# Patient Record
Sex: FEMALE | Race: WHITE | NOT HISPANIC OR LATINO | Employment: OTHER | URBAN - METROPOLITAN AREA
[De-identification: names, ages, dates, MRNs, and addresses within clinical notes are randomized per-mention and may not be internally consistent; named-entity substitution may affect disease eponyms.]

---

## 2020-07-09 DIAGNOSIS — M54.50 LOW BACK PAIN WITHOUT SCIATICA, UNSPECIFIED BACK PAIN LATERALITY, UNSPECIFIED CHRONICITY: Primary | ICD-10-CM

## 2020-07-10 RX ORDER — TRAMADOL HYDROCHLORIDE 50 MG/1
TABLET ORAL
Qty: 120 TABLET | Refills: 1 | Status: SHIPPED | OUTPATIENT
Start: 2020-07-10 | End: 2020-10-23

## 2020-07-18 DIAGNOSIS — I10 ESSENTIAL HYPERTENSION: Primary | ICD-10-CM

## 2020-07-18 RX ORDER — QUINAPRIL 40 MG/1
TABLET ORAL
Qty: 90 TABLET | Refills: 1 | Status: SHIPPED | OUTPATIENT
Start: 2020-07-18 | End: 2021-04-05 | Stop reason: SDUPTHER

## 2020-09-13 DIAGNOSIS — E78.2 MIXED HYPERLIPIDEMIA: Primary | ICD-10-CM

## 2020-09-15 RX ORDER — ATORVASTATIN CALCIUM 20 MG/1
TABLET, FILM COATED ORAL
Qty: 90 TABLET | Refills: 0 | Status: SHIPPED | OUTPATIENT
Start: 2020-09-15 | End: 2020-12-14 | Stop reason: SDUPTHER

## 2020-10-21 DIAGNOSIS — M54.50 LOW BACK PAIN WITHOUT SCIATICA, UNSPECIFIED BACK PAIN LATERALITY, UNSPECIFIED CHRONICITY: ICD-10-CM

## 2020-10-23 RX ORDER — TRAMADOL HYDROCHLORIDE 50 MG/1
TABLET ORAL
Qty: 120 TABLET | Refills: 0 | Status: SHIPPED | OUTPATIENT
Start: 2020-10-23 | End: 2021-01-27

## 2020-11-21 DIAGNOSIS — G25.81 RLS (RESTLESS LEGS SYNDROME): Primary | ICD-10-CM

## 2020-11-21 RX ORDER — PRAMIPEXOLE DIHYDROCHLORIDE 1 MG/1
TABLET ORAL
Qty: 270 TABLET | Refills: 1 | Status: SHIPPED | OUTPATIENT
Start: 2020-11-21 | End: 2021-04-16 | Stop reason: SDUPTHER

## 2020-12-14 DIAGNOSIS — E78.2 MIXED HYPERLIPIDEMIA: ICD-10-CM

## 2020-12-14 RX ORDER — ATORVASTATIN CALCIUM 20 MG/1
20 TABLET, FILM COATED ORAL DAILY
Qty: 90 TABLET | Refills: 0 | Status: SHIPPED | OUTPATIENT
Start: 2020-12-14 | End: 2021-04-05 | Stop reason: SDUPTHER

## 2021-01-26 DIAGNOSIS — M54.50 LOW BACK PAIN WITHOUT SCIATICA, UNSPECIFIED BACK PAIN LATERALITY, UNSPECIFIED CHRONICITY: ICD-10-CM

## 2021-01-27 RX ORDER — TRAMADOL HYDROCHLORIDE 50 MG/1
TABLET ORAL
Qty: 120 TABLET | Refills: 0 | Status: SHIPPED | OUTPATIENT
Start: 2021-01-27 | End: 2021-04-05 | Stop reason: SDUPTHER

## 2021-02-11 DIAGNOSIS — Z23 ENCOUNTER FOR IMMUNIZATION: ICD-10-CM

## 2021-02-12 ENCOUNTER — IMMUNIZATIONS (OUTPATIENT)
Dept: FAMILY MEDICINE CLINIC | Facility: HOSPITAL | Age: 76
End: 2021-02-12

## 2021-02-12 DIAGNOSIS — Z23 ENCOUNTER FOR IMMUNIZATION: Primary | ICD-10-CM

## 2021-02-25 DIAGNOSIS — E78.2 MIXED HYPERLIPIDEMIA: ICD-10-CM

## 2021-02-25 RX ORDER — ATORVASTATIN CALCIUM 20 MG/1
TABLET, FILM COATED ORAL
Qty: 90 TABLET | Refills: 0 | OUTPATIENT
Start: 2021-02-25

## 2021-03-11 ENCOUNTER — IMMUNIZATIONS (OUTPATIENT)
Dept: FAMILY MEDICINE CLINIC | Facility: HOSPITAL | Age: 76
End: 2021-03-11

## 2021-03-11 DIAGNOSIS — Z23 ENCOUNTER FOR IMMUNIZATION: Primary | ICD-10-CM

## 2021-03-11 PROCEDURE — 91301 SARS-COV-2 / COVID-19 MRNA VACCINE (MODERNA) 100 MCG: CPT

## 2021-03-11 PROCEDURE — 0012A SARS-COV-2 / COVID-19 MRNA VACCINE (MODERNA) 100 MCG: CPT

## 2021-03-17 ENCOUNTER — TELEPHONE (OUTPATIENT)
Dept: FAMILY MEDICINE CLINIC | Facility: CLINIC | Age: 76
End: 2021-03-17

## 2021-03-17 DIAGNOSIS — R53.83 OTHER FATIGUE: ICD-10-CM

## 2021-03-17 DIAGNOSIS — Z13.220 SCREENING CHOLESTEROL LEVEL: ICD-10-CM

## 2021-03-17 DIAGNOSIS — Z11.59 ENCOUNTER FOR HEPATITIS C SCREENING TEST FOR LOW RISK PATIENT: Primary | ICD-10-CM

## 2021-03-17 NOTE — TELEPHONE ENCOUNTER
Pt  called and would like to know if you can order lab work for her before her next visit?    I will mail to her per her request

## 2021-04-05 DIAGNOSIS — I10 ESSENTIAL HYPERTENSION: ICD-10-CM

## 2021-04-05 DIAGNOSIS — E78.2 MIXED HYPERLIPIDEMIA: ICD-10-CM

## 2021-04-05 DIAGNOSIS — M54.50 LOW BACK PAIN WITHOUT SCIATICA, UNSPECIFIED BACK PAIN LATERALITY, UNSPECIFIED CHRONICITY: ICD-10-CM

## 2021-04-06 DIAGNOSIS — I10 ESSENTIAL HYPERTENSION: ICD-10-CM

## 2021-04-06 DIAGNOSIS — E78.2 MIXED HYPERLIPIDEMIA: ICD-10-CM

## 2021-04-06 RX ORDER — ATORVASTATIN CALCIUM 20 MG/1
20 TABLET, FILM COATED ORAL DAILY
Qty: 30 TABLET | Refills: 0 | Status: SHIPPED | OUTPATIENT
Start: 2021-04-06 | End: 2021-04-06

## 2021-04-06 RX ORDER — QUINAPRIL 40 MG/1
40 TABLET ORAL DAILY
Qty: 30 TABLET | Refills: 0 | Status: SHIPPED | OUTPATIENT
Start: 2021-04-06 | End: 2021-04-06

## 2021-04-06 RX ORDER — TRAMADOL HYDROCHLORIDE 50 MG/1
50 TABLET ORAL EVERY 6 HOURS
Qty: 120 TABLET | Refills: 0 | Status: SHIPPED | OUTPATIENT
Start: 2021-04-06 | End: 2021-04-16 | Stop reason: SDUPTHER

## 2021-04-06 RX ORDER — ATORVASTATIN CALCIUM 20 MG/1
TABLET, FILM COATED ORAL
Qty: 90 TABLET | Refills: 0 | Status: SHIPPED | OUTPATIENT
Start: 2021-04-06 | End: 2021-04-16 | Stop reason: SDUPTHER

## 2021-04-06 RX ORDER — QUINAPRIL 40 MG/1
TABLET ORAL
Qty: 90 TABLET | Refills: 0 | Status: SHIPPED | OUTPATIENT
Start: 2021-04-06 | End: 2021-04-16 | Stop reason: SDUPTHER

## 2021-04-15 PROBLEM — G25.81 RESTLESS LEGS SYNDROME (RLS): Status: ACTIVE | Noted: 2021-04-15

## 2021-04-16 ENCOUNTER — OFFICE VISIT (OUTPATIENT)
Dept: FAMILY MEDICINE CLINIC | Facility: CLINIC | Age: 76
End: 2021-04-16
Payer: MEDICARE

## 2021-04-16 VITALS
BODY MASS INDEX: 26.66 KG/M2 | HEART RATE: 85 BPM | RESPIRATION RATE: 22 BRPM | TEMPERATURE: 98.4 F | SYSTOLIC BLOOD PRESSURE: 122 MMHG | DIASTOLIC BLOOD PRESSURE: 62 MMHG | HEIGHT: 65 IN | OXYGEN SATURATION: 95 % | WEIGHT: 160 LBS

## 2021-04-16 DIAGNOSIS — F32.1 CURRENT MODERATE EPISODE OF MAJOR DEPRESSIVE DISORDER WITHOUT PRIOR EPISODE (HCC): ICD-10-CM

## 2021-04-16 DIAGNOSIS — Z23 ENCOUNTER FOR IMMUNIZATION: ICD-10-CM

## 2021-04-16 DIAGNOSIS — I10 ESSENTIAL HYPERTENSION: ICD-10-CM

## 2021-04-16 DIAGNOSIS — R93.89 ABNORMAL CXR: ICD-10-CM

## 2021-04-16 DIAGNOSIS — E78.2 MIXED HYPERLIPIDEMIA: ICD-10-CM

## 2021-04-16 DIAGNOSIS — G25.81 RESTLESS LEGS SYNDROME (RLS): Primary | ICD-10-CM

## 2021-04-16 DIAGNOSIS — M54.50 LOW BACK PAIN WITHOUT SCIATICA, UNSPECIFIED BACK PAIN LATERALITY, UNSPECIFIED CHRONICITY: ICD-10-CM

## 2021-04-16 DIAGNOSIS — G25.81 RLS (RESTLESS LEGS SYNDROME): ICD-10-CM

## 2021-04-16 DIAGNOSIS — Z11.59 NEED FOR HEPATITIS C SCREENING TEST: ICD-10-CM

## 2021-04-16 DIAGNOSIS — Z00.00 WELL ADULT EXAM: ICD-10-CM

## 2021-04-16 DIAGNOSIS — Z12.31 SCREENING MAMMOGRAM FOR HIGH-RISK PATIENT: ICD-10-CM

## 2021-04-16 DIAGNOSIS — Z12.12 SCREENING FOR COLORECTAL CANCER: ICD-10-CM

## 2021-04-16 DIAGNOSIS — M81.0 AGE-RELATED OSTEOPOROSIS WITHOUT CURRENT PATHOLOGICAL FRACTURE: ICD-10-CM

## 2021-04-16 DIAGNOSIS — Z12.11 SCREENING FOR COLORECTAL CANCER: ICD-10-CM

## 2021-04-16 PROCEDURE — 1123F ACP DISCUSS/DSCN MKR DOCD: CPT | Performed by: FAMILY MEDICINE

## 2021-04-16 PROCEDURE — 99214 OFFICE O/P EST MOD 30 MIN: CPT | Performed by: FAMILY MEDICINE

## 2021-04-16 PROCEDURE — 96372 THER/PROPH/DIAG INJ SC/IM: CPT | Performed by: FAMILY MEDICINE

## 2021-04-16 PROCEDURE — G0439 PPPS, SUBSEQ VISIT: HCPCS | Performed by: FAMILY MEDICINE

## 2021-04-16 RX ORDER — ALBUTEROL SULFATE 90 UG/1
AEROSOL, METERED RESPIRATORY (INHALATION)
COMMUNITY
Start: 2021-04-07 | End: 2022-03-31

## 2021-04-16 RX ORDER — DOXYCYCLINE HYCLATE 100 MG/1
100 CAPSULE ORAL EVERY 12 HOURS
COMMUNITY
Start: 2021-04-07 | End: 2022-03-15 | Stop reason: ALTCHOICE

## 2021-04-16 RX ORDER — ESCITALOPRAM OXALATE 10 MG/1
10 TABLET ORAL DAILY
Qty: 30 TABLET | Refills: 2 | Status: SHIPPED | OUTPATIENT
Start: 2021-04-16 | End: 2022-03-15 | Stop reason: ALTCHOICE

## 2021-04-16 RX ORDER — QUINAPRIL 40 MG/1
40 TABLET ORAL DAILY
Qty: 90 TABLET | Refills: 1 | Status: SHIPPED | OUTPATIENT
Start: 2021-04-16 | End: 2022-02-07

## 2021-04-16 RX ORDER — TRAMADOL HYDROCHLORIDE 50 MG/1
50 TABLET ORAL EVERY 6 HOURS
Qty: 120 TABLET | Refills: 0 | Status: SHIPPED | OUTPATIENT
Start: 2021-04-16 | End: 2021-09-13

## 2021-04-16 RX ORDER — PRAMIPEXOLE DIHYDROCHLORIDE 1 MG/1
1 TABLET ORAL 2 TIMES DAILY
Qty: 180 TABLET | Refills: 1 | Status: SHIPPED | OUTPATIENT
Start: 2021-04-16 | End: 2022-03-15 | Stop reason: ALTCHOICE

## 2021-04-16 RX ORDER — METHYLPREDNISOLONE 4 MG/1
TABLET ORAL
COMMUNITY
Start: 2021-04-07 | End: 2022-03-15 | Stop reason: ALTCHOICE

## 2021-04-16 RX ORDER — ATORVASTATIN CALCIUM 20 MG/1
20 TABLET, FILM COATED ORAL DAILY
Qty: 90 TABLET | Refills: 1 | Status: SHIPPED | OUTPATIENT
Start: 2021-04-16 | End: 2021-06-05

## 2021-04-16 NOTE — PROGRESS NOTES
Assessment and Plan:     Problem List Items Addressed This Visit        Cardiovascular and Mediastinum    Hypertension     Patient is stable with current anti-hypertensive medicine and continue to follow a low sodium diet and take current medication            Other    Restless legs syndrome (RLS) - Primary     Patient is stable  and will continue present plan of care and reassess at next routine visit         Hyperlipidemia     Patient  is stable with current medication and we discussed a low fat low cholesterol diet  Weight loss also discussed for this will help lower cholesterol also  Recheck lipids in 6 months  Other Visit Diagnoses     Need for hepatitis C screening test        Encounter for immunization        Screening for colorectal cancer        Well adult exam               Preventive health issues were discussed with patient, and age appropriate screening tests were ordered as noted in patient's After Visit Summary  Personalized health advice and appropriate referrals for health education or preventive services given if needed, as noted in patient's After Visit Summary       History of Present Illness:     Patient presents for Medicare Annual Wellness visit    Patient Care Team:  Sarita Moser MD as PCP - General (Family Medicine)     Problem List:     Patient Active Problem List   Diagnosis    Hypertension    Restless legs syndrome (RLS)    Hyperlipidemia      Past Medical and Surgical History:     Past Medical History:   Diagnosis Date    Hyperlipidemia     Hypertension      Past Surgical History:   Procedure Laterality Date    AUGMENTATION BREAST  1976    CATARACT EXTRACTION, BILATERAL      COLONOSCOPY  09/2015    DILATION AND CURETTAGE OF UTERUS      HERNIA REPAIR  2013    NEVUS EXCISION      SALPINGECTOMY      for endometrosis      Family History:     Family History   Problem Relation Age of Onset    Heart disease Family     Hypertension Family     Colon cancer Family Social History:        Social History     Socioeconomic History    Marital status: Single     Spouse name: Not on file    Number of children: Not on file    Years of education: Not on file    Highest education level: Not on file   Occupational History    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   Social Needs    Financial resource strain: Not on file    Food insecurity     Worry: Not on file     Inability: Not on file    Transportation needs     Medical: Not on file     Non-medical: Not on file   Tobacco Use    Smoking status: Never Smoker    Smokeless tobacco: Never Used   Substance and Sexual Activity    Alcohol use: Not Currently    Drug use: Not on file     Comment: No use    Sexual activity: Not on file   Lifestyle    Physical activity     Days per week: Not on file     Minutes per session: Not on file    Stress: Not on file   Relationships    Social connections     Talks on phone: Not on file     Gets together: Not on file     Attends [employer] service: Not on file     Active member of club or organization: Not on file     Attends meetings of clubs or organizations: Not on file     Relationship status: Not on file    Intimate partner violence     Fear of current or ex partner: Not on file     Emotionally abused: Not on file     Physically abused: Not on file     Forced sexual activity: Not on file   Other Topics Concern    Not on file   Social History Narrative    · Most recent tobacco use screenin2019      · Do you currently or have you served in the [email or web address]:   No      · Were you activated, into active duty, as a member of the "[employer] or as a Reservist:   No      · 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      · Exercise level: Moderate      · Diet:   Regular      · General stress level:   High      · Alcohol intake:   None      · Caffeine intake: Moderate      · Seat belts used routinely:   Yes      · Sunscreen used routinely:   Yes      · Smoke alarm in home:    Yes · Advance directive:   No      · Has the Patient had a mammogram to screen for breast cancer within 24 months:   Yes      · Live alone or with others:   alone      · Are you currently employed:   Yes       Medications and Allergies:     Current Outpatient Medications   Medication Sig Dispense Refill    atorvastatin (LIPITOR) 20 mg tablet TAKE 1 TABLET(20 MG) BY MOUTH DAILY 90 tablet 0    pramipexole (MIRAPEX) 1 mg tablet TAKE 1 TABLET BY MOUTH THREE TIMES DAILY 270 tablet 1    quinapril (ACCUPRIL) 40 MG tablet TAKE 1 TABLET(40 MG) BY MOUTH DAILY 90 tablet 0    traMADol (ULTRAM) 50 mg tablet Take 1 tablet (50 mg total) by mouth every 6 (six) hours 120 tablet 0     No current facility-administered medications for this visit  Not on File   Immunizations:     Immunization History   Administered Date(s) Administered    H1N1, All Formulations 12/04/2009    INFLUENZA 09/16/2015, 11/04/2016, 10/21/2017, 11/05/2020    Influenza, seasonal, injectable 10/11/2007, 11/04/2008, 09/01/2009    Pneumococcal Conjugate 13-Valent 10/05/2016    SARS-CoV-2 / COVID-19 mRNA IM (Alex Coil) 02/12/2021, 03/11/2021    Zoster 05/01/2017      Health Maintenance:         Topic Date Due    Hepatitis C Screening  Never done    MAMMOGRAM  Never done    Colorectal Cancer Screening  Never done         Topic Date Due    DTaP,Tdap,and Td Vaccines (1 - Tdap) Never done    Pneumococcal Vaccine: 65+ Years (2 of 2 - PPSV23) 10/05/2017      Medicare Health Risk Assessment:     Ht 5' 5" (1 651 m)   Wt 72 6 kg (160 lb)   BMI 26 63 kg/m²      Sandhya Ireland is here for her Subsequent Wellness visit  Health Risk Assessment:   Patient rates overall health as good  Patient feels that their physical health rating is slightly worse  Patient is satisfied with their life  Eyesight was rated as same  Hearing was rated as same  Patient feels that their emotional and mental health rating is slightly worse  Patients states they are never, rarely angry  Patient states they are often unusually tired/fatigued  Pain experienced in the last 7 days has been some  Patient's pain rating has been 4/10  Patient states that she has experienced weight loss or gain in last 6 months  Depression Screening:   PHQ-2 Score: 1      Fall Risk Screening: In the past year, patient has experienced: history of falling in past year    Number of falls: 1  Injured during fall?: No    Feels unsteady when standing or walking?: No    Worried about falling?: No      Urinary Incontinence Screening:   Patient has leaked urine accidently in the last six months  Home Safety:  Patient does not have trouble with stairs inside or outside of their home  Patient has working smoke alarms and has working carbon monoxide detector  Home safety hazards include: none  Nutrition:   Current diet is Regular  Medications:   Patient is currently taking over-the-counter supplements  OTC medications include: see medication list  Patient is able to manage medications  Activities of Daily Living (ADLs)/Instrumental Activities of Daily Living (IADLs):   Walk and transfer into and out of bed and chair?: Yes  Dress and groom yourself?: Yes    Bathe or shower yourself?: Yes    Feed yourself? Yes  Do your laundry/housekeeping?: Yes  Manage your money, pay your bills and track your expenses?: Yes  Make your own meals?: Yes    Do your own shopping?: Yes    Previous Hospitalizations:   Any hospitalizations or ED visits within the last 12 months?: No      Advance Care Planning:   Living will: Yes    Durable POA for healthcare:  Yes    Advanced directive: Yes      Cognitive Screening:   Provider or family/friend/caregiver concerned regarding cognition?: No    PREVENTIVE SCREENINGS      Cardiovascular Screening:    General: Screening Not Indicated and History Lipid Disorder      Cervical Cancer Screening:    General: Screening Not Indicated      Lung Cancer Screening:     General: Screening Not Indicated    Screening, Brief Intervention, and Referral to Treatment (SBIRT)    Screening  Typical number of drinks in a day: 0  Typical number of drinks in a week: 0  Interpretation: Low risk drinking behavior      Single Item Drug Screening:  How often have you used an illegal drug (including marijuana) or a prescription medication for non-medical reasons in the past year? never    Single Item Drug Screen Score: 0  Interpretation: Negative screen for possible drug use disorder      Uma Leslie MD

## 2021-04-16 NOTE — PATIENT INSTRUCTIONS
Medicare Preventive Visit Patient Instructions  Thank you for completing your Welcome to Medicare Visit or Medicare Annual Wellness Visit today  Your next wellness visit will be due in one year (4/17/2022)  The screening/preventive services that you may require over the next 5-10 years are detailed below  Some tests may not apply to you based off risk factors and/or age  Screening tests ordered at today's visit but not completed yet may show as past due  Also, please note that scanned in results may not display below  Preventive Screenings:  Service Recommendations Previous Testing/Comments   Colorectal Cancer Screening  * Colonoscopy    * Fecal Occult Blood Test (FOBT)/Fecal Immunochemical Test (FIT)  * Fecal DNA/Cologuard Test  * Flexible Sigmoidoscopy Age: 54-65 years old   Colonoscopy: every 10 years (may be performed more frequently if at higher risk)  OR  FOBT/FIT: every 1 year  OR  Cologuard: every 3 years  OR  Sigmoidoscopy: every 5 years  Screening may be recommended earlier than age 48 if at higher risk for colorectal cancer  Also, an individualized decision between you and your healthcare provider will decide whether screening between the ages of 74-80 would be appropriate  Colonoscopy: Not on file  FOBT/FIT: Not on file  Cologuard: Not on file  Sigmoidoscopy: Not on file          Breast Cancer Screening Age: 36 years old  Frequency: every 1-2 years  Not required if history of left and right mastectomy Mammogram: Not on file        Cervical Cancer Screening Between the ages of 21-29, pap smear recommended once every 3 years  Between the ages of 33-67, can perform pap smear with HPV co-testing every 5 years     Recommendations may differ for women with a history of total hysterectomy, cervical cancer, or abnormal pap smears in past  Pap Smear: Not on file    Screening Not Indicated   Hepatitis C Screening Once for adults born between [facility]  More frequently in patients at high risk for Hepatitis C Hep C Antibody: Not on file        Diabetes Screening 1-2 times per year if you're at risk for diabetes or have pre-diabetes Fasting glucose: No results in last 5 years   A1C: No results in last 5 years        Cholesterol Screening Once every 5 years if you don't have a lipid disorder  May order more often based on risk factors  Lipid panel: Not on file    Screening Not Indicated  History Lipid Disorder     Other Preventive Screenings Covered by Medicare:  1  Abdominal Aortic Aneurysm (AAA) Screening: covered once if your at risk  You're considered to be at risk if you have a family history of AAA  2  Lung Cancer Screening: covers low dose CT scan once per year if you meet all of the following conditions: (1) Age 50-69; (2) No signs or symptoms of lung cancer; (3) Current smoker or have quit smoking within the last 15 years; (4) You have a tobacco smoking history of at least 30 pack years (packs per day multiplied by number of years you smoked); (5) You get a written order from a healthcare provider  3  Glaucoma Screening: covered annually if you're considered high risk: (1) You have diabetes OR (2) Family history of glaucoma OR (3)  aged 48 and older OR (3)  American aged 72 and older  3  Osteoporosis Screening: covered every 2 years if you meet one of the following conditions: (1) You're estrogen deficient and at risk for osteoporosis based off medical history and other findings; (2) Have a vertebral abnormality; (3) On glucocorticoid therapy for more than 3 months; (4) Have primary hyperparathyroidism; (5) On osteoporosis medications and need to assess response to drug therapy  · Last bone density test (DXA Scan): Not on file  5  HIV Screening: covered annually if you're between the age of 12-76  Also covered annually if you are younger than 13 and older than 72 with risk factors for HIV infection   For pregnant patients, it is covered up to 3 times per pregnancy  Immunizations:  Immunization Recommendations   Influenza Vaccine Annual influenza vaccination during flu season is recommended for all persons aged >= 6 months who do not have contraindications   Pneumococcal Vaccine (Prevnar and Pneumovax)  * Prevnar = PCV13  * Pneumovax = PPSV23   Adults 25-60 years old: 1-3 doses may be recommended based on certain risk factors  Adults 72 years old: Prevnar (PCV13) vaccine recommended followed by Pneumovax (PPSV23) vaccine  If already received PPSV23 since turning 65, then PCV13 recommended at least one year after PPSV23 dose  Hepatitis B Vaccine 3 dose series if at intermediate or high risk (ex: diabetes, end stage renal disease, liver disease)   Tetanus (Td) Vaccine - COST NOT COVERED BY MEDICARE PART B Following completion of primary series, a booster dose should be given every 10 years to maintain immunity against tetanus  Td may also be given as tetanus wound prophylaxis  Tdap Vaccine - COST NOT COVERED BY MEDICARE PART B Recommended at least once for all adults  For pregnant patients, recommended with each pregnancy  Shingles Vaccine (Shingrix) - COST NOT COVERED BY MEDICARE PART B  2 shot series recommended in those aged 48 and above     Health Maintenance Due:      Topic Date Due    Hepatitis C Screening  Never done    MAMMOGRAM  Never done    Colorectal Cancer Screening  Never done     Immunizations Due:      Topic Date Due    DTaP,Tdap,and Td Vaccines (1 - Tdap) Never done    Pneumococcal Vaccine: 65+ Years (2 of 2 - PPSV23) 10/05/2017     Advance Directives   What are advance directives? Advance directives are legal documents that state your wishes and plans for medical care  These plans are made ahead of time in case you lose your ability to make decisions for yourself  Advance directives can apply to any medical decision, such as the treatments you want, and if you want to donate organs  What are the types of advance directives?   There are many types of advance directives, and each state has rules about how to use them  You may choose a combination of any of the following:  · Living will: This is a written record of the treatment you want  You can also choose which treatments you do not want, which to limit, and which to stop at a certain time  This includes surgery, medicine, IV fluid, and tube feedings  · Durable power of  for healthcare [facility]): This is a written record that states who you want to make healthcare choices for you when you are unable to make them for yourself  This person, called a proxy, is usually a family member or a friend  You may choose more than 1 proxy  · Do not resuscitate (DNR) order:  A DNR order is used in case your heart stops beating or you stop breathing  It is a request not to have certain forms of treatment, such as CPR  A DNR order may be included in other types of advance directives  · Medical directive: This covers the care that you want if you are in a coma, near death, or unable to make decisions for yourself  You can list the treatments you want for each condition  Treatment may include pain medicine, surgery, blood transfusions, dialysis, IV or tube feedings, and a ventilator (breathing machine)  · Values history: This document has questions about your views, beliefs, and how you feel and think about life  This information can help others choose the care that you would choose  Why are advance directives important? An advance directive helps you control your care  Although spoken wishes may be used, it is better to have your wishes written down  Spoken wishes can be misunderstood, or not followed  Treatments may be given even if you do not want them  An advance directive may make it easier for your family to make difficult choices about your care  Fall Prevention    Fall prevention  includes ways to make your home and other areas safer   It also includes ways you can move more carefully to prevent a fall  Health conditions that cause changes in your blood pressure, vision, or muscle strength and coordination may increase your risk for falls  Medicines may also increase your risk for falls if they make you dizzy, weak, or sleepy  Fall prevention tips:   · Stand or sit up slowly  · Use assistive devices as directed  · Wear shoes that fit well and have soles that   · Wear a personal alarm  · Stay active  · Manage your medical conditions  Home Safety Tips:  · Add items to prevent falls in the bathroom  · Keep paths clear  · Install bright lights in your home  · Keep items you use often on shelves within reach  · Paint or place reflective tape on the edges of your stairs  Urinary Incontinence   Urinary incontinence (UI)  is when you lose control of your bladder  UI develops because your bladder cannot store or empty urine properly  The 3 most common types of UI are stress incontinence, urge incontinence, or both  Medicines:   · May be given to help strengthen your bladder control  Report any side effects of medication to your healthcare provider  Do pelvic muscle exercises often:  Your pelvic muscles help you stop urinating  Squeeze these muscles tight for 5 seconds, then relax for 5 seconds  Gradually work up to squeezing for 10 seconds  Do 3 sets of 15 repetitions a day, or as directed  This will help strengthen your pelvic muscles and improve bladder control  Train your bladder:  Go to the bathroom at set times, such as every 2 hours, even if you do not feel the urge to go  You can also try to hold your urine when you feel the urge to go  For example, hold your urine for 5 minutes when you feel the urge to go  As that becomes easier, hold your urine for 10 minutes  Self-care:   · Keep a UI record  Write down how often you leak urine and how much you leak  Make a note of what you were doing when you leaked urine  · Drink liquids as directed   You may need to limit the amount of liquid you drink to help control your urine leakage  Do not drink any liquid right before you go to bed  Limit or do not have drinks that contain caffeine or alcohol  · Prevent constipation  Eat a variety of high-fiber foods  Good examples are high-fiber cereals, beans, vegetables, and whole-grain breads  Walking is the best way to trigger your intestines to have a bowel movement  · Exercise regularly and maintain a healthy weight  Weight loss and exercise will decrease pressure on your bladder and help you control your leakage  · Use a catheter as directed  to help empty your bladder  A catheter is a tiny, plastic tube that is put into your bladder to drain your urine  · Go to behavior therapy as directed  Behavior therapy may be used to help you learn to control your urge to urinate  Weight Management   Why it is important to manage your weight:  Being overweight increases your risk of health conditions such as heart disease, high blood pressure, type 2 diabetes, and certain types of cancer  It can also increase your risk for osteoarthritis, sleep apnea, and other respiratory problems  Aim for a slow, steady weight loss  Even a small amount of weight loss can lower your risk of health problems  How to lose weight safely:  A safe and healthy way to lose weight is to eat fewer calories and get regular exercise  You can lose up about 1 pound a week by decreasing the number of calories you eat by 500 calories each day  Healthy meal plan for weight management:  A healthy meal plan includes a variety of foods, contains fewer calories, and helps you stay healthy  A healthy meal plan includes the following:  · Eat whole-grain foods more often  A healthy meal plan should contain fiber  Fiber is the part of grains, fruits, and vegetables that is not broken down by your body  Whole-grain foods are healthy and provide extra fiber in your diet   Some examples of whole-grain foods are whole-wheat breads and pastas, oatmeal, brown rice, and bulgur  · Eat a variety of vegetables every day  Include dark, leafy greens such as spinach, kale, michael greens, and mustard greens  Eat yellow and orange vegetables such as carrots, sweet potatoes, and winter squash  · Eat a variety of fruits every day  Choose fresh or canned fruit (canned in its own juice or light syrup) instead of juice  Fruit juice has very little or no fiber  · Eat low-fat dairy foods  Drink fat-free (skim) milk or 1% milk  Eat fat-free yogurt and low-fat cottage cheese  Try low-fat cheeses such as mozzarella and other reduced-fat cheeses  · Choose meat and other protein foods that are low in fat  Choose beans or other legumes such as split peas or lentils  Choose fish, skinless poultry (chicken or turkey), or lean cuts of red meat (beef or pork)  Before you cook meat or poultry, cut off any visible fat  · Use less fat and oil  Try baking foods instead of frying them  Add less fat, such as margarine, sour cream, regular salad dressing and mayonnaise to foods  Eat fewer high-fat foods  Some examples of high-fat foods include french fries, doughnuts, ice cream, and cakes  · Eat fewer sweets  Limit foods and drinks that are high in sugar  This includes candy, cookies, regular soda, and sweetened drinks  Exercise:  Exercise at least 30 minutes per day on most days of the week  Some examples of exercise include walking, biking, dancing, and swimming  You can also fit in more physical activity by taking the stairs instead of the elevator or parking farther away from stores  Ask your healthcare provider about the best exercise plan for you  © Copyright [employer] 2018 Information is for End User's use only and may not be sold, redistributed or otherwise used for commercial purposes   All illustrations and images included in CareNotes® are the copyrighted property of [employer]  or [address] [place] [address] [facility]

## 2021-04-16 NOTE — PROGRESS NOTES
BMI Counseling: Body mass index is 26 63 kg/m²  The BMI is above normal  Nutrition recommendations include decreasing portion sizes, encouraging healthy choices of fruits and vegetables, decreasing fast food intake, consuming healthier snacks, limiting drinks that contain sugar, moderation in carbohydrate intake, increasing intake of lean protein, reducing intake of saturated and trans fat and reducing intake of cholesterol  Exercise recommendations include exercising 3-5 times per week  No pharmacotherapy was ordered  Patient referred to PCP due to patient being overweight  Assessment/Plan:         Problem List Items Addressed This Visit        Cardiovascular and Mediastinum    Hypertension     Patient is stable with current anti-hypertensive medicine and continue to follow a low sodium diet and take current medication         Relevant Medications    quinapril (ACCUPRIL) 40 MG tablet       Other    Restless legs syndrome (RLS) - Primary     Patient is stable  and will continue present plan of care and reassess at next routine visit         Hyperlipidemia     Patient  is stable with current medication and we discussed a low fat low cholesterol diet  Weight loss also discussed for this will help lower cholesterol also  Recheck lipids in 6 months  Relevant Medications    atorvastatin (LIPITOR) 20 mg tablet    Current moderate episode of major depressive disorder without prior episode (HCC)     Pt is going to testify at a murder trial and has  Stress and will start Lexapro           Relevant Medications    escitalopram (LEXAPRO) 10 mg tablet      Other Visit Diagnoses     Need for hepatitis C screening test        Relevant Orders    Hepatitis C Antibody ([facility])    Encounter for immunization        Screening for colorectal cancer        Well adult exam        Screening mammogram for high-risk patient        Relevant Orders    Mammo screening bilateral w 3d & cad    Abnormal CXR        Relevant Orders    CT chest wo contrast    RLS (restless legs syndrome)        Relevant Medications    pramipexole (MIRAPEX) 1 mg tablet    Low back pain without sciatica, unspecified back pain laterality, unspecified chronicity        Relevant Medications    traMADol (ULTRAM) 50 mg tablet    Age-related osteoporosis without current pathological fracture        Relevant Medications    denosumab (PROLIA) subcutaneous injection 60 mg (Start on 4/16/2021 10:45 AM)            Subjective:      Patient ID: Kyle Luong is a 70 y o  female  This is a 79-year-old white female her today for her Medicare wellness as well as a checkup on her blood pressure hyperlipidemia and restless leg syndrome  Patient recently was a seen at the ambulatory care center and was found to have a pneumonia  Patient had a chest x-ray which showed  pneumonia and she was treated  She also found on chest x-ray a lesion in the right lower lobe not related to pneumonia which we will see about doing a CT scan to further evaluate that  Given I given patient will need refills on her medications today      The following portions of the patient's history were reviewed and updated as appropriate:   Past Medical History:  She has a past medical history of Current moderate episode of major depressive disorder without prior episode ([facility] [address] ) (4/16/2021), Hyperlipidemia, and Hypertension  ,  _______________________________________________________________________  Medical Problems:  does not have any pertinent problems on file ,  _______________________________________________________________________  Past Surgical History:   has a past surgical history that includes Dilation and curettage of uterus; Nevus excision; Cataract extraction, bilateral; Colonoscopy (09/2015); Hernia repair (2013); Salpingectomy; and AUGMENTATION BREAST (1976)  ,  _______________________________________________________________________  Family History:  family history includes Colon cancer in her family; Heart disease in her family; Hypertension in her family  ,  _______________________________________________________________________  Social History:   reports that she has never smoked  She has never used smokeless tobacco  She reports previous alcohol use  No history on file for drug ,  _______________________________________________________________________  Allergies:  is allergic to sertraline     _______________________________________________________________________  Current Outpatient Medications   Medication Sig Dispense Refill    albuterol (PROVENTIL HFA,VENTOLIN HFA) 90 mcg/act inhaler INHALE 2 INHALATIONS BY MOUTH EVERY 4 TO 6 HOURS AS NEEDED FOR BREATHING      atorvastatin (LIPITOR) 20 mg tablet Take 1 tablet (20 mg total) by mouth daily 90 tablet 1    doxycycline hyclate (VIBRAMYCIN) 100 mg capsule Take 100 mg by mouth every 12 (twelve) hours      pramipexole (MIRAPEX) 1 mg tablet Take 1 tablet (1 mg total) by mouth 2 (two) times a day 180 tablet 1    quinapril (ACCUPRIL) 40 MG tablet Take 1 tablet (40 mg total) by mouth daily 90 tablet 1    traMADol (ULTRAM) 50 mg tablet Take 1 tablet (50 mg total) by mouth every 6 (six) hours 120 tablet 0    escitalopram (LEXAPRO) 10 mg tablet Take 1 tablet (10 mg total) by mouth daily 30 tablet 2    methylPREDNISolone 4 MG tablet therapy pack Follow package directions       Current Facility-Administered Medications   Medication Dose Route Frequency Provider Last Rate Last Admin    denosumab (PROLIA) subcutaneous injection 60 mg  60 mg Subcutaneous Once Sreekanth Haney MD         _______________________________________________________________________  Review of Systems   Constitutional: Negative for activity change, appetite change, fatigue and fever  HENT: Negative for congestion, ear pain, postnasal drip, rhinorrhea, sinus pressure, sinus pain, sneezing and sore throat  Eyes: Negative for pain and redness     Respiratory: Negative for apnea, cough, chest tightness, shortness of breath and wheezing  Cardiovascular: Negative for chest pain, palpitations and leg swelling  Gastrointestinal: Negative for abdominal pain, constipation, diarrhea, nausea and vomiting  Endocrine: Negative for cold intolerance and heat intolerance  Genitourinary: Negative for difficulty urinating, dysuria, frequency, hematuria and urgency  Musculoskeletal: Negative for arthralgias, back pain, gait problem and myalgias  Skin: Negative for rash  Neurological: Negative for dizziness, speech difficulty, weakness, numbness and headaches  Hematological: Does not bruise/bleed easily  Psychiatric/Behavioral: Negative for agitation, confusion and hallucinations  Objective:  Vitals:    04/16/21 0957   BP: 122/62   BP Location: Left arm   Patient Position: Sitting   Cuff Size: Standard   Pulse: 85   Resp: 22   Temp: 98 4 °F (36 9 °C)   TempSrc: Temporal   SpO2: 95%   Weight: 72 6 kg (160 lb)   Height: 5' 5" (1 651 m)     Body mass index is 26 63 kg/m²  Physical Exam  Vitals signs and nursing note reviewed  Constitutional:       Appearance: She is well-developed  HENT:      Head: Normocephalic and atraumatic  Nose: Nose normal       Mouth/Throat:      Mouth: Mucous membranes are moist    Eyes:      General: No scleral icterus  Conjunctiva/sclera: Conjunctivae normal       Pupils: Pupils are equal, round, and reactive to light  Neck:      Musculoskeletal: Normal range of motion and neck supple  Thyroid: No thyromegaly  Cardiovascular:      Rate and Rhythm: Normal rate and regular rhythm  Pulmonary:      Effort: Pulmonary effort is normal       Breath sounds: Normal breath sounds  No wheezing  Abdominal:      General: Bowel sounds are normal  There is no distension  Palpations: Abdomen is soft  Tenderness: There is no abdominal tenderness  There is no guarding or rebound  Musculoskeletal: Normal range of motion  General: No tenderness or deformity  Skin:     General: Skin is warm and dry  Findings: No erythema or rash  Neurological:      Mental Status: She is alert and oriented to person, place, and time  Sensory: No sensory deficit  Psychiatric:         Behavior: Behavior normal          Thought Content:  Thought content normal          Judgment: Judgment normal

## 2021-04-16 NOTE — ASSESSMENT & PLAN NOTE
Patient  is stable with current medication and we discussed a low fat low cholesterol diet  Weight loss also discussed for this will help lower cholesterol also  Recheck lipids in 6 months

## 2021-04-16 NOTE — ASSESSMENT & PLAN NOTE
Patient is stable with current anti-hypertensive medicine and continue to follow a low sodium diet and take current medication

## 2021-04-23 ENCOUNTER — TELEPHONE (OUTPATIENT)
Dept: FAMILY MEDICINE CLINIC | Facility: CLINIC | Age: 76
End: 2021-04-23

## 2021-04-23 NOTE — TELEPHONE ENCOUNTER
Pharmacy called with a Drug interaction between lexapro and tramodol  Also she has an allergy to zoloft  I told them Im sure you are aware but I was leaving you a note

## 2021-05-17 ENCOUNTER — HOSPITAL ENCOUNTER (OUTPATIENT)
Dept: CT IMAGING | Facility: HOSPITAL | Age: 76
Discharge: HOME | End: 2021-05-17
Attending: FAMILY MEDICINE
Payer: MEDICARE

## 2021-05-17 DIAGNOSIS — R93.89 ABNORMAL CXR: ICD-10-CM

## 2021-05-17 PROCEDURE — G1004 CDSM NDSC: HCPCS

## 2021-05-17 PROCEDURE — 71250 CT THORAX DX C-: CPT

## 2021-05-26 PROBLEM — R93.89 ABNORMAL CXR: Status: ACTIVE | Noted: 2021-05-26

## 2021-06-05 DIAGNOSIS — E78.2 MIXED HYPERLIPIDEMIA: ICD-10-CM

## 2021-06-05 RX ORDER — ATORVASTATIN CALCIUM 20 MG/1
TABLET, FILM COATED ORAL
Qty: 90 TABLET | Refills: 1 | Status: SHIPPED | OUTPATIENT
Start: 2021-06-05 | End: 2021-09-13

## 2021-08-23 ENCOUNTER — HOSPITAL ENCOUNTER (OUTPATIENT)
Dept: NON INVASIVE DIAGNOSTICS | Facility: CLINIC | Age: 76
Discharge: HOME | End: 2021-08-23
Payer: MEDICARE

## 2021-08-23 ENCOUNTER — HOSPITAL ENCOUNTER (OUTPATIENT)
Dept: RADIOLOGY | Facility: HOSPITAL | Age: 76
Discharge: HOME | End: 2021-08-23
Payer: MEDICARE

## 2021-08-23 ENCOUNTER — OFFICE VISIT (OUTPATIENT)
Dept: FAMILY MEDICINE CLINIC | Facility: CLINIC | Age: 76
End: 2021-08-23
Payer: MEDICARE

## 2021-08-23 VITALS
HEIGHT: 65 IN | DIASTOLIC BLOOD PRESSURE: 80 MMHG | RESPIRATION RATE: 16 BRPM | TEMPERATURE: 98.6 F | WEIGHT: 165 LBS | BODY MASS INDEX: 27.49 KG/M2 | HEART RATE: 85 BPM | SYSTOLIC BLOOD PRESSURE: 136 MMHG | OXYGEN SATURATION: 96 %

## 2021-08-23 DIAGNOSIS — R06.00 DYSPNEA ON EXERTION: ICD-10-CM

## 2021-08-23 DIAGNOSIS — M79.604 ACUTE LEG PAIN, RIGHT: ICD-10-CM

## 2021-08-23 DIAGNOSIS — M79.605 ACUTE LEG PAIN, LEFT: Primary | ICD-10-CM

## 2021-08-23 DIAGNOSIS — M79.605 ACUTE LEG PAIN, LEFT: ICD-10-CM

## 2021-08-23 PROCEDURE — 93970 EXTREMITY STUDY: CPT

## 2021-08-23 PROCEDURE — 71046 X-RAY EXAM CHEST 2 VIEWS: CPT

## 2021-08-23 PROCEDURE — 93970 EXTREMITY STUDY: CPT | Performed by: SURGERY

## 2021-08-23 PROCEDURE — 99214 OFFICE O/P EST MOD 30 MIN: CPT | Performed by: NURSE PRACTITIONER

## 2021-08-23 NOTE — PROGRESS NOTES
Assessment/Plan:    Physical assessment on is some inconclusive as concerning for possible venous insufficiency  Patient was informed of this  Patient also informed that routine labs have not been completed in over a year this is imperative to know how her electrolyte status is  Patient also has a complaint of a slight shortness of breath will order a quick chest x-ray to rule out any acute processes  Patient states she will get her labs done in a 1-2 days she does have a follow-up appointment with her cardiologist   Information will be given concerning results of the bilateral Doppler on her completion of the study  Advised patient will contact her results of studies are available  Until such time if symptoms significantly worsen she is advised to seek emergency care  Patient verbalized understanding she states she is very happy with the outcome of today's visit she will follow-up with cardiology  Patient verbalized agreement and understanding of the plan of care as outlined during the office visit today return to office as indicated or sooner if a problem arises  Problem List Items Addressed This Visit     None      Visit Diagnoses     Acute leg pain, left    -  Primary    Relevant Orders    VAS lower limb venous duplex study, complete bilateral    Acute leg pain, right        Relevant Orders    VAS lower limb venous duplex study, complete bilateral    Dyspnea on exertion        Relevant Orders    XR chest pa & lateral            Subjective:      Patient ID: Jared Tyler is a 70 y o  female  Patient presents today with bilateral lower extremity acute pains swelling tenderness and warmth  The skin is very red and she is concerned possibly for blood clot  Patient stated that the edema has been present for approximately 2 weeks and she is on her feet 50 60 hours a day  Patient does have a cardiologist but has not seen them in several months  Patient also has an outstanding order for labs  Could of been up to a year since patient had routine labs completed  Patient also states she is having slight amount of shortness of breath however is nothing acute she has had this off and on for several years once to 2 times a year  The following portions of the patient's history were reviewed and updated as appropriate: allergies, current medications, past family history, past medical history, past social history, past surgical history and problem list     Review of Systems   Constitutional: Negative for appetite change and fever  HENT: Negative for sinus pressure and sore throat  Eyes: Negative for pain  Respiratory: Positive for shortness of breath  Cardiovascular: Negative for chest pain  Gastrointestinal: Negative for abdominal pain  Genitourinary: Negative for dysuria  Musculoskeletal: Negative for arthralgias and myalgias  Bilateral lower extremity pain  Skin: Negative for color change  Neurological: Negative for light-headedness  Psychiatric/Behavioral: Negative for behavioral problems  Objective:      /80 (BP Location: Left arm, Patient Position: Sitting, Cuff Size: Standard)   Pulse 85   Temp 98 6 °F (37 °C) (Temporal)   Resp 16   Ht 5' 5" (1 651 m)   Wt 74 8 kg (165 lb)   SpO2 96%   BMI 27 46 kg/m²          Physical Exam  Vitals and nursing note reviewed  Constitutional:       General: She is not in acute distress  Appearance: She is well-developed  She is not diaphoretic  HENT:      Head: Normocephalic and atraumatic  Right Ear: External ear normal       Left Ear: External ear normal    Eyes:      Pupils: Pupils are equal, round, and reactive to light  Cardiovascular:      Rate and Rhythm: Normal rate and regular rhythm  Heart sounds: Normal heart sounds  Pulmonary:      Effort: Pulmonary effort is normal       Breath sounds: Normal breath sounds  Abdominal:      Palpations: Abdomen is soft     Musculoskeletal: General: Swelling and tenderness present  Normal range of motion  Cervical back: Normal range of motion and neck supple  Right lower leg: Edema present  Left lower leg: Edema present  Comments: Bilateral lower extremity swelling tenderness and 2+ pitting edema  Skin:     General: Skin is dry  Neurological:      Mental Status: She is alert and oriented to person, place, and time  Psychiatric:         Behavior: Behavior normal          Thought Content:  Thought content normal

## 2021-08-24 ENCOUNTER — APPOINTMENT (OUTPATIENT)
Dept: LAB | Facility: CLINIC | Age: 76
End: 2021-08-24
Payer: MEDICARE

## 2021-08-24 DIAGNOSIS — Z13.220 SCREENING CHOLESTEROL LEVEL: ICD-10-CM

## 2021-08-24 DIAGNOSIS — Z11.59 NEED FOR HEPATITIS C SCREENING TEST: ICD-10-CM

## 2021-08-24 DIAGNOSIS — R53.83 OTHER FATIGUE: ICD-10-CM

## 2021-08-24 DIAGNOSIS — Z11.59 ENCOUNTER FOR HEPATITIS C SCREENING TEST FOR LOW RISK PATIENT: ICD-10-CM

## 2021-08-24 LAB
ALBUMIN SERPL BCP-MCNC: 4.1 G/DL (ref 3.5–5)
ALP SERPL-CCNC: 28 U/L (ref 46–116)
ALT SERPL W P-5'-P-CCNC: 27 U/L (ref 12–78)
ANION GAP SERPL CALCULATED.3IONS-SCNC: 6 MMOL/L (ref 4–13)
AST SERPL W P-5'-P-CCNC: 15 U/L (ref 5–45)
BACTERIA UR QL AUTO: ABNORMAL /HPF
BASOPHILS # BLD AUTO: 0.03 THOUSANDS/ΜL (ref 0–0.1)
BASOPHILS NFR BLD AUTO: 1 % (ref 0–1)
BILIRUB SERPL-MCNC: 1.25 MG/DL (ref 0.2–1)
BILIRUB UR QL STRIP: NEGATIVE
BUN SERPL-MCNC: 13 MG/DL (ref 5–25)
CALCIUM SERPL-MCNC: 8.9 MG/DL (ref 8.3–10.1)
CHLORIDE SERPL-SCNC: 104 MMOL/L (ref 100–108)
CHOLEST SERPL-MCNC: 134 MG/DL (ref 50–200)
CLARITY UR: CLEAR
CO2 SERPL-SCNC: 27 MMOL/L (ref 21–32)
COLOR UR: YELLOW
CREAT SERPL-MCNC: 0.61 MG/DL (ref 0.6–1.3)
EOSINOPHIL # BLD AUTO: 0.22 THOUSAND/ΜL (ref 0–0.61)
EOSINOPHIL NFR BLD AUTO: 5 % (ref 0–6)
ERYTHROCYTE [DISTWIDTH] IN BLOOD BY AUTOMATED COUNT: 12 % (ref 11.6–15.1)
GFR SERPL CREATININE-BSD FRML MDRD: 90 ML/MIN/1.73SQ M
GLUCOSE P FAST SERPL-MCNC: 88 MG/DL (ref 65–99)
GLUCOSE UR STRIP-MCNC: NEGATIVE MG/DL
HCT VFR BLD AUTO: 39.6 % (ref 34.8–46.1)
HCV AB SER QL: NORMAL
HDLC SERPL-MCNC: 54 MG/DL
HGB BLD-MCNC: 13.6 G/DL (ref 11.5–15.4)
HGB UR QL STRIP.AUTO: NEGATIVE
HYALINE CASTS #/AREA URNS LPF: ABNORMAL /LPF
IMM GRANULOCYTES # BLD AUTO: 0.01 THOUSAND/UL (ref 0–0.2)
IMM GRANULOCYTES NFR BLD AUTO: 0 % (ref 0–2)
KETONES UR STRIP-MCNC: NEGATIVE MG/DL
LDLC SERPL CALC-MCNC: 61 MG/DL (ref 0–100)
LEUKOCYTE ESTERASE UR QL STRIP: ABNORMAL
LYMPHOCYTES # BLD AUTO: 1.17 THOUSANDS/ΜL (ref 0.6–4.47)
LYMPHOCYTES NFR BLD AUTO: 26 % (ref 14–44)
MAGNESIUM SERPL-MCNC: 2.3 MG/DL (ref 1.6–2.6)
MCH RBC QN AUTO: 31.6 PG (ref 26.8–34.3)
MCHC RBC AUTO-ENTMCNC: 34.3 G/DL (ref 31.4–37.4)
MCV RBC AUTO: 92 FL (ref 82–98)
MONOCYTES # BLD AUTO: 0.36 THOUSAND/ΜL (ref 0.17–1.22)
MONOCYTES NFR BLD AUTO: 8 % (ref 4–12)
NEUTROPHILS # BLD AUTO: 2.67 THOUSANDS/ΜL (ref 1.85–7.62)
NEUTS SEG NFR BLD AUTO: 60 % (ref 43–75)
NITRITE UR QL STRIP: NEGATIVE
NON-SQ EPI CELLS URNS QL MICRO: ABNORMAL /HPF
NRBC BLD AUTO-RTO: 0 /100 WBCS
PH UR STRIP.AUTO: 7 [PH]
PLATELET # BLD AUTO: 246 THOUSANDS/UL (ref 149–390)
PMV BLD AUTO: 9.5 FL (ref 8.9–12.7)
POTASSIUM SERPL-SCNC: 4 MMOL/L (ref 3.5–5.3)
PROT SERPL-MCNC: 7.2 G/DL (ref 6.4–8.2)
PROT UR STRIP-MCNC: NEGATIVE MG/DL
RBC # BLD AUTO: 4.31 MILLION/UL (ref 3.81–5.12)
RBC #/AREA URNS AUTO: ABNORMAL /HPF
SODIUM SERPL-SCNC: 137 MMOL/L (ref 136–145)
SP GR UR STRIP.AUTO: 1.01 (ref 1–1.03)
TRIGL SERPL-MCNC: 95 MG/DL
URATE SERPL-MCNC: 3.6 MG/DL (ref 2–6.8)
UROBILINOGEN UR QL STRIP.AUTO: 0.2 E.U./DL
WBC # BLD AUTO: 4.46 THOUSAND/UL (ref 4.31–10.16)
WBC #/AREA URNS AUTO: ABNORMAL /HPF

## 2021-08-24 PROCEDURE — 83735 ASSAY OF MAGNESIUM: CPT

## 2021-08-24 PROCEDURE — 80061 LIPID PANEL: CPT

## 2021-08-24 PROCEDURE — 80053 COMPREHEN METABOLIC PANEL: CPT

## 2021-08-24 PROCEDURE — 81001 URINALYSIS AUTO W/SCOPE: CPT | Performed by: FAMILY MEDICINE

## 2021-08-24 PROCEDURE — 84550 ASSAY OF BLOOD/URIC ACID: CPT

## 2021-08-24 PROCEDURE — 86803 HEPATITIS C AB TEST: CPT

## 2021-08-24 PROCEDURE — 85025 COMPLETE CBC W/AUTO DIFF WBC: CPT

## 2021-08-24 PROCEDURE — 36415 COLL VENOUS BLD VENIPUNCTURE: CPT

## 2021-08-25 DIAGNOSIS — K44.9 LARGE HIATAL HERNIA: Primary | ICD-10-CM

## 2021-08-25 RX ORDER — OMEPRAZOLE 20 MG/1
20 CAPSULE, DELAYED RELEASE ORAL
Qty: 90 CAPSULE | Refills: 3 | Status: SHIPPED | OUTPATIENT
Start: 2021-08-25

## 2021-09-11 DIAGNOSIS — E78.2 MIXED HYPERLIPIDEMIA: ICD-10-CM

## 2021-09-13 DIAGNOSIS — M54.50 LOW BACK PAIN WITHOUT SCIATICA, UNSPECIFIED BACK PAIN LATERALITY, UNSPECIFIED CHRONICITY: ICD-10-CM

## 2021-09-13 RX ORDER — ATORVASTATIN CALCIUM 20 MG/1
TABLET, FILM COATED ORAL
Qty: 90 TABLET | Refills: 1 | Status: SHIPPED | OUTPATIENT
Start: 2021-09-13

## 2021-09-13 RX ORDER — TRAMADOL HYDROCHLORIDE 50 MG/1
TABLET ORAL
Qty: 120 TABLET | Refills: 0 | Status: SHIPPED | OUTPATIENT
Start: 2021-09-13 | End: 2021-11-02

## 2021-10-19 ENCOUNTER — TELEPHONE (OUTPATIENT)
Dept: FAMILY MEDICINE CLINIC | Facility: CLINIC | Age: 76
End: 2021-10-19

## 2021-10-19 DIAGNOSIS — F41.9 ANXIETY: Primary | ICD-10-CM

## 2021-10-19 RX ORDER — ALPRAZOLAM 0.5 MG/1
TABLET ORAL
Qty: 2 TABLET | Refills: 0 | Status: SHIPPED | OUTPATIENT
Start: 2021-10-19 | End: 2022-03-15 | Stop reason: ALTCHOICE

## 2021-10-28 ENCOUNTER — CONSULT (OUTPATIENT)
Dept: GASTROENTEROLOGY | Facility: AMBULARY SURGERY CENTER | Age: 76
End: 2021-10-28
Payer: MEDICARE

## 2021-10-28 VITALS
WEIGHT: 162.8 LBS | SYSTOLIC BLOOD PRESSURE: 118 MMHG | BODY MASS INDEX: 27.12 KG/M2 | DIASTOLIC BLOOD PRESSURE: 78 MMHG | HEIGHT: 65 IN

## 2021-10-28 DIAGNOSIS — Z86.010 HISTORY OF COLON POLYPS: Primary | ICD-10-CM

## 2021-10-28 DIAGNOSIS — K44.9 LARGE HIATAL HERNIA: ICD-10-CM

## 2021-10-28 PROCEDURE — 99204 OFFICE O/P NEW MOD 45 MIN: CPT | Performed by: PHYSICIAN ASSISTANT

## 2021-10-28 RX ORDER — DIPHENOXYLATE HYDROCHLORIDE AND ATROPINE SULFATE 2.5; .025 MG/1; MG/1
1 TABLET ORAL DAILY
COMMUNITY
End: 2022-03-31

## 2021-10-28 RX ORDER — ASPIRIN 81 MG/1
81 TABLET ORAL DAILY
COMMUNITY
End: 2022-03-31

## 2021-10-28 RX ORDER — LANOLIN ALCOHOL/MO/W.PET/CERES
1 CREAM (GRAM) TOPICAL 3 TIMES DAILY
COMMUNITY
End: 2022-03-31

## 2021-10-28 RX ORDER — ROPINIROLE 2 MG/1
2 TABLET, FILM COATED ORAL 3 TIMES DAILY
COMMUNITY
End: 2022-03-31

## 2021-11-02 DIAGNOSIS — M54.50 LOW BACK PAIN WITHOUT SCIATICA, UNSPECIFIED BACK PAIN LATERALITY, UNSPECIFIED CHRONICITY: ICD-10-CM

## 2021-11-02 RX ORDER — TRAMADOL HYDROCHLORIDE 50 MG/1
TABLET ORAL
Qty: 120 TABLET | Refills: 0 | Status: SHIPPED | OUTPATIENT
Start: 2021-11-02 | End: 2022-03-31 | Stop reason: SDUPTHER

## 2021-11-05 ENCOUNTER — TRANSCRIBE ORDERS (OUTPATIENT)
Dept: LAB | Facility: CLINIC | Age: 76
End: 2021-11-05

## 2021-11-05 ENCOUNTER — APPOINTMENT (OUTPATIENT)
Dept: LAB | Facility: CLINIC | Age: 76
End: 2021-11-05
Payer: MEDICARE

## 2021-11-05 DIAGNOSIS — E78.00 HYPERCHOLESTEROLEMIA: ICD-10-CM

## 2021-11-05 DIAGNOSIS — I25.10 CORONARY ARTERY DISEASE, UNSPECIFIED VESSEL OR LESION TYPE, UNSPECIFIED WHETHER ANGINA PRESENT, UNSPECIFIED WHETHER NATIVE OR TRANSPLANTED HEART: ICD-10-CM

## 2021-11-05 DIAGNOSIS — I10 HYPERTENSION, UNSPECIFIED TYPE: ICD-10-CM

## 2021-11-05 DIAGNOSIS — Z79.899 ENCOUNTER FOR LONG-TERM (CURRENT) USE OF MEDICATIONS: ICD-10-CM

## 2021-11-05 DIAGNOSIS — E78.00 HYPERCHOLESTEROLEMIA: Primary | ICD-10-CM

## 2021-11-05 LAB
ANION GAP SERPL CALCULATED.3IONS-SCNC: 3 MMOL/L (ref 4–13)
BUN SERPL-MCNC: 14 MG/DL (ref 5–25)
CALCIUM SERPL-MCNC: 9.7 MG/DL (ref 8.3–10.1)
CHLORIDE SERPL-SCNC: 101 MMOL/L (ref 100–108)
CO2 SERPL-SCNC: 32 MMOL/L (ref 21–32)
CREAT SERPL-MCNC: 0.7 MG/DL (ref 0.6–1.3)
GFR SERPL CREATININE-BSD FRML MDRD: 86 ML/MIN/1.73SQ M
GLUCOSE SERPL-MCNC: 91 MG/DL (ref 65–140)
POTASSIUM SERPL-SCNC: 4.1 MMOL/L (ref 3.5–5.3)
SODIUM SERPL-SCNC: 136 MMOL/L (ref 136–145)

## 2021-11-05 PROCEDURE — 80048 BASIC METABOLIC PNL TOTAL CA: CPT

## 2021-11-05 PROCEDURE — 36415 COLL VENOUS BLD VENIPUNCTURE: CPT

## 2021-12-30 ENCOUNTER — TELEPHONE (OUTPATIENT)
Dept: GASTROENTEROLOGY | Facility: HOSPITAL | Age: 76
End: 2021-12-30

## 2022-02-07 DIAGNOSIS — I10 ESSENTIAL HYPERTENSION: ICD-10-CM

## 2022-02-07 RX ORDER — QUINAPRIL 40 MG/1
TABLET ORAL
Qty: 90 TABLET | Refills: 1 | Status: SHIPPED | OUTPATIENT
Start: 2022-02-07 | End: 2022-08-08

## 2022-03-15 ENCOUNTER — OFFICE VISIT (OUTPATIENT)
Dept: FAMILY MEDICINE CLINIC | Facility: CLINIC | Age: 77
End: 2022-03-15
Payer: MEDICARE

## 2022-03-15 ENCOUNTER — APPOINTMENT (OUTPATIENT)
Dept: RADIOLOGY | Facility: CLINIC | Age: 77
End: 2022-03-15
Payer: MEDICARE

## 2022-03-15 VITALS
TEMPERATURE: 98.2 F | OXYGEN SATURATION: 96 % | RESPIRATION RATE: 18 BRPM | SYSTOLIC BLOOD PRESSURE: 124 MMHG | WEIGHT: 167.8 LBS | BODY MASS INDEX: 27.96 KG/M2 | DIASTOLIC BLOOD PRESSURE: 74 MMHG | HEART RATE: 82 BPM | HEIGHT: 65 IN

## 2022-03-15 DIAGNOSIS — R06.02 SOB (SHORTNESS OF BREATH): ICD-10-CM

## 2022-03-15 DIAGNOSIS — J06.9 URI WITH COUGH AND CONGESTION: ICD-10-CM

## 2022-03-15 DIAGNOSIS — J06.9 URI WITH COUGH AND CONGESTION: Primary | ICD-10-CM

## 2022-03-15 PROCEDURE — 71046 X-RAY EXAM CHEST 2 VIEWS: CPT

## 2022-03-15 PROCEDURE — 99213 OFFICE O/P EST LOW 20 MIN: CPT | Performed by: NURSE PRACTITIONER

## 2022-03-15 RX ORDER — PREDNISONE 10 MG/1
10 TABLET ORAL 2 TIMES DAILY WITH MEALS
Qty: 10 TABLET | Refills: 0 | Status: SHIPPED | OUTPATIENT
Start: 2022-03-15 | End: 2022-03-20

## 2022-03-15 RX ORDER — BENZONATATE 100 MG/1
100 CAPSULE ORAL 3 TIMES DAILY PRN
Qty: 21 CAPSULE | Refills: 0 | Status: SHIPPED | OUTPATIENT
Start: 2022-03-15 | End: 2022-03-31

## 2022-03-15 RX ORDER — AZITHROMYCIN 250 MG/1
TABLET, FILM COATED ORAL
Qty: 6 TABLET | Refills: 0 | Status: SHIPPED | OUTPATIENT
Start: 2022-03-15 | End: 2022-03-20

## 2022-03-15 NOTE — PROGRESS NOTES
Assessment/Plan:  URI  Patient's symptoms were reviewed on vital signs are within normal limits patient does have some chest congestion advised that due to her subjective symptoms that a chest x-ray is warranted did order that stat advised patient to have that completed soon as possible will contact her with results  In the meantime will treat for possible viral or bacterial bronchitis since steroids and an antibiotic azithromycin to the pharmacy also Chanel Garcia for the cough  Will contact patient with results otherwise activity as tolerated if significantly worsens and or extreme SOB she is advised to seek emergency care  Dosing all possible side effects of the prescribed medications or medications that had been prescribed in the past were reviewed and all questions were answered  Patient verbalized agreement and understanding of the plan of care as outlined during the office visit today return to office as indicated or sooner if a problem arises  Problem List Items Addressed This Visit     None      Visit Diagnoses     URI with cough and congestion    -  Primary            Subjective:      Patient ID: Savage Avina is a 78 y o  female  HPI    The following portions of the patient's history were reviewed and updated as appropriate: allergies, current medications, past family history, past medical history, past social history, past surgical history and problem list     Review of Systems   Constitutional: Negative for chills  HENT: Positive for rhinorrhea and sore throat  Negative for congestion  Respiratory: Positive for cough  Objective:      /74 (BP Location: Left arm, Patient Position: Sitting, Cuff Size: Standard)   Pulse 82   Temp 98 2 °F (36 8 °C)   Resp 18   Ht 5' 5" (1 651 m)   Wt 76 1 kg (167 lb 12 8 oz)   SpO2 96%   BMI 27 92 kg/m²          Physical Exam  Vitals and nursing note reviewed  Constitutional:       General: She is not in acute distress  Appearance: She is well-developed  She is not diaphoretic  HENT:      Head: Normocephalic and atraumatic  Right Ear: External ear normal       Left Ear: External ear normal    Eyes:      Pupils: Pupils are equal, round, and reactive to light  Cardiovascular:      Rate and Rhythm: Normal rate and regular rhythm  Pulses: Normal pulses  Heart sounds: Normal heart sounds  Pulmonary:      Effort: Pulmonary effort is normal       Breath sounds: Normal breath sounds  Abdominal:      Palpations: Abdomen is soft  Skin:     General: Skin is dry  Neurological:      General: No focal deficit present  Mental Status: She is alert and oriented to person, place, and time  Psychiatric:         Behavior: Behavior normal          Thought Content:  Thought content normal

## 2022-03-25 ENCOUNTER — TELEPHONE (OUTPATIENT)
Dept: GASTROENTEROLOGY | Facility: AMBULARY SURGERY CENTER | Age: 77
End: 2022-03-25

## 2022-03-25 NOTE — TELEPHONE ENCOUNTER
Patient is scheduled for colonoscopy and EGD on May 26, 2022 at [facility] with Samy Rothman MD  Patient is aware of pre-procedure prep of Pt has bowel prep and they will be called the day prior between 2 and 6 pm for time to report for procedure  Pre-procedure prep has been given to the patient  pt has bowel prep and instructions on March 25 , 2022

## 2022-03-31 ENCOUNTER — OFFICE VISIT (OUTPATIENT)
Dept: FAMILY MEDICINE CLINIC | Facility: CLINIC | Age: 77
End: 2022-03-31
Payer: MEDICARE

## 2022-03-31 VITALS
HEIGHT: 65 IN | TEMPERATURE: 97.5 F | WEIGHT: 170.2 LBS | SYSTOLIC BLOOD PRESSURE: 126 MMHG | HEART RATE: 68 BPM | OXYGEN SATURATION: 98 % | DIASTOLIC BLOOD PRESSURE: 74 MMHG | RESPIRATION RATE: 18 BRPM | BODY MASS INDEX: 28.36 KG/M2

## 2022-03-31 DIAGNOSIS — M54.50 LOW BACK PAIN WITHOUT SCIATICA, UNSPECIFIED BACK PAIN LATERALITY, UNSPECIFIED CHRONICITY: ICD-10-CM

## 2022-03-31 DIAGNOSIS — I10 PRIMARY HYPERTENSION: ICD-10-CM

## 2022-03-31 DIAGNOSIS — Z12.31 ENCOUNTER FOR SCREENING MAMMOGRAM FOR BREAST CANCER: Primary | ICD-10-CM

## 2022-03-31 DIAGNOSIS — G25.81 RESTLESS LEGS SYNDROME (RLS): ICD-10-CM

## 2022-03-31 DIAGNOSIS — Z78.0 MENOPAUSE: ICD-10-CM

## 2022-03-31 DIAGNOSIS — E78.2 MIXED HYPERLIPIDEMIA: ICD-10-CM

## 2022-03-31 DIAGNOSIS — F11.20 CONTINUOUS OPIOID DEPENDENCE (HCC): ICD-10-CM

## 2022-03-31 DIAGNOSIS — J45.40 MODERATE PERSISTENT ASTHMA WITHOUT COMPLICATION: ICD-10-CM

## 2022-03-31 DIAGNOSIS — I38 VALVULAR HEART DISEASE: ICD-10-CM

## 2022-03-31 DIAGNOSIS — E85.9 AMYLOIDOSIS, UNSPECIFIED TYPE (HCC): ICD-10-CM

## 2022-03-31 DIAGNOSIS — K21.9 GASTROESOPHAGEAL REFLUX DISEASE WITHOUT ESOPHAGITIS: ICD-10-CM

## 2022-03-31 DIAGNOSIS — I89.0 LYMPHEDEMA: ICD-10-CM

## 2022-03-31 DIAGNOSIS — F32.1 CURRENT MODERATE EPISODE OF MAJOR DEPRESSIVE DISORDER WITHOUT PRIOR EPISODE (HCC): ICD-10-CM

## 2022-03-31 DIAGNOSIS — Z12.31 SCREENING MAMMOGRAM FOR HIGH-RISK PATIENT: ICD-10-CM

## 2022-03-31 PROCEDURE — 99214 OFFICE O/P EST MOD 30 MIN: CPT | Performed by: FAMILY MEDICINE

## 2022-03-31 RX ORDER — FLUTICASONE PROPIONATE AND SALMETEROL XINAFOATE 115; 21 UG/1; UG/1
2 AEROSOL, METERED RESPIRATORY (INHALATION) 2 TIMES DAILY
Qty: 12 G | Refills: 2 | Status: SHIPPED | OUTPATIENT
Start: 2022-03-31

## 2022-03-31 RX ORDER — TRAMADOL HYDROCHLORIDE 50 MG/1
50 TABLET ORAL EVERY 6 HOURS PRN
Qty: 120 TABLET | Refills: 0 | Status: SHIPPED | OUTPATIENT
Start: 2022-03-31 | End: 2022-05-09 | Stop reason: SDUPTHER

## 2022-03-31 RX ORDER — ALBUTEROL SULFATE 90 UG/1
2 AEROSOL, METERED RESPIRATORY (INHALATION) EVERY 6 HOURS PRN
Qty: 8.5 G | Refills: 1 | Status: SHIPPED | OUTPATIENT
Start: 2022-03-31

## 2022-03-31 RX ORDER — CHLORTHALIDONE 50 MG/1
50 TABLET ORAL DAILY
Qty: 90 TABLET | Refills: 1 | Status: SHIPPED | OUTPATIENT
Start: 2022-03-31 | End: 2022-08-10 | Stop reason: SDUPTHER

## 2022-03-31 NOTE — PROGRESS NOTES
BMI Counseling: There is no height or weight on file to calculate BMI  The BMI is above normal  Nutrition recommendations include decreasing portion sizes, encouraging healthy choices of fruits and vegetables, decreasing fast food intake, consuming healthier snacks, limiting drinks that contain sugar, moderation in carbohydrate intake, increasing intake of lean protein, reducing intake of saturated and trans fat and reducing intake of cholesterol  Exercise recommendations include exercising 3-5 times per week  No pharmacotherapy was ordered  Patient referred to PCP  Rationale for BMI follow-up plan is due to patient being overweight or obese  Depression Screening and Follow-up Plan: Patient assessed for underlying major depression  Brief counseling provided and recommend additional follow-up/re-evaluation next office visit  Patient advised to follow-up with PCP for further management  Falls Plan of Care: balance, strength, and gait training instructions were provided  Home safety education provided  Assessment/Plan:         Problem List Items Addressed This Visit        Cardiovascular and Mediastinum    Hypertension     Patient is stable with current anti-hypertensive medicine and continue to follow a low sodium diet and take current medication  All questions about this condition were answered today  Relevant Medications    chlorthalidone (HYGROTEN) 50 MG tablet    Other Relevant Orders    Basic metabolic panel       Other    Restless legs syndrome (RLS)     Patient is stable  and will continue present plan of care and reassess at next routine visit  All questions about this problem from patient were answered today  Hyperlipidemia     Patient  is stable with current medication and we discussed a low fat low cholesterol diet  Weight loss also discussed for this will help lower cholesterol also  Recheck lipids in 6 months           Current moderate episode of major depressive disorder without prior episode [facility])     Patient to continue utilizing medical therapy as well and counseling sources as applicable for condition  If  suicidal thought or fear of suicide to contact 911 and seek help immediately  Meds reviewed and patient questions answered today         Continuous opioid dependence ([facility] )    Amyloidosis, unspecified type ([facility] )      Other Visit Diagnoses     Encounter for screening mammogram for breast cancer    -  Primary    Relevant Orders    Mammo screening bilateral w 3d & cad    Menopause        Relevant Orders    DXA bone density spine hip and pelvis    Low back pain without sciatica, unspecified back pain laterality, unspecified chronicity        Relevant Medications    traMADol (ULTRAM) 50 mg tablet    Lymphedema        Gastroesophageal reflux disease without esophagitis        Valvular heart disease        Moderate persistent asthma without complication        Relevant Medications    fluticasone-salmeterol (Advair HFA) 115-21 MCG/ACT inhaler    albuterol (ProAir HFA) 90 mcg/act inhaler    Screening mammogram for high-risk patient        Relevant Orders    US breast left complete (abus)    US breast right complete (abus)            Subjective:      Patient ID: Jean Marie Germain is a 78 y o  female  This is a 76YOWF seen fo rher exam fo r multiple medical problems  Pt has HTN, lipids, osteoporosis, RLS and low back pain  Pt is  Doing wel with her meds and needs refills  Pt  Will need a dexa and mammogram  Pt has breast implants and will need U/S of breasts        The following portions of the patient's history were reviewed and updated as appropriate:   Past Medical History:  She has a past medical history of Arthritis, BBB (bundle branch block), Colon polyp, Current moderate episode of major depressive disorder without prior episode ([facility] ) (4/16/2021), Hyperlipidemia, Hypertension, Osteoporosis, and RLS (restless legs syndrome)  ,  _______________________________________________________________________  Medical Problems:  does not have any pertinent problems on file ,  _______________________________________________________________________  Past Surgical History:   has a past surgical history that includes Dilation and curettage of uterus; Nevus excision; Cataract extraction, bilateral; Colonoscopy (09/2015); Hernia repair (2013); Salpingectomy; AUGMENTATION BREAST (1976); and Colonoscopy  ,  _______________________________________________________________________  Family History:  family history includes Colon cancer in her maternal grandmother; Heart disease in her family; Hypertension in her family  ,  _______________________________________________________________________  Social History:   reports that she has never smoked  She has never used smokeless tobacco  She reports current alcohol use  She reports that she does not use drugs  ,  _______________________________________________________________________  Allergies:  is allergic to sertraline and ambien [zolpidem]     _______________________________________________________________________  Current Outpatient Medications   Medication Sig Dispense Refill    Cholecalciferol (VITAMIN D3 PO) Take by mouth      Cyanocobalamin (VITAMIN B12 PO) Take by mouth      GARLIC OIL PO Take by mouth      Omega-3 Fatty Acids (FISH OIL PO) Take by mouth      omeprazole (PriLOSEC) 20 mg delayed release capsule Take 1 capsule (20 mg total) by mouth daily before breakfast 90 capsule 3    traMADol (ULTRAM) 50 mg tablet Take 1 tablet (50 mg total) by mouth every 6 (six) hours as needed for moderate pain 120 tablet 0    albuterol (ProAir HFA) 90 mcg/act inhaler Inhale 2 puffs every 6 (six) hours as needed for wheezing 8 5 g 1    atorvastatin (LIPITOR) 20 mg tablet TAKE 1 TABLET(20 MG) BY MOUTH DAILY 90 tablet 1    CALCIUM-MAGNESIUM-ZINC PO Take by mouth      chlorthalidone (HYGROTEN) 50 MG tablet Take 1 tablet (50 mg total) by mouth daily 90 tablet 1    fluticasone-salmeterol (Advair HFA) 115-21 MCG/ACT inhaler Inhale 2 puffs 2 (two) times a day Rinse mouth after use  12 g 2    quinapril (ACCUPRIL) 40 MG tablet TAKE 1 TABLET(40 MG) BY MOUTH DAILY 90 tablet 1     No current facility-administered medications for this visit      _______________________________________________________________________  Review of Systems      Objective:  Vitals:    03/31/22 1602   BP: 126/74   BP Location: Left arm   Patient Position: Sitting   Cuff Size: Standard   Pulse: 68   Resp: 18   Temp: 97 5 °F (36 4 °C)   SpO2: 98%   Weight: 77 2 kg (170 lb 3 2 oz)   Height: 5' 5" (1 651 m)     Body mass index is 28 32 kg/m²       Physical Exam

## 2022-04-25 ENCOUNTER — APPOINTMENT (OUTPATIENT)
Dept: LAB | Facility: CLINIC | Age: 77
End: 2022-04-25
Payer: MEDICARE

## 2022-04-25 DIAGNOSIS — I10 PRIMARY HYPERTENSION: ICD-10-CM

## 2022-04-25 LAB
ANION GAP SERPL CALCULATED.3IONS-SCNC: 6 MMOL/L (ref 4–13)
BUN SERPL-MCNC: 20 MG/DL (ref 5–25)
CALCIUM SERPL-MCNC: 10.4 MG/DL (ref 8.3–10.1)
CHLORIDE SERPL-SCNC: 103 MMOL/L (ref 100–108)
CO2 SERPL-SCNC: 29 MMOL/L (ref 21–32)
CREAT SERPL-MCNC: 0.82 MG/DL (ref 0.6–1.3)
GFR SERPL CREATININE-BSD FRML MDRD: 70 ML/MIN/1.73SQ M
GLUCOSE P FAST SERPL-MCNC: 109 MG/DL (ref 65–99)
POTASSIUM SERPL-SCNC: 3.9 MMOL/L (ref 3.5–5.3)
SODIUM SERPL-SCNC: 138 MMOL/L (ref 136–145)

## 2022-04-25 PROCEDURE — 80048 BASIC METABOLIC PNL TOTAL CA: CPT

## 2022-04-25 PROCEDURE — 36415 COLL VENOUS BLD VENIPUNCTURE: CPT

## 2022-05-03 ENCOUNTER — HOSPITAL ENCOUNTER (OUTPATIENT)
Dept: RADIOLOGY | Facility: HOSPITAL | Age: 77
Discharge: HOME | End: 2022-05-03
Payer: MEDICARE

## 2022-05-03 VITALS — WEIGHT: 168 LBS | BODY MASS INDEX: 27.99 KG/M2 | HEIGHT: 65 IN

## 2022-05-03 DIAGNOSIS — Z12.31 ENCOUNTER FOR SCREENING MAMMOGRAM FOR BREAST CANCER: ICD-10-CM

## 2022-05-03 DIAGNOSIS — Z12.31 SCREENING MAMMOGRAM FOR HIGH-RISK PATIENT: ICD-10-CM

## 2022-05-03 PROCEDURE — 77067 SCR MAMMO BI INCL CAD: CPT

## 2022-05-03 PROCEDURE — 76641 ULTRASOUND BREAST COMPLETE: CPT

## 2022-05-03 PROCEDURE — 77063 BREAST TOMOSYNTHESIS BI: CPT

## 2022-05-06 RX ORDER — SODIUM CHLORIDE, SODIUM LACTATE, POTASSIUM CHLORIDE, CALCIUM CHLORIDE 600; 310; 30; 20 MG/100ML; MG/100ML; MG/100ML; MG/100ML
125 INJECTION, SOLUTION INTRAVENOUS CONTINUOUS
Status: CANCELLED | OUTPATIENT
Start: 2022-05-06

## 2022-05-07 ENCOUNTER — ANESTHESIA EVENT (OUTPATIENT)
Dept: GASTROENTEROLOGY | Facility: HOSPITAL | Age: 77
End: 2022-05-07

## 2022-05-07 ENCOUNTER — HOSPITAL ENCOUNTER (OUTPATIENT)
Dept: GASTROENTEROLOGY | Facility: HOSPITAL | Age: 77
Setting detail: OUTPATIENT SURGERY
Discharge: HOME | End: 2022-05-07
Attending: INTERNAL MEDICINE
Payer: MEDICARE

## 2022-05-07 ENCOUNTER — ANESTHESIA (OUTPATIENT)
Dept: GASTROENTEROLOGY | Facility: HOSPITAL | Age: 77
End: 2022-05-07

## 2022-05-07 VITALS
OXYGEN SATURATION: 98 % | DIASTOLIC BLOOD PRESSURE: 73 MMHG | RESPIRATION RATE: 18 BRPM | HEART RATE: 82 BPM | TEMPERATURE: 97 F | SYSTOLIC BLOOD PRESSURE: 155 MMHG

## 2022-05-07 DIAGNOSIS — Z86.010 HISTORY OF COLON POLYPS: ICD-10-CM

## 2022-05-07 DIAGNOSIS — K44.9 LARGE HIATAL HERNIA: ICD-10-CM

## 2022-05-07 PROCEDURE — 43235 EGD DIAGNOSTIC BRUSH WASH: CPT | Performed by: INTERNAL MEDICINE

## 2022-05-07 PROCEDURE — 88305 TISSUE EXAM BY PATHOLOGIST: CPT | Performed by: PATHOLOGY

## 2022-05-07 PROCEDURE — 45380 COLONOSCOPY AND BIOPSY: CPT | Performed by: INTERNAL MEDICINE

## 2022-05-07 RX ORDER — LIDOCAINE HYDROCHLORIDE 10 MG/ML
INJECTION, SOLUTION EPIDURAL; INFILTRATION; INTRACAUDAL; PERINEURAL AS NEEDED
Status: DISCONTINUED | OUTPATIENT
Start: 2022-05-07 | End: 2022-05-07

## 2022-05-07 RX ORDER — PROPOFOL 10 MG/ML
INJECTION, EMULSION INTRAVENOUS CONTINUOUS PRN
Status: DISCONTINUED | OUTPATIENT
Start: 2022-05-07 | End: 2022-05-07

## 2022-05-07 RX ORDER — SIMETHICONE 20 MG/.3ML
EMULSION ORAL CODE/TRAUMA/SEDATION MEDICATION
Status: COMPLETED | OUTPATIENT
Start: 2022-05-07 | End: 2022-05-07

## 2022-05-07 RX ORDER — SODIUM CHLORIDE, SODIUM LACTATE, POTASSIUM CHLORIDE, CALCIUM CHLORIDE 600; 310; 30; 20 MG/100ML; MG/100ML; MG/100ML; MG/100ML
125 INJECTION, SOLUTION INTRAVENOUS CONTINUOUS
Status: DISCONTINUED | OUTPATIENT
Start: 2022-05-07 | End: 2022-05-11 | Stop reason: HOSPADM

## 2022-05-07 RX ORDER — PROPOFOL 10 MG/ML
INJECTION, EMULSION INTRAVENOUS AS NEEDED
Status: DISCONTINUED | OUTPATIENT
Start: 2022-05-07 | End: 2022-05-07

## 2022-05-07 RX ADMIN — PROPOFOL 80 MCG/KG/MIN: 10 INJECTION, EMULSION INTRAVENOUS at 11:40

## 2022-05-07 RX ADMIN — Medication 40 MG: at 11:54

## 2022-05-07 RX ADMIN — SODIUM CHLORIDE, SODIUM LACTATE, POTASSIUM CHLORIDE, AND CALCIUM CHLORIDE 125 ML/HR: .6; .31; .03; .02 INJECTION, SOLUTION INTRAVENOUS at 11:28

## 2022-05-07 RX ADMIN — LIDOCAINE HYDROCHLORIDE 50 MG: 10 INJECTION, SOLUTION EPIDURAL; INFILTRATION; INTRACAUDAL at 11:38

## 2022-05-07 RX ADMIN — PROPOFOL 25 MG: 10 INJECTION, EMULSION INTRAVENOUS at 11:40

## 2022-05-07 RX ADMIN — PROPOFOL 50 MG: 10 INJECTION, EMULSION INTRAVENOUS at 11:38

## 2022-05-07 NOTE — ANESTHESIA POSTPROCEDURE EVALUATION
"Routing refill request to provider for review/approval because:  Needs sig; pt only uses PRN to left lower leg  This was originally ordered by ALANNA Garber, APRN 1/9/2019    Called pt to see what/if pt needs this. \"I just got low on it.\"   \"Every once in awhile I get a rash on my leg.\" He treats it with the cream and it goes away in a \"couple days.\"  The rash is back now.   Pt was also transferred to scheduling to make an appt with Dr. Ma, as he is overdue to be seen. Last OV 7/8/19: Return in about 3 months (around 10/8/2019) for diabetes recheck.     Enid SMITH RN        " Post-Op Assessment Note    CV Status:  Stable  Pain Score: 1    Pain management: adequate     Mental Status:  Awake   Hydration Status:  Stable   PONV Controlled:  None   Airway Patency:  Patent      Post Op Vitals Reviewed: Yes      Staff: Anesthesiologist         No complications documented      /53 (05/07/22 1205)    Temp (!) 97 °F (36 1 °C) (05/07/22 1205)    Pulse 76 (05/07/22 1205)   Resp 18 (05/07/22 1205)    SpO2 96 % (05/07/22 1205)

## 2022-05-07 NOTE — ANESTHESIA PREPROCEDURE EVALUATION
Procedure:  COLONOSCOPY  EGD    Relevant Problems   CARDIO   (+) Hyperlipidemia   (+) Hypertension      NEURO/PSYCH   (+) Continuous opioid dependence (HCC)   (+) Current moderate episode of major depressive disorder without prior episode [facility])        Physical Exam    Airway    Mallampati score: II  TM Distance: >3 FB  Neck ROM: full     Dental   No notable dental hx     Cardiovascular      Pulmonary      Other Findings        Anesthesia Plan  ASA Score- 2     Anesthesia Type- IV sedation with anesthesia with ASA Monitors  Additional Monitors:   Airway Plan:           Plan Factors-Exercise tolerance (METS): >4 METS  Chart reviewed  Existing labs reviewed  Patient summary reviewed  Patient is not a current smoker  Induction- intravenous  Postoperative Plan- Plan for postoperative opioid use  Informed Consent- Anesthetic plan and risks discussed with patient  I personally reviewed this patient with the CRNA  Discussed and agreed on the Anesthesia Plan with the CRNA  Delmar Benitez

## 2022-05-07 NOTE — H&P
History and Physical -  [facility]  Madison Bloch 78 y o  female MRN: [id number]        HPI: 79 y/o female with history of large hiatal hernia and hx colon polyps    Historical Information   Past Medical History:   Diagnosis Date    Arthritis     BBB (bundle branch block)     Colon polyp     Current moderate episode of major depressive disorder without prior episode ([facility] [address] ) 4/16/2021    Hyperlipidemia     Hypertension     Osteoporosis     RLS (restless legs syndrome)      Past Surgical History:   Procedure Laterality Date    AUGMENTATION BREAST  1976    AUGMENTATION MAMMAPLASTY Bilateral 1972    CATARACT EXTRACTION, BILATERAL      COLONOSCOPY  09/2015    COLONOSCOPY      DILATION AND CURETTAGE OF UTERUS      HERNIA REPAIR  2013    NEVUS EXCISION      SALPINGECTOMY      for endometrosis     Social History   Social History     Substance and Sexual Activity   Alcohol Use Yes    Comment: Rare     Social History     Substance and Sexual Activity   Drug Use Never    Comment: No use     Social History     Tobacco Use   Smoking Status Never Smoker   Smokeless Tobacco Never Used     Family History   Problem Relation Age of Onset    Heart disease Family     Hypertension Family     Colon cancer Maternal Grandmother [de-identified]       Meds/Allergies     (Not in a hospital admission)      Allergies   Allergen Reactions    Sertraline Visual Disturbance     Reaction Date: [id number];     Ambien [Zolpidem] Other (See Comments)     Was doing things she did not know she was doing       Objective     There were no vitals taken for this visit      PHYSICAL EXAM:    Gen: NAD  CV: S1 & S2 normal, RRR  CHEST: Clear to auscultate  ABD: soft, NT/ND, good bowel sounds  EXT: no edema    ASSESSMENT:     Hiatal hernia, Hx olyps    PLAN:    EGD and colonoscopy

## 2022-05-09 ENCOUNTER — OFFICE VISIT (OUTPATIENT)
Dept: FAMILY MEDICINE CLINIC | Facility: CLINIC | Age: 77
End: 2022-05-09
Payer: MEDICARE

## 2022-05-09 VITALS
WEIGHT: 164 LBS | RESPIRATION RATE: 18 BRPM | HEIGHT: 65 IN | OXYGEN SATURATION: 96 % | SYSTOLIC BLOOD PRESSURE: 122 MMHG | HEART RATE: 80 BPM | TEMPERATURE: 98.1 F | BODY MASS INDEX: 27.32 KG/M2 | DIASTOLIC BLOOD PRESSURE: 68 MMHG

## 2022-05-09 DIAGNOSIS — E78.2 MIXED HYPERLIPIDEMIA: ICD-10-CM

## 2022-05-09 DIAGNOSIS — G25.81 RESTLESS LEGS SYNDROME (RLS): ICD-10-CM

## 2022-05-09 DIAGNOSIS — Z78.0 MENOPAUSE: ICD-10-CM

## 2022-05-09 DIAGNOSIS — I10 PRIMARY HYPERTENSION: Primary | ICD-10-CM

## 2022-05-09 DIAGNOSIS — Z00.00 WELL ADULT EXAM: ICD-10-CM

## 2022-05-09 DIAGNOSIS — M54.50 LOW BACK PAIN WITHOUT SCIATICA, UNSPECIFIED BACK PAIN LATERALITY, UNSPECIFIED CHRONICITY: ICD-10-CM

## 2022-05-09 PROCEDURE — G0402 INITIAL PREVENTIVE EXAM: HCPCS | Performed by: FAMILY MEDICINE

## 2022-05-09 PROCEDURE — 99214 OFFICE O/P EST MOD 30 MIN: CPT | Performed by: FAMILY MEDICINE

## 2022-05-09 PROCEDURE — 1123F ACP DISCUSS/DSCN MKR DOCD: CPT | Performed by: FAMILY MEDICINE

## 2022-05-09 RX ORDER — PRAMIPEXOLE DIHYDROCHLORIDE 1 MG/1
2 TABLET ORAL DAILY
COMMUNITY
End: 2022-08-10 | Stop reason: SDUPTHER

## 2022-05-09 RX ORDER — TRAMADOL HYDROCHLORIDE 50 MG/1
50 TABLET ORAL EVERY 6 HOURS PRN
Qty: 120 TABLET | Refills: 0 | Status: SHIPPED | OUTPATIENT
Start: 2022-05-09 | End: 2022-08-10 | Stop reason: SDUPTHER

## 2022-05-09 NOTE — PATIENT INSTRUCTIONS
Medicare Preventive Visit Patient Instructions  Thank you for completing your Welcome to Medicare Visit or Medicare Annual Wellness Visit today  Your next wellness visit will be due in one year (5/10/2023)  The screening/preventive services that you may require over the next 5-10 years are detailed below  Some tests may not apply to you based off risk factors and/or age  Screening tests ordered at today's visit but not completed yet may show as past due  Also, please note that scanned in results may not display below  Preventive Screenings:  Service Recommendations Previous Testing/Comments   Colorectal Cancer Screening  * Colonoscopy    * Fecal Occult Blood Test (FOBT)/Fecal Immunochemical Test (FIT)  * Fecal DNA/Cologuard Test  * Flexible Sigmoidoscopy Age: 54-65 years old   Colonoscopy: every 10 years (may be performed more frequently if at higher risk)  OR  FOBT/FIT: every 1 year  OR  Cologuard: every 3 years  OR  Sigmoidoscopy: every 5 years  Screening may be recommended earlier than age 48 if at higher risk for colorectal cancer  Also, an individualized decision between you and your healthcare provider will decide whether screening between the ages of 74-80 would be appropriate  Colonoscopy: 05/07/2022  FOBT/FIT: Not on file  Cologuard: Not on file  Sigmoidoscopy: Not on file    Screening Current     Breast Cancer Screening Age: 36 years old  Frequency: every 1-2 years  Not required if history of left and right mastectomy Mammogram: 05/03/2022    Screening Current   Cervical Cancer Screening Between the ages of 21-29, pap smear recommended once every 3 years  Between the ages of 33-67, can perform pap smear with HPV co-testing every 5 years     Recommendations may differ for women with a history of total hysterectomy, cervical cancer, or abnormal pap smears in past  Pap Smear: Not on file    Screening Not Indicated   Hepatitis C Screening Once for adults born between 1945 and 1965  More frequently in patients at high risk for Hepatitis C Hep C Antibody: 08/24/2021    Screening Current   Diabetes Screening 1-2 times per year if you're at risk for diabetes or have pre-diabetes Fasting glucose: 109 mg/dL   A1C: No results in last 5 years    Screening Current   Cholesterol Screening Once every 5 years if you don't have a lipid disorder  May order more often based on risk factors  Lipid panel: 08/24/2021    Screening Not Indicated  History Lipid Disorder     Other Preventive Screenings Covered by Medicare:  1  Abdominal Aortic Aneurysm (AAA) Screening: covered once if your at risk  You're considered to be at risk if you have a family history of AAA  2  Lung Cancer Screening: covers low dose CT scan once per year if you meet all of the following conditions: (1) Age 50-69; (2) No signs or symptoms of lung cancer; (3) Current smoker or have quit smoking within the last 15 years; (4) You have a tobacco smoking history of at least 30 pack years (packs per day multiplied by number of years you smoked); (5) You get a written order from a healthcare provider  3  Glaucoma Screening: covered annually if you're considered high risk: (1) You have diabetes OR (2) Family history of glaucoma OR (3)  aged 48 and older OR (3)  American aged 72 and older  3  Osteoporosis Screening: covered every 2 years if you meet one of the following conditions: (1) You're estrogen deficient and at risk for osteoporosis based off medical history and other findings; (2) Have a vertebral abnormality; (3) On glucocorticoid therapy for more than 3 months; (4) Have primary hyperparathyroidism; (5) On osteoporosis medications and need to assess response to drug therapy  · Last bone density test (DXA Scan): Not on file  5  HIV Screening: covered annually if you're between the age of 12-76  Also covered annually if you are younger than 13 and older than 72 with risk factors for HIV infection   For pregnant patients, it is covered up to 3 times per pregnancy  Immunizations:  Immunization Recommendations   Influenza Vaccine Annual influenza vaccination during flu season is recommended for all persons aged >= 6 months who do not have contraindications   Pneumococcal Vaccine (Prevnar and Pneumovax)  * Prevnar = PCV13  * Pneumovax = PPSV23   Adults 25-60 years old: 1-3 doses may be recommended based on certain risk factors  Adults 72 years old: Prevnar (PCV13) vaccine recommended followed by Pneumovax (PPSV23) vaccine  If already received PPSV23 since turning 65, then PCV13 recommended at least one year after PPSV23 dose  Hepatitis B Vaccine 3 dose series if at intermediate or high risk (ex: diabetes, end stage renal disease, liver disease)   Tetanus (Td) Vaccine - COST NOT COVERED BY MEDICARE PART B Following completion of primary series, a booster dose should be given every 10 years to maintain immunity against tetanus  Td may also be given as tetanus wound prophylaxis  Tdap Vaccine - COST NOT COVERED BY MEDICARE PART B Recommended at least once for all adults  For pregnant patients, recommended with each pregnancy  Shingles Vaccine (Shingrix) - COST NOT COVERED BY MEDICARE PART B  2 shot series recommended in those aged 48 and above     Health Maintenance Due:      Topic Date Due    Breast Cancer Screening: Mammogram  05/03/2023    Colorectal Cancer Screening  05/06/2027    Hepatitis C Screening  Completed     Immunizations Due:      Topic Date Due    DTaP,Tdap,and Td Vaccines (1 - Tdap) Never done    Pneumococcal Vaccine: 65+ Years (1 of 2 - PPSV23) 11/30/2016    COVID-19 Vaccine (3 - Booster for Funmilayo Callas series) 08/11/2021     Advance Directives   What are advance directives? Advance directives are legal documents that state your wishes and plans for medical care  These plans are made ahead of time in case you lose your ability to make decisions for yourself   Advance directives can apply to any medical decision, such as the treatments you want, and if you want to donate organs  What are the types of advance directives? There are many types of advance directives, and each state has rules about how to use them  You may choose a combination of any of the following:  · Living will: This is a written record of the treatment you want  You can also choose which treatments you do not want, which to limit, and which to stop at a certain time  This includes surgery, medicine, IV fluid, and tube feedings  · Durable power of  for healthcare [facility]): This is a written record that states who you want to make healthcare choices for you when you are unable to make them for yourself  This person, called a proxy, is usually a family member or a friend  You may choose more than 1 proxy  · Do not resuscitate (DNR) order:  A DNR order is used in case your heart stops beating or you stop breathing  It is a request not to have certain forms of treatment, such as CPR  A DNR order may be included in other types of advance directives  · Medical directive: This covers the care that you want if you are in a coma, near death, or unable to make decisions for yourself  You can list the treatments you want for each condition  Treatment may include pain medicine, surgery, blood transfusions, dialysis, IV or tube feedings, and a ventilator (breathing machine)  · Values history: This document has questions about your views, beliefs, and how you feel and think about life  This information can help others choose the care that you would choose  Why are advance directives important? An advance directive helps you control your care  Although spoken wishes may be used, it is better to have your wishes written down  Spoken wishes can be misunderstood, or not followed  Treatments may be given even if you do not want them  An advance directive may make it easier for your family to make difficult choices about your care     Fall Prevention    Fall prevention includes ways to make your home and other areas safer  It also includes ways you can move more carefully to prevent a fall  Health conditions that cause changes in your blood pressure, vision, or muscle strength and coordination may increase your risk for falls  Medicines may also increase your risk for falls if they make you dizzy, weak, or sleepy  Fall prevention tips:   · Stand or sit up slowly  · Use assistive devices as directed  · Wear shoes that fit well and have soles that   · Wear a personal alarm  · Stay active  · Manage your medical conditions  Home Safety Tips:  · Add items to prevent falls in the bathroom  · Keep paths clear  · Install bright lights in your home  · Keep items you use often on shelves within reach  · Paint or place reflective tape on the edges of your stairs  Weight Management   Why it is important to manage your weight:  Being overweight increases your risk of health conditions such as heart disease, high blood pressure, type 2 diabetes, and certain types of cancer  It can also increase your risk for osteoarthritis, sleep apnea, and other respiratory problems  Aim for a slow, steady weight loss  Even a small amount of weight loss can lower your risk of health problems  How to lose weight safely:  A safe and healthy way to lose weight is to eat fewer calories and get regular exercise  You can lose up about 1 pound a week by decreasing the number of calories you eat by 500 calories each day  Healthy meal plan for weight management:  A healthy meal plan includes a variety of foods, contains fewer calories, and helps you stay healthy  A healthy meal plan includes the following:  · Eat whole-grain foods more often  A healthy meal plan should contain fiber  Fiber is the part of grains, fruits, and vegetables that is not broken down by your body  Whole-grain foods are healthy and provide extra fiber in your diet   Some examples of whole-grain foods are whole-wheat breads and pastas, oatmeal, brown rice, and bulgur  · Eat a variety of vegetables every day  Include dark, leafy greens such as spinach, kale, michael greens, and mustard greens  Eat yellow and orange vegetables such as carrots, sweet potatoes, and winter squash  · Eat a variety of fruits every day  Choose fresh or canned fruit (canned in its own juice or light syrup) instead of juice  Fruit juice has very little or no fiber  · Eat low-fat dairy foods  Drink fat-free (skim) milk or 1% milk  Eat fat-free yogurt and low-fat cottage cheese  Try low-fat cheeses such as mozzarella and other reduced-fat cheeses  · Choose meat and other protein foods that are low in fat  Choose beans or other legumes such as split peas or lentils  Choose fish, skinless poultry (chicken or turkey), or lean cuts of red meat (beef or pork)  Before you cook meat or poultry, cut off any visible fat  · Use less fat and oil  Try baking foods instead of frying them  Add less fat, such as margarine, sour cream, regular salad dressing and mayonnaise to foods  Eat fewer high-fat foods  Some examples of high-fat foods include french fries, doughnuts, ice cream, and cakes  · Eat fewer sweets  Limit foods and drinks that are high in sugar  This includes candy, cookies, regular soda, and sweetened drinks  Exercise:  Exercise at least 30 minutes per day on most days of the week  Some examples of exercise include walking, biking, dancing, and swimming  You can also fit in more physical activity by taking the stairs instead of the elevator or parking farther away from stores  Ask your healthcare provider about the best exercise plan for you  © Copyright [employer] 2018 Information is for End User's use only and may not be sold, redistributed or otherwise used for commercial purposes   All illustrations and images included in CareNotes® are the copyrighted property of [employer]  or [address] [place] [address] [employer]

## 2022-05-09 NOTE — PROGRESS NOTES
Assessment and Plan:     Problem List Items Addressed This Visit        Cardiovascular and Mediastinum    Hypertension - Primary     Patient is stable with current anti-hypertensive medicine and continue to follow a low sodium diet and take current medication  All questions about this condition were answered today  Other    Restless legs syndrome (RLS)     Patient is stable  and will continue present plan of care and reassess at next routine visit  All questions about this problem from patient were answered today  Hyperlipidemia     Patient  is stable with current medication and we discussed a low fat low cholesterol diet  Weight loss also discussed for this will help lower cholesterol also  Recheck lipids in 6 months  Other Visit Diagnoses     Well adult exam        Menopause               Preventive health issues were discussed with patient, and age appropriate screening tests were ordered as noted in patient's After Visit Summary  Personalized health advice and appropriate referrals for health education or preventive services given if needed, as noted in patient's After Visit Summary  History of Present Illness:     Patient presents for Welcome to Medicare visit       Patient Care Team:  Brooke Alvarado MD as PCP - General (Family Medicine)     Review of Systems:     Review of Systems   Problem List:     Patient Active Problem List   Diagnosis    Hypertension    Restless legs syndrome (RLS)    Hyperlipidemia    Current moderate episode of major depressive disorder without prior episode ([facility] )    Abnormal CXR    Continuous opioid dependence ([facility] )    Amyloidosis, unspecified type [facility])      Past Medical and Surgical History:     Past Medical History:   Diagnosis Date    Arthritis     BBB (bundle branch block)     Colon polyp     Current moderate episode of major depressive disorder without prior episode ([facility] ) 4/16/2021    Hyperlipidemia     Hypertension     Osteoporosis     RLS (restless legs syndrome)      Past Surgical History:   Procedure Laterality Date    AUGMENTATION BREAST  1976    AUGMENTATION MAMMAPLASTY Bilateral 1972    CATARACT EXTRACTION, BILATERAL      COLONOSCOPY  2015    COLONOSCOPY      DILATION AND CURETTAGE OF UTERUS      HERNIA REPAIR      NEVUS EXCISION      SALPINGECTOMY      for endometrosis      Family History:     Family History   Problem Relation Age of Onset    Heart disease Family     Hypertension Family     Colon cancer Maternal Grandmother [de-identified]      Social History:     Social History     Socioeconomic History    Marital status:      Spouse name: None    Number of children: None    Years of education: None    Highest education level: None   Occupational History    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   Tobacco Use    Smoking status: Never Smoker    Smokeless tobacco: Never Used   Vaping Use    Vaping Use: Never used   Substance and Sexual Activity    Alcohol use: Yes     Comment: Rare    Drug use: Never     Comment: No use    Sexual activity: None   Other Topics Concern    None   Social History Narrative    · Most recent tobacco use screenin2019      · Do you currently or have you served in the [employer]:   No      · Were you activated, into active duty, as a member of the [employer] or as a Reservist:   No      · 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      · Exercise level: Moderate      · Diet:   Regular      · General stress level:   High      · Alcohol intake:   None      · Caffeine intake: Moderate      · Seat belts used routinely:   Yes      · Sunscreen used routinely:   Yes      · Smoke alarm in home:    Yes      · Advance directive:   No      · Has the Patient had a mammogram to screen for breast cancer within 24 months:   Yes      · Live alone or with others:   alone      · Are you currently employed:   Yes      Social Determinants of Health     Financial Resource Strain: Not on file   Food Insecurity: Not on file   Transportation Needs: Not on file   Physical Activity: Not on file   Stress: Not on file   Social Connections: Not on file   Intimate Partner Violence: Not on file   Housing Stability: Not on file      Medications and Allergies:     Current Outpatient Medications   Medication Sig Dispense Refill    albuterol (ProAir HFA) 90 mcg/act inhaler Inhale 2 puffs every 6 (six) hours as needed for wheezing 8 5 g 1    atorvastatin (LIPITOR) 20 mg tablet TAKE 1 TABLET(20 MG) BY MOUTH DAILY 90 tablet 1    chlorthalidone (HYGROTEN) 50 MG tablet Take 1 tablet (50 mg total) by mouth daily 90 tablet 1    Cholecalciferol (VITAMIN D3 PO) Take by mouth      Cyanocobalamin (VITAMIN B12 PO) Take by mouth      fluticasone-salmeterol (Advair HFA) 115-21 MCG/ACT inhaler Inhale 2 puffs 2 (two) times a day Rinse mouth after use  12 g 2    GARLIC OIL PO Take by mouth      Omega-3 Fatty Acids (FISH OIL PO) Take by mouth      omeprazole (PriLOSEC) 20 mg delayed release capsule Take 1 capsule (20 mg total) by mouth daily before breakfast 90 capsule 3    quinapril (ACCUPRIL) 40 MG tablet TAKE 1 TABLET(40 MG) BY MOUTH DAILY 90 tablet 1    traMADol (ULTRAM) 50 mg tablet Take 1 tablet (50 mg total) by mouth every 6 (six) hours as needed for moderate pain 120 tablet 0     No current facility-administered medications for this visit       Facility-Administered Medications Ordered in Other Visits   Medication Dose Route Frequency Provider Last Rate Last Admin    lactated ringers infusion  125 mL/hr Intravenous Continuous Jesus Boyce  mL/hr at 05/07/22 1138 Continue from Pre-op at 05/07/22 1138     Allergies   Allergen Reactions    Sertraline Visual Disturbance     Reaction Date: [id number];     Ambien [Zolpidem] Other (See Comments)     Was doing things she did not know she was doing      Immunizations:     Immunization History   Administered Date(s) Administered    COVID-19 Catrachito Saloni VACC 0 5 ML IM 02/12/2021, 03/11/2021    H1N1, All Formulations 12/04/2009    INFLUENZA 09/16/2015, 11/04/2016, 10/21/2017, 11/05/2020, 10/20/2021    Influenza, high dose seasonal 0 7 mL 11/05/2020    Influenza, seasonal, injectable 10/11/2007, 11/04/2008, 09/01/2009    Pneumococcal Conjugate 13-Valent 10/05/2016    Zoster 05/01/2017      Health Maintenance:         Topic Date Due    Breast Cancer Screening: Mammogram  05/03/2023    Colorectal Cancer Screening  05/06/2027    Hepatitis C Screening  Completed         Topic Date Due    DTaP,Tdap,and Td Vaccines (1 - Tdap) Never done    Pneumococcal Vaccine: 65+ Years (1 of 2 - PPSV23) 11/30/2016    COVID-19 Vaccine (3 - Booster for [place] series) 08/11/2021      Medicare Screening Tests and Risk Assessments:     Beatrice Rinaldi is here for her Subsequent Wellness visit  Health Risk Assessment:   Patient rates overall health as fair  Patient feels that their physical health rating is slightly worse  Patient is satisfied with their life  Eyesight was rated as same  Hearing was rated as same  Patient feels that their emotional and mental health rating is slightly better  Patients states they are never, rarely angry  Patient states they are sometimes unusually tired/fatigued  Pain experienced in the last 7 days has been some  Patient's pain rating has been 7/10  Patient states that she has experienced no weight loss or gain in last 6 months  Depression Screening:   PHQ-9 Score: 0      Fall Risk Screening: In the past year, patient has experienced: history of falling in past year    Number of falls: 2 or more  Injured during fall?: No    Feels unsteady when standing or walking?: No    Worried about falling?: No      Urinary Incontinence Screening:   Patient has not leaked urine accidently in the last six months  Home Safety:  Patient has trouble with stairs inside or outside of their home   Patient has working smoke alarms and has working carbon monoxide detector  Home safety hazards include: none  Nutrition:   Current diet is Regular  Medications:   Patient is not currently taking any over-the-counter supplements  Patient is able to manage medications  Activities of Daily Living (ADLs)/Instrumental Activities of Daily Living (IADLs):   Walk and transfer into and out of bed and chair?: Yes  Dress and groom yourself?: Yes    Bathe or shower yourself?: Yes    Feed yourself? Yes  Do your laundry/housekeeping?: Yes  Manage your money, pay your bills and track your expenses?: Yes  Make your own meals?: Yes    Do your own shopping?: Yes    Previous Hospitalizations:   Any hospitalizations or ED visits within the last 12 months?: No      Advance Care Planning:   Living will: Yes    Advanced directive: Yes      Cognitive Screening:   Provider or family/friend/caregiver concerned regarding cognition?: No    PREVENTIVE SCREENINGS      Cardiovascular Screening:    General: Screening Not Indicated and History Lipid Disorder      Diabetes Screening:     General: Screening Current      Colorectal Cancer Screening:     General: Screening Current      Breast Cancer Screening:     General: Screening Current      Cervical Cancer Screening:    General: Screening Not Indicated      Lung Cancer Screening:     General: Screening Not Indicated      Hepatitis C Screening:    General: Screening Current    Screening, Brief Intervention, and Referral to Treatment (SBIRT)    Screening  Typical number of drinks in a day: 0  Typical number of drinks in a week: 0  Interpretation: Low risk drinking behavior      Single Item Drug Screening:  How often have you used an illegal drug (including marijuana) or a prescription medication for non-medical reasons in the past year? never    Single Item Drug Screen Score: 0  Interpretation: Negative screen for possible drug use disorder    No exam data present     Physical Exam:     /68 (BP Location: Left arm, Patient Position: Sitting, Cuff Size: Standard)   Pulse 80   Temp 98 1 °F (36 7 °C)   Resp 18   Ht 5' 5" (1 651 m)   Wt 74 4 kg (164 lb)   SpO2 96%   BMI 27 29 kg/m²     Physical Exam     Trinity Kaufman MD

## 2022-05-09 NOTE — PROGRESS NOTES
Depression Screening and Follow-up Plan: Patient assessed for underlying major depression  Brief counseling provided and recommend additional follow-up/re-evaluation next office visit  Patient advised to follow-up with PCP for further management  Falls Plan of Care: balance, strength, and gait training instructions were provided  Home safety education provided  Assessment/Plan:         Problem List Items Addressed This Visit        Cardiovascular and Mediastinum    Hypertension - Primary     Patient is stable with current anti-hypertensive medicine and continue to follow a low sodium diet and take current medication  All questions about this condition were answered today  Other    Restless legs syndrome (RLS)     Patient is stable  and will continue present plan of care and reassess at next routine visit  All questions about this problem from patient were answered today  Hyperlipidemia     Patient  is stable with current medication and we discussed a low fat low cholesterol diet  Weight loss also discussed for this will help lower cholesterol also  Recheck lipids in 6 months  Other Visit Diagnoses     Well adult exam        Menopause                Subjective:      Patient ID: Ragena Current is a 78 y o  female  This 68-year-old female here today for checkup on multiple medical problems  Patient with restless leg syndrome history of arthritis history of depression hyperlipidemia hypertension and is doing well  Patient is having chronic back pain as well as a chronic knee pain as looking for refill her tramadol which she has not had for for months  Will refill that for her  Also confirm with her that she is getting medicine for restless leg and she is doing well with that  Patient we do for her yearly evaluation after the middle of August and she schedule her DEXA scan in next few days        The following portions of the patient's history were reviewed and updated as appropriate:   Past Medical History:  She has a past medical history of Arthritis, BBB (bundle branch block), Colon polyp, Current moderate episode of major depressive disorder without prior episode ([address] ) (4/16/2021), Hyperlipidemia, Hypertension, Osteoporosis, and RLS (restless legs syndrome)  ,  _______________________________________________________________________  Medical Problems:  does not have any pertinent problems on file ,  _______________________________________________________________________  Past Surgical History:   has a past surgical history that includes Dilation and curettage of uterus; Nevus excision; Cataract extraction, bilateral; Colonoscopy (09/2015); Hernia repair (2013); Salpingectomy; AUGMENTATION BREAST (1976); Colonoscopy; and Augmentation mammaplasty (Bilateral, 1972)  ,  _______________________________________________________________________  Family History:  family history includes Colon cancer (age of onset: [de-identified]) in her maternal grandmother; Heart disease in her family; Hypertension in her family  ,  _______________________________________________________________________  Social History:   reports that she has never smoked  She has never used smokeless tobacco  She reports current alcohol use  She reports that she does not use drugs  ,  _______________________________________________________________________  Allergies:  is allergic to sertraline and ambien [zolpidem]     _______________________________________________________________________  Current Outpatient Medications   Medication Sig Dispense Refill    albuterol (ProAir HFA) 90 mcg/act inhaler Inhale 2 puffs every 6 (six) hours as needed for wheezing 8 5 g 1    atorvastatin (LIPITOR) 20 mg tablet TAKE 1 TABLET(20 MG) BY MOUTH DAILY 90 tablet 1    chlorthalidone (HYGROTEN) 50 MG tablet Take 1 tablet (50 mg total) by mouth daily 90 tablet 1    Cholecalciferol (VITAMIN D3 PO) Take by mouth      Cyanocobalamin (VITAMIN B12 PO) Take by mouth      fluticasone-salmeterol (Advair HFA) 115-21 MCG/ACT inhaler Inhale 2 puffs 2 (two) times a day Rinse mouth after use  12 g 2    GARLIC OIL PO Take by mouth      Omega-3 Fatty Acids (FISH OIL PO) Take by mouth      omeprazole (PriLOSEC) 20 mg delayed release capsule Take 1 capsule (20 mg total) by mouth daily before breakfast 90 capsule 3    quinapril (ACCUPRIL) 40 MG tablet TAKE 1 TABLET(40 MG) BY MOUTH DAILY 90 tablet 1    traMADol (ULTRAM) 50 mg tablet Take 1 tablet (50 mg total) by mouth every 6 (six) hours as needed for moderate pain 120 tablet 0     No current facility-administered medications for this visit  Facility-Administered Medications Ordered in Other Visits   Medication Dose Route Frequency Provider Last Rate Last Admin    lactated ringers infusion  125 mL/hr Intravenous Continuous Makeda Aj  mL/hr at 05/07/22 1138 Continue from Pre-op at 05/07/22 1138     _______________________________________________________________________  Review of Systems   Constitutional: Negative for activity change, appetite change, fatigue and fever  HENT: Negative for congestion, ear pain, postnasal drip, rhinorrhea, sinus pressure, sinus pain, sneezing and sore throat  Eyes: Negative for pain and redness  Respiratory: Negative for apnea, cough, chest tightness, shortness of breath and wheezing  Cardiovascular: Negative for chest pain, palpitations and leg swelling  Gastrointestinal: Negative for abdominal pain, constipation, diarrhea, nausea and vomiting  Endocrine: Negative for cold intolerance and heat intolerance  Genitourinary: Negative for difficulty urinating, dysuria, frequency, hematuria and urgency  Musculoskeletal: Negative for arthralgias, back pain, gait problem and myalgias  Skin: Negative for rash  Neurological: Negative for dizziness, speech difficulty, weakness, numbness and headaches  Hematological: Does not bruise/bleed easily  Psychiatric/Behavioral: Negative for agitation, confusion and hallucinations  Objective: There were no vitals filed for this visit  There is no height or weight on file to calculate BMI  Physical Exam  Vitals and nursing note reviewed  Constitutional:       Appearance: She is well-developed  HENT:      Head: Normocephalic and atraumatic  Nose: Nose normal    Eyes:      General: No scleral icterus  Conjunctiva/sclera: Conjunctivae normal       Pupils: Pupils are equal, round, and reactive to light  Neck:      Thyroid: No thyromegaly  Cardiovascular:      Rate and Rhythm: Normal rate and regular rhythm  Pulmonary:      Effort: Pulmonary effort is normal       Breath sounds: Normal breath sounds  No wheezing  Abdominal:      General: Bowel sounds are normal  There is no distension  Palpations: Abdomen is soft  Tenderness: There is no abdominal tenderness  There is no guarding or rebound  Musculoskeletal:         General: No tenderness or deformity  Normal range of motion  Cervical back: Normal range of motion and neck supple  Skin:     General: Skin is warm and dry  Findings: No erythema or rash  Neurological:      Mental Status: She is alert and oriented to person, place, and time  Sensory: No sensory deficit  Gait: Gait abnormal    Psychiatric:         Behavior: Behavior normal          Thought Content:  Thought content normal          Judgment: Judgment normal

## 2022-05-10 ENCOUNTER — TELEPHONE (OUTPATIENT)
Dept: FAMILY MEDICINE CLINIC | Facility: CLINIC | Age: 77
End: 2022-05-10

## 2022-05-10 DIAGNOSIS — R26.9 GAIT ABNORMALITY: Primary | ICD-10-CM

## 2022-05-10 NOTE — TELEPHONE ENCOUNTER
Pt called and stated that she needs the order for the Balance Evaluation per Gaby Rodriguez at [facility]       Fax [phone]  Pt has appt on 05/16/2022  Thanks

## 2022-05-16 ENCOUNTER — EVALUATION (OUTPATIENT)
Dept: PHYSICAL THERAPY | Facility: REHABILITATION | Age: 77
End: 2022-05-16
Payer: MEDICARE

## 2022-05-16 DIAGNOSIS — R26.9 GAIT ABNORMALITY: Primary | ICD-10-CM

## 2022-05-16 PROCEDURE — 97161 PT EVAL LOW COMPLEX 20 MIN: CPT | Performed by: PHYSICAL THERAPIST

## 2022-05-16 PROCEDURE — 97112 NEUROMUSCULAR REEDUCATION: CPT | Performed by: PHYSICAL THERAPIST

## 2022-05-16 NOTE — PROGRESS NOTES
PT Evaluation     Today's date: 2022  Patient name: Fred Mueller  : 1945  MRN: [id number]  Referring provider: Sonja Boateng MD  Dx:   Encounter Diagnosis     ICD-10-CM    1  Gait abnormality  R26 9        Start Time: 6080  Stop Time: 9641  Total time in clinic (min): 58 minutes    Assessment  Assessment details: Fred Mueller is a 78y o  year old female who presents with impaired balance  Current deficits include decreased dynamic balance, increased risk for falls, and decreased LE strength  These deficits impair her ability to perform all typical I/ADLs, household tasks, and social/recreational tasks  Recommend skilled PT services to address previously stated deficits to promote progress towards PLOF and decrease risk for falls and future injury  Impairments: abnormal gait, activity intolerance, impaired balance, impaired physical strength and lacks appropriate home exercise program  Understanding of Dx/Px/POC: good   Prognosis: good    Goals  STG (4 weeks):  1  Increase FGA score to at least 18 to indicate improving dynamic balance  2  Increase 30" sit to stand to at least 7 reps to progress towards age normative values  LTG (8 weeks):  1  Able to ambulate on uneven surfaces confidently without fear of losing balance  2  Increase FGA score to at least 22 to indicate decreased risk for falls  3  Able to ascend/descend 1 flight of stairs without compensation to promote improved household and community ambulation  4  Independent with HEP  Plan  Plan details: Thank you for referring Fred Mueller to Physical Therapy at [place] [address] and for the opportunity to coordinate care      Patient would benefit from: skilled physical therapy  Planned therapy interventions: abdominal trunk stabilization, activity modification, ADL retraining, balance, balance/weight bearing training, behavior modification, body mechanics training, breathing training, fine motor coordination training, flexibility, functional ROM exercises, gait training, graded activity, graded exercise, graded motor, home exercise program, work reintegration, transfer training, therapeutic training, therapeutic exercise, therapeutic activities, stretching, strengthening, self care, postural training, patient education, neuromuscular re-education, muscle pump exercises, motor coordination training, Rizzo taping, manual therapy, joint mobilization and IADL retraining  Frequency: 2x week  Duration in visits: Marianela Jordan beginning date: 5/16/2022  Treatment plan discussed with: patient        Subjective Evaluation    History of Present Illness  Mechanism of injury:   05/16/22:  Wynell Kawasaki presents to skilled physical therapy with complaints of difficulty walking when walking outdoors and feels off balance  Reports she has fallen twice, most recent being 4 weeks ago; states she caught herself on her elbows  Patient reports she started to notice decreased balance over the past two months  Since onset, symptoms have stayed the same  Currently, Arpita Khan is having difficulty with walking on uneven surfaces and standing  Patient denies numbness, tingling in right leg, left leg, right foot and left foot  Next follow up appointment with physician is schedule in August 2022      Pain  No pain reported    Social Support  Steps to enter house: yes  1  Stairs in house: yes   Lives in: multiple-level home  Lives with: alone    Employment status: working  Treatments  Current treatment: physical therapy  Patient Goals  Patient goals for therapy: improved balance          Objective     Strength/Myotome Testing     Left Hip   Planes of Motion   Flexion: 4  Abduction: 4+  Adduction: 4+  External rotation: 4 (L knee pain)  Internal rotation: 4+    Right Hip   Planes of Motion   Flexion: 4+  Abduction: 4+  Adduction: 4+  External rotation: 4+  Internal rotation: 4+    Left Knee   Flexion: 4+  Extension: 4+    Right Knee   Flexion: 4+  Extension: 4+    Left Ankle/Foot   Dorsiflexion: 4+  Plantar flexion: 4+    Right Ankle/Foot   Dorsiflexion: 4+  Plantar flexion: 4+  Neuro Exam:     Functional outcomes   Functional outcome comment: 30" sit to stand: 5 (reported bilateral knee discomfort)  TU seconds, no AD  mCTSIB:     Phase 1: 60"     Phase 2: 60"     Phase 3: 60"     Phase 4: 60"  Gait: no reciprocal arm swing, minimal trunk rotation  FGA: 15/30 with score of 22/30 or less indicative of increased fall risk                 Precautions: Fall Risk, Arthritis, Depression, HLD, HTN, Osteoporosis, bilateral knee pain, history of LBP     *indicates included in HEP                 Neuro Re-Ed             NBOS balance             Standing with head turns  nv           Tandem balance  nv           SL balance             Tandem walking nv            Walking with head turns nv                         Patient education done                         Ther Exercise             Bike  nv            Bridges  nv            Clamshells  nv            Heel raises nv            Toe raises nv                         Ther Activity             Forward step ups             Lateral step ups

## 2022-05-19 ENCOUNTER — OFFICE VISIT (OUTPATIENT)
Dept: PHYSICAL THERAPY | Facility: REHABILITATION | Age: 77
End: 2022-05-19
Payer: MEDICARE

## 2022-05-19 DIAGNOSIS — R26.9 GAIT ABNORMALITY: Primary | ICD-10-CM

## 2022-05-19 PROCEDURE — 97110 THERAPEUTIC EXERCISES: CPT | Performed by: PHYSICAL THERAPIST

## 2022-05-19 PROCEDURE — 97112 NEUROMUSCULAR REEDUCATION: CPT | Performed by: PHYSICAL THERAPIST

## 2022-05-19 NOTE — PROGRESS NOTES
Daily Note     Today's date: 2022  Patient name: Wynell Kawasaki  : 1945  MRN: [id number]  Referring provider: Ashwin Storm MD  Dx:   Encounter Diagnosis     ICD-10-CM    1  Gait abnormality  R26 9        Start Time:   Stop Time:   Total time in clinic (min): 48 minutes    Subjective: Audrene Stamp reports falling and sliding down a grassy hill about 1 hour prior to today's session  Denies injuries from the fall but notes some R shoulder soreness  Reports she was unable to get up after falling and had to crawl to a tree so she could use the tree to help her stand up  Objective: See treatment diary below      Assessment: Tolerated treatment well  Session focused on strengthening based exercises and provided handouts for HEP with patient verbalizing and demonstrating understanding  Challenged with tandem walking and walking with head turns requiring gait belt and CGA for safety  No significant LOB during session  Patient demonstrated appropriate level of challenge and muscular fatigue throughout session and noted good status at end of visit  Patient demonstrated fatigue post treatment, exhibited good technique with therapeutic exercises and would benefit from continued PT  Plan: Continue per plan of care  Progress treatment as tolerated         Precautions: Fall Risk, Arthritis, Depression, HLD, HTN, Osteoporosis, bilateral knee pain, history of LBP     *indicates included in HEP  HEP code: [facility]                 Neuro Re-Ed             NBOS balance             Standing with head turns  nv           Tandem balance  nv           SL balance             Tandem walking nv 2 laps w/ HHAx1           Walking with horizontal  head turns nv 1 lap           Walking with vertical  head turns  1 lap                        Patient education done                         Ther Exercise             Bike  nv 5' lvl 1>0           Bridges*  nv 3x10           Clamshells* nv 3x10 ea           Heel raises* nv 3x10           Toe raises* nv 3x10                        Ther Activity             Sit to stand*  3x10 w/ foam boost & OTB around knees           Forward step ups             Lateral step ups

## 2022-05-23 ENCOUNTER — OFFICE VISIT (OUTPATIENT)
Dept: PHYSICAL THERAPY | Facility: REHABILITATION | Age: 77
End: 2022-05-23
Payer: MEDICARE

## 2022-05-23 DIAGNOSIS — R26.9 GAIT ABNORMALITY: Primary | ICD-10-CM

## 2022-05-23 PROCEDURE — 97112 NEUROMUSCULAR REEDUCATION: CPT | Performed by: PHYSICAL THERAPIST

## 2022-05-23 NOTE — PROGRESS NOTES
Daily Note     Today's date: 2022  Patient name: Yosef Samson  : 1945  MRN: [id number]  Referring provider: Óscar Bonner MD  Dx:   Encounter Diagnosis     ICD-10-CM    1  Gait abnormality  R26 9        Start Time: 65  Stop Time: 328  Total time in clinic (min): 46 minutes    Subjective: Shannan Rouse denies any falls since previous visit  States her L knee is bothersome today  Reports good compliance with HEP  Objective: See treatment diary below      Assessment: Tolerated treatment well  Session focused static/dynamic balance this session with good tolerance  No significant LOB demonstrated throughout session but CGA required for all balance activities for safety  Noted increased ankle strategy for NBOS balance on foam with EC  Patient demonstrated appropriate level of challenge and muscular fatigue throughout session and noted good status at end of visit  Patient demonstrated fatigue post treatment, exhibited good technique with therapeutic exercises and would benefit from continued PT  Plan: Continue per plan of care  Progress treatment as tolerated         Precautions: Fall Risk, Arthritis, Depression, HLD, HTN, Osteoporosis, bilateral knee pain, history of LBP     *indicates included in HEP  HEP code: [facility]                Neuro Re-Ed             NBOS balance   Foam EO :30x1, foam EC :30x3          Semi-tandem balance   Foam EO :30x2 ea          Standing with horizontal  head turns  nv :30x3          Standing with vertical head turns   :30x3          Tandem balance  nv :30x3 ea          SL balance             Tandem walking nv 2 laps w/ HHAx1 3 laps w/ HHA          Walking with horizontal  head turns nv 1 lap 2 laps          Walking with vertical  head turns  1 lap 2 laps          Walking with marches             Backwards walking             Walking on uneven surface             Obstacle course             [employer] Patient education done                         Ther Exercise             Bike  nv 5' lvl 1>0 5' lvl 0          Bridges*  nv 3x10           Clamshells* nv 3x10 ea           Heel raises* nv 3x10           Toe raises* nv 3x10                        Ther Activity             Sit to stand*  3x10 w/ foam boost & OTB around knees nv          Forward step ups             Lateral step ups

## 2022-05-24 ENCOUNTER — TELEPHONE (OUTPATIENT)
Dept: GASTROENTEROLOGY | Facility: CLINIC | Age: 77
End: 2022-05-24

## 2022-05-24 NOTE — TELEPHONE ENCOUNTER
Patients GI provider:  Dr Luis Weir    Number to return call: [phone]    Reason for call: Pt calling returning results call      Scheduled procedure/appointment date if applicable: N/A

## 2022-05-26 ENCOUNTER — OFFICE VISIT (OUTPATIENT)
Dept: PHYSICAL THERAPY | Facility: REHABILITATION | Age: 77
End: 2022-05-26
Payer: MEDICARE

## 2022-05-26 DIAGNOSIS — R26.9 GAIT ABNORMALITY: Primary | ICD-10-CM

## 2022-05-26 PROCEDURE — 97112 NEUROMUSCULAR REEDUCATION: CPT | Performed by: PHYSICAL THERAPIST

## 2022-05-26 NOTE — PROGRESS NOTES
Daily Note     Today's date: 2022  Patient name: Fred Mueller  : 1945  MRN: [id number]  Referring provider: Sonja Boateng MD  Dx:   Encounter Diagnosis     ICD-10-CM    1  Gait abnormality  R26 9        Start Time:   Stop Time:   Total time in clinic (min): 45 minutes    Subjective: Nelda Mesa reports her back is feeling better since the procedure she had done on Tuesday  Denies any recent falls  Objective: See treatment diary below      Assessment: Tolerated treatment well  Challenged with balance activities outdoor due to addition of uneven surface but no significant LOB demonstrated  Gait belt utilized for safety  Patient demonstrated appropriate level of challenge and muscular fatigue throughout session and noted good status at end of visit  Patient demonstrated fatigue post treatment, exhibited good technique with therapeutic exercises and would benefit from continued PT  Plan: Continue per plan of care        Precautions: Fall Risk, Arthritis, Depression, HLD, HTN, Osteoporosis, bilateral knee pain, history of LBP     *indicates included in HEP  HEP code: [facility]             Neuro Re-Ed           NBOS balance   Foam EO :30x1, foam EC :30x3        Semi-tandem balance   Foam EO :30x2 ea        Standing with horizontal  head turns  nv :30x3        Standing with vertical head turns   :30x3        Tandem balance  nv :30x3 ea        SL balance           Tandem walking nv 2 laps w/ HHAx1 3 laps w/ HHA 2 laps w/ HHA       Walking with horizontal  head turns nv 1 lap 2 laps 2 laps outdoors       Walking with vertical  head turns  1 lap 2 laps 2 laps outdoors       Walking with marches    2 laps indoors       Backwards walking    2 laps outdoors       Walking on uneven surface    1 lap in grass       Obstacle course           Rocker board                                            Patient education done                     Ther Exercise           Bike  nv 5' lvl 1>0 5' lvl 0 5' lvl 1       Bridges*  nv 3x10         Clamshells* nv 3x10 ea         Heel raises* nv 3x10         Toe raises* nv 3x10                    Ther Activity           Sit to stand*  3x10 w/ foam boost & OTB around knees nv        Forward step ups           Lateral step ups

## 2022-05-31 ENCOUNTER — OFFICE VISIT (OUTPATIENT)
Dept: PHYSICAL THERAPY | Facility: REHABILITATION | Age: 77
End: 2022-05-31
Payer: MEDICARE

## 2022-05-31 DIAGNOSIS — R26.9 GAIT ABNORMALITY: Primary | ICD-10-CM

## 2022-05-31 PROCEDURE — 97112 NEUROMUSCULAR REEDUCATION: CPT | Performed by: PHYSICAL THERAPIST

## 2022-05-31 NOTE — PROGRESS NOTES
Daily Note     Today's date: 2022  Patient name: Bard Urban  : 1945  MRN: [id number]  Referring provider: Leonela Cook MD  Dx:   Encounter Diagnosis     ICD-10-CM    1  Gait abnormality  R26 9        Start Time: 691  Stop Time:   Total time in clinic (min): 45 minutes    Subjective: Magdalena Rodriges reports she's doing well since previous visit; no recent falls  Objective: See treatment diary below      Assessment: Tolerated treatment well  Challenged with lateral>forward hurdles this session, especially with higher hurdles  No significant LOB but gait belt and CGA at minimum utilized for safety  Patient demonstrated appropriate level of challenge and muscular fatigue throughout session and noted good status at end of visit  Patient demonstrated fatigue post treatment, exhibited good technique with therapeutic exercises and would benefit from continued PT  Plan: Continue per plan of care  Progress treatment as tolerated         Precautions: Fall Risk, Arthritis, Depression, HLD, HTN, Osteoporosis, bilateral knee pain, history of LBP     *indicates included in HEP  HEP code: [facility]            Neuro Re-Ed           NBOS balance   Foam EO :30x1, foam EC :30x3        Semi-tandem balance   Foam EO :30x2 ea        Standing with horizontal  head turns  nv :30x3  :30x3      Standing with vertical head turns   :30x3  :30x3      Tandem balance  nv :30x3 ea  :30x2 ea      SL balance           Tandem walking nv 2 laps w/ HHAx1 3 laps w/ HHA 2 laps w/ HHA 2 laps w/ HHA      Walking with horizontal  head turns nv 1 lap 2 laps 2 laps outdoors 2 laps       Walking with vertical  head turns  1 lap 2 laps 2 laps outdoors 2 laps      Walking with marches    2 laps indoors       Backwards walking    2 laps outdoors 2 laps      Walking on uneven surface    1 lap in grass       Hurdles - fwd     3 laps, 5 hurdles varying heights      Hurdles - lateral     3 laps, 5 hurdles varying heights      Obstacle course           Rocker board                                            Patient education done                     Ther Exercise           Bike  nv 5' lvl 1>0 5' lvl 0 5' lvl 1 5' lvl 1      Bridges*  nv 3x10         Clamshells* nv 3x10 ea         Heel raises* nv 3x10         Toe raises* nv 3x10                    Ther Activity           Sit to stand*  3x10 w/ foam boost & OTB around knees nv        Forward step ups           Lateral step ups

## 2022-06-02 ENCOUNTER — APPOINTMENT (OUTPATIENT)
Dept: PHYSICAL THERAPY | Facility: REHABILITATION | Age: 77
End: 2022-06-02
Payer: MEDICARE

## 2022-06-06 ENCOUNTER — APPOINTMENT (OUTPATIENT)
Dept: PHYSICAL THERAPY | Facility: REHABILITATION | Age: 77
End: 2022-06-06
Payer: MEDICARE

## 2022-06-09 ENCOUNTER — OFFICE VISIT (OUTPATIENT)
Dept: PHYSICAL THERAPY | Facility: REHABILITATION | Age: 77
End: 2022-06-09
Payer: MEDICARE

## 2022-06-09 DIAGNOSIS — R26.9 GAIT ABNORMALITY: Primary | ICD-10-CM

## 2022-06-09 PROCEDURE — 97112 NEUROMUSCULAR REEDUCATION: CPT | Performed by: PHYSICAL THERAPIST

## 2022-06-09 PROCEDURE — 97110 THERAPEUTIC EXERCISES: CPT | Performed by: PHYSICAL THERAPIST

## 2022-06-09 NOTE — PROGRESS NOTES
Daily Note     Today's date: 2022  Patient name: Bard Urban  : 1945  MRN: [id number]  Referring provider: Leonela Cook MD  Dx:   Encounter Diagnosis     ICD-10-CM    1  Gait abnormality  R26 9        Start Time: 1304  Stop Time: 3330  Total time in clinic (min): 50 minutes    Subjective: Magdalena Rodriges states that she felt a little unsteady this weekend but denies any falls or near falls  Objective: See treatment diary below      Assessment: Tolerated treatment well  Updated and reviewed HEP with patient; patient verbalized and demonstrated understanding of all exercises  Patient demonstrated fatigue post treatment, exhibited good technique with therapeutic exercises and would benefit from continued PT  Magdalena Rodriges had occasional incidences of minor loss of balance; patient was able to self correct without therapist assist  Patient compensated by swinging her right leg around the amarilys during stepovers but once cued to step over she corrected it and maintained it throughout the rest of the exercise  Noted decreased R foot clearance during swing phase of gait  Patient demonstrated appropriate level of challenge and muscular fatigue throughout session and noted good status at end of visit  Plan: Continue per plan of care  Progress treatment as tolerated         Precautions: Fall Risk, Arthritis, Depression, HLD, HTN, Osteoporosis, bilateral knee pain, history of LBP     *indicates included in HEP  HEP code: [facility]           Neuro Re-Ed           NBOS balance   Foam EO :30x1, foam EC :30x3        Semi-tandem balance   Foam EO :30x2 ea        Standing with horizontal  head turns  nv :30x3  :30x3 :30x3     Standing with vertical head turns   :30x3  :30x3 :30x3     Tandem balance  nv :30x3 ea  :30x2 ea      SL balance           Tandem walking nv 2 laps w/ HHAx1 3 laps w/ HHA 2 laps w/ HHA 2 laps w/ HHA nv     Walking with horizontal  head turns nv 1 lap 2 laps 2 laps outdoors 2 laps  2 laps      Walking with vertical  head turns  1 lap 2 laps 2 laps outdoors 2 laps 2 laps      Walking with marches    2 laps indoors       Backwards walking    2 laps outdoors 2 laps 2 laps outdoors     Walking on uneven surface    1 lap in grass       Hurdles - fwd     3 laps, 5 hurdles varying heights 6 hurdles 5 laps outdoors     Hurdles - lateral     3 laps, 5 hurdles varying heights 6 hurdles 4 laps outdoors     Obstacle course           Rocker board                                            Patient education done                     Ther Exercise           Bike  nv 5' lvl 1>0 5' lvl 0 5' lvl 1 5' lvl 1 5' lvl 1     Bridges*  nv 3x10         Clamshells* nv 3x10 ea         Heel raises* nv 3x10         Toe raises* nv 3x10         Standing hip extension*      2x10 leaning on table     Standing hip abduction*      2x10 leaning on table                Ther Activity           Sit to stand*  3x10 w/ foam boost & OTB around knees nv        Forward step ups           Lateral step ups

## 2022-06-13 ENCOUNTER — OFFICE VISIT (OUTPATIENT)
Dept: PHYSICAL THERAPY | Facility: REHABILITATION | Age: 77
End: 2022-06-13
Payer: MEDICARE

## 2022-06-13 DIAGNOSIS — R26.9 GAIT ABNORMALITY: Primary | ICD-10-CM

## 2022-06-13 PROCEDURE — 97112 NEUROMUSCULAR REEDUCATION: CPT | Performed by: PHYSICAL THERAPIST

## 2022-06-13 NOTE — PROGRESS NOTES
Daily Note     Today's date: 2022  Patient name: Wolfgang Hernandez  : 1945  MRN: [id number]  Referring provider: Claudio Pryor MD  Dx:   Encounter Diagnosis     ICD-10-CM    1  Gait abnormality  R26 9        Start Time:   Stop Time:   Total time in clinic (min): 45 minutes    Subjective: [facility] reported feeling unsteady this weekend while working outside; specifically when stepping over objects  Objective: See treatment diary below      Assessment: Tolerated treatment well  Patient demonstrated fatigue post treatment, exhibited good technique with therapeutic exercises and would benefit from continued PT   [facility] continues to have difficulty with tandem walking and requires minimal assist from therapist  Introduced SL balance; patient required mild assist from the counter indicating impaired static balance impacting her ability to step over objects  Patient experienced more difficulty when SL balancing on her L LE  Introduced an obstacle course for 3 laps and patient improved in steadiness with each lap  [facility] reported that the most difficult obstacle was balancing on the half foam roll; by lap 3 she was able to do it with little cueing from therapist  Introduced the biodex for improved weight shifting towards end ranges; noted challenge but improved with reps  Patient demonstrated appropriate level of challenge and muscular fatigue throughout session and noted good status at end of visit  Plan: Continue per plan of care  Progress treatment as tolerated         Precautions: Fall Risk, Arthritis, Depression, HLD, HTN, Osteoporosis, bilateral knee pain, history of LBP     *indicates included in HEP  HEP code: [facility]          Neuro Re-Ed           NBOS balance   Foam EO :30x1, foam EC :30x3        Semi-tandem balance   Foam EO :30x2 ea        Standing with horizontal  head turns  nv :30x3  :30x3 :30x3 30 x 3 on foam    Standing with vertical head turns :30x3  :30x3 :30x3 :30x3 on foam    Tandem balance  nv :30x3 ea  :30x2 ea      SL balance       :10x5 ea    Tandem walking nv 2 laps w/ HHAx1 3 laps w/ HHA 2 laps w/ HHA 2 laps w/ HHA nv 3 laps w/ HHA    Walking with horizontal  head turns nv 1 lap 2 laps 2 laps outdoors 2 laps  2 laps  2 laps    Walking with vertical  head turns  1 lap 2 laps 2 laps outdoors 2 laps 2 laps  2 laps    Walking with marches    2 laps indoors       Backwards walking    2 laps outdoors 2 laps 2 laps outdoors 3 laps indoors    Walking on uneven surface    1 lap in grass       Hurdles - fwd     3 laps, 5 hurdles varying heights 6 hurdles 5 laps outdoors     Hurdles - lateral     3 laps, 5 hurdles varying heights 6 hurdles 4 laps outdoors     Obstacle course       3 laps - hurdles, foam, foam balance beam, foam roller flat side down    Rocker board           Biodex LOS       5x                          Patient education done                     Ther Exercise           Bike  nv 5' lvl 1>0 5' lvl 0 5' lvl 1 5' lvl 1 5' lvl 1 5' lvl 1    Bridges*  nv 3x10         Clamshells* nv 3x10 ea         Heel raises* nv 3x10         Toe raises* nv 3x10         Standing hip extension*      2x10 leaning on table     Standing hip abduction*      2x10 leaning on table                Ther Activity           Sit to stand*  3x10 w/ foam boost & OTB around knees nv        Forward step ups           Lateral step ups

## 2022-06-14 ENCOUNTER — OFFICE VISIT (OUTPATIENT)
Dept: PHYSICAL THERAPY | Facility: REHABILITATION | Age: 77
End: 2022-06-14
Payer: MEDICARE

## 2022-06-14 DIAGNOSIS — R26.9 GAIT ABNORMALITY: Primary | ICD-10-CM

## 2022-06-14 PROCEDURE — 97112 NEUROMUSCULAR REEDUCATION: CPT | Performed by: PHYSICAL THERAPIST

## 2022-06-14 NOTE — PROGRESS NOTES
Daily Note     Today's date: 2022  Patient name: Carlyn Arzate  : 1945  MRN: [id number]  Referring provider: Luann Del Rio MD  Dx:   Encounter Diagnosis     ICD-10-CM    1  Gait abnormality  R26 9        Start Time: 1400  Stop Time: 3200  Total time in clinic (min): 45 minutes    Subjective: Joseph Carmona reports no significant unsteadiness since last visit  Objective: See treatment diary below      Assessment: Tolerated treatment well  Patient demonstrated fatigue post treatment, exhibited good technique with therapeutic exercises and would benefit from continued PT   Increased obstacle course; patient adjusted well and improved with each lap  Joseph Carmona required minimal HHA throughout obstacle course  Patient has difficulty holding a conversation while walking; complains of feeling "unsteady"  Otherwise Joseph Carmona is improving with each exercise and shows improvements in balance  Introduced diagonal head turns with walking and patient did well  Noted a dull ache in lower left back when stepping backward with her L LE; subsided with rest break  Plan: Continue per plan of care  Progress treatment as tolerated         Precautions: Fall Risk, Arthritis, Depression, HLD, HTN, Osteoporosis, bilateral knee pain, history of LBP     *indicates included in HEP  HEP code: [facility]         Neuro Re-Ed           NBOS balance   Foam EO :30x1, foam EC :30x3        Semi-tandem balance   Foam EO :30x2 ea        Standing with horizontal  head turns  nv :30x3  :30x3 :30x3 30 x 3 on foam    Standing with vertical head turns   :30x3  :30x3 :30x3 :30x3 on foam    Tandem balance  nv :30x3 ea  :30x2 ea      SL balance       :10x5 ea :20x5 ea   Tandem walking nv 2 laps w/ HHAx1 3 laps w/ HHA 2 laps w/ HHA 2 laps w/ HHA nv 3 laps w/ HHA 4 laps w/ HHA   Walking with horizontal  head turns nv 1 lap 2 laps 2 laps outdoors 2 laps  2 laps  2 laps 2 laps   Walking with vertical head turns  1 lap 2 laps 2 laps outdoors 2 laps 2 laps  2 laps 2 laps   Walking with diagonal head turns        1 lap ea   Walking with marches    2 laps indoors       Backwards walking    2 laps outdoors 2 laps 2 laps outdoors 3 laps indoors 4 laps indoors   Walking on uneven surface    1 lap in grass       Hurdles - fwd     3 laps, 5 hurdles varying heights 6 hurdles 5 laps outdoors     Hurdles - lateral     3 laps, 5 hurdles varying heights 6 hurdles 4 laps outdoors     Obstacle course       3 laps - hurdles, foam, foam balance beam, foam roller flat side down 3 laps - hurdles, foam, 8" step, foam balance beam, rocker board, wedge, foam roller flat side down   Rocker board           Biodex LOS       5x 5x                         Patient education done                     Ther Exercise           Bike  nv 5' lvl 1>0 5' lvl 0 5' lvl 1 5' lvl 1 5' lvl 1 5' lvl 1 5' lvl 1   Bridges*  nv 3x10         Clamshells* nv 3x10 ea         Heel raises* nv 3x10         Toe raises* nv 3x10         Standing hip extension*      2x10 leaning on table     Standing hip abduction*      2x10 leaning on table                Ther Activity           Sit to stand*  3x10 w/ foam boost & OTB around knees nv        Forward step ups           Lateral step ups

## 2022-06-16 ENCOUNTER — APPOINTMENT (OUTPATIENT)
Dept: PHYSICAL THERAPY | Facility: REHABILITATION | Age: 77
End: 2022-06-16
Payer: MEDICARE

## 2022-06-20 ENCOUNTER — APPOINTMENT (OUTPATIENT)
Dept: PHYSICAL THERAPY | Facility: REHABILITATION | Age: 77
End: 2022-06-20
Payer: MEDICARE

## 2022-06-21 ENCOUNTER — OFFICE VISIT (OUTPATIENT)
Dept: PHYSICAL THERAPY | Facility: REHABILITATION | Age: 77
End: 2022-06-21
Payer: MEDICARE

## 2022-06-21 DIAGNOSIS — R26.9 GAIT ABNORMALITY: Primary | ICD-10-CM

## 2022-06-21 PROCEDURE — 97112 NEUROMUSCULAR REEDUCATION: CPT | Performed by: PHYSICAL THERAPIST

## 2022-06-21 PROCEDURE — 97110 THERAPEUTIC EXERCISES: CPT | Performed by: PHYSICAL THERAPIST

## 2022-06-21 NOTE — PROGRESS NOTES
Daily Note     Today's date: 2022  Patient name: Gabbi Zapata  : 1945  MRN: [id number]  Referring provider: Katie Crabtree MD  Dx:   Encounter Diagnosis     ICD-10-CM    1  Gait abnormality  R26 9        Start Time:   Stop Time:   Total time in clinic (min): 45 minutes    Subjective: Dawson Machado reports that she is feeling woozy following a migraine she experienced yesterday  Admits to feeling less steady today  Therapist explained that we will do what she is up for today whether it be less reps or easier exercises  Dawson Machado admits that she had a misstep while at home alone and was able to catch herself and self correct which surprised her  Objective: See treatment diary below      Assessment: Tolerated treatment well  Dawson Machado reported feeling unsteady and wanting to do an exercise that she can hold on to something for following head turn walking therefore we reintroduced standing hip ext and abduction leaning on a table for improved hip girdle strength  Dawson Machado had decreased speed during head turn walks potentially due to the fact that she was not feeling well  We continued with tandem walking and Dawson Machado did well; required cueing to keep feet in line because they were deviating to the sides about an inch  Dawson Machado was able to maintain proper form for the rest of the exercise  Dawson Machado had decreased performance on the biodex today compared to previous visits and required cueing for slow steady movements; potentially due to fatigue  Patient demonstrated appropriate level of challenge and muscular fatigue throughout session and noted good status at end of visit  Patient demonstrated fatigue post treatment, exhibited good technique with therapeutic exercises and would benefit from continued PT    Patient treated by ZHANE Bowers under direct PT supervision  Plan: Continue per plan of care  Progress treatment as tolerated         Precautions: Fall Risk, Arthritis, Depression, HLD, HTN, Osteoporosis, bilateral knee pain, history of LBP     *indicates included in HEP  HEP code: [facility]      6/21 5/23 5/26 5/31 6/9 6/13 6/14   Neuro Re-Ed           NBOS balance   Foam EO :30x1, foam EC :30x3        Semi-tandem balance   Foam EO :30x2 ea        Standing with horizontal  head turns   :30x3  :30x3 :30x3 30 x 3 on foam    Standing with vertical head turns   :30x3  :30x3 :30x3 :30x3 on foam    Tandem balance   :30x3 ea  :30x2 ea      SL balance       :10x5 ea :20x5 ea   Tandem walking 3 laps   3 laps w/ HHA 2 laps w/ HHA 2 laps w/ HHA nv 3 laps w/ HHA 4 laps w/ HHA   Walking with horizontal  head turns 2 laps  2 laps 2 laps outdoors 2 laps  2 laps  2 laps 2 laps   Walking with vertical head turns 2 laps  2 laps 2 laps outdoors 2 laps 2 laps  2 laps 2 laps   Walking with diagonal head turns 1 lap ea       1 lap ea   Walking with marches 1 lap HHA  2 laps no HHA   2 laps indoors       Backwards walking    2 laps outdoors 2 laps 2 laps outdoors 3 laps indoors 4 laps indoors   Walking on uneven surface    1 lap in grass       Hurdles - fwd     3 laps, 5 hurdles varying heights 6 hurdles 5 laps outdoors     Hurdles - lateral     3 laps, 5 hurdles varying heights 6 hurdles 4 laps outdoors     Obstacle course       3 laps - hurdles, foam, foam balance beam, foam roller flat side down 3 laps - hurdles, foam, 8" step, foam balance beam, rocker board, wedge, foam roller flat side down   Rocker board           Biodex LOS 5x      5x 5x                         Patient education                      Ther Exercise           Bike  5' lvl 0  5' lvl 0 5' lvl 1 5' lvl 1 5' lvl 1 5' lvl 1 5' lvl 1   Bridges*            Clamshells*           Heel raises*           Toe raises*           Standing hip extension* 3x10 leaning on table     2x10 leaning on table     Standing hip abduction* 2x10 leaning on table     2x10 leaning on table                Ther Activity           Sit to stand*   nv        Forward step ups Lateral step ups

## 2022-06-23 ENCOUNTER — APPOINTMENT (OUTPATIENT)
Dept: PHYSICAL THERAPY | Facility: REHABILITATION | Age: 77
End: 2022-06-23
Payer: MEDICARE

## 2022-06-27 ENCOUNTER — EVALUATION (OUTPATIENT)
Dept: PHYSICAL THERAPY | Facility: REHABILITATION | Age: 77
End: 2022-06-27
Payer: MEDICARE

## 2022-06-27 DIAGNOSIS — R26.9 GAIT ABNORMALITY: Primary | ICD-10-CM

## 2022-06-27 PROCEDURE — 97112 NEUROMUSCULAR REEDUCATION: CPT | Performed by: PHYSICAL THERAPIST

## 2022-06-27 NOTE — PROGRESS NOTES
PT Re-Evaluation     Today's date: 2022  Patient name: Maxi Agarwal  : 1945  MRN: [id number]  Referring provider: Sariah Peralta MD  Dx:   Encounter Diagnosis     ICD-10-CM    1  Gait abnormality  R26 9        Start Time:   Stop Time: 136  Total time in clinic (min): 48 minutes    Assessment  Assessment details: Per patient report and clinical findings, Maxi Agarwal has made good progress since starting skilled physical therapy services  Maxi Agarwal has been compliant with PT POC and HEP since initial evaluation  To this date, Maxi Agarwal has completed 10 visits of skilled physical therapy  Maxi Agarwal demonstrates improvements in objective data, most notably with FGA score however, continues to be limited compared to her prior level of function  Remaining deficits include slight LE strength deficits bilaterally and decreased static/dynamic balance which continue to put Eduard at an increased risk for falls  Recommend continued skilled physical therapy services to address remaining deficits to promote progress towards her prior level of function  Impairments: abnormal gait, activity intolerance, impaired balance, impaired physical strength and lacks appropriate home exercise program  Understanding of Dx/Px/POC: good   Prognosis: good    Goals  STG (4 weeks):  1  Increase FGA score to at least 18 to indicate improving dynamic balance  - met  2  Increase 30" sit to stand to at least 7 reps to progress towards age normative values  - met    LTG (8 weeks):  1  Able to ambulate on uneven surfaces confidently without fear of losing balance  - progressing  2  Increase FGA score to at least 22 to indicate decreased risk for falls  - progressing  3  Able to ascend/descend 1 flight of stairs without compensation to promote improved household and community ambulation  - progressing  4  Independent with HEP  - progressing    Plan  Plan details:  Thank you for referring Maxi Agarwal to Physical Therapy at [place] [address] and for the opportunity to coordinate care  Patient would benefit from: skilled physical therapy  Planned therapy interventions: abdominal trunk stabilization, activity modification, ADL retraining, balance, balance/weight bearing training, behavior modification, body mechanics training, breathing training, fine motor coordination training, flexibility, functional ROM exercises, gait training, graded activity, graded exercise, graded motor, home exercise program, work reintegration, transfer training, therapeutic training, therapeutic exercise, therapeutic activities, stretching, strengthening, self care, postural training, patient education, neuromuscular re-education, muscle pump exercises, motor coordination training, Rizzo taping, manual therapy, joint mobilization and IADL retraining  Frequency: 2x week  Duration in visits: 10  Plan of Care beginning date: 6/27/2022  Treatment plan discussed with: patient        Subjective Evaluation    History of Present Illness  Mechanism of injury:   06/27/22:  Kael Conrad reports feeling she has made 40% progress since IE  Maycol Barber reports improvements in ability to self correct her balance when she starts to lose her balance  Reports she feels she is paying more attention when she's walking which has helped decrease the amount of times she loses her balance  Reports on good days she's able to ascend/descend stairs reciprocally however, does continue to ascend/descend non-reciprocally on bad days and will sometimes crawl up a portion of her stairs since she doesn't have a railing to use  Maycol Barber reports continued difficulty with stepping over objects or lifting her leg up high  States she went to step over something on Saturday when she was moving furniture and she stumbled backwards into the door       05/16/22:  Kael Conrad presents to skilled physical therapy with complaints of difficulty walking when walking outdoors and feels off balance  Reports she has fallen twice, most recent being 4 weeks ago; states she caught herself on her elbows  Patient reports she started to notice decreased balance over the past two months  Since onset, symptoms have stayed the same  Currently, Arcadio Kumari is having difficulty with walking on uneven surfaces and standing  Patient denies numbness, tingling in right leg, left leg, right foot and left foot  Next follow up appointment with physician is schedule in 2022      Pain  No pain reported    Social Support  Steps to enter house: yes  1  Stairs in house: yes   Lives in: multiple-level home  Lives with: alone    Employment status: working  Treatments  Current treatment: physical therapy  Patient Goals  Patient goals for therapy: improved balance          Objective     Strength/Myotome Testing     Left Hip   Planes of Motion   Flexion: 4+  Abduction: 5  Adduction: 5  External rotation: 4+ (L knee tenderness)  Internal rotation: 5    Right Hip   Planes of Motion   Flexion: 5  Abduction: 5  Adduction: 5  External rotation: 5  Internal rotation: 5    Left Knee   Flexion: 5  Extension: 4+    Right Knee   Flexion: 5  Extension: 4+    Left Ankle/Foot   Dorsiflexion: 5  Plantar flexion: 5    Right Ankle/Foot   Dorsiflexion: 5  Plantar flexion: 5  Neuro Exam:     Functional outcomes   Functional outcome comment:   22  30" sit to stand: 7 (reported bilateral knee discomfort)  TUG: 10 seconds, no AD  Gait: minimal reciprocal arm swing, minimal trunk rotation  FGA: 21/30 with score of 22/30 or less indicative of increased fall risk        22:  30" sit to stand: 5 (reported bilateral knee discomfort)  TU seconds, no AD  mCTSIB:     Phase 1: 60"     Phase 2: 60"     Phase 3: 60"     Phase 4: 60"  Gait: no reciprocal arm swing, minimal trunk rotation  FGA: 15/30 with score of 22/30 or less indicative of increased fall risk                 Precautions: Fall Risk, Arthritis, Depression, HLD, HTN, Osteoporosis, bilateral knee pain, history of LBP     *indicates included in HEP  HEP code: [facility]      6/21 6/27 5/26 5/31 6/9 6/13 6/14   Neuro Re-Ed           NBOS balance           Semi-tandem balance           Standing with horizontal  head turns     :30x3 :30x3 30 x 3 on foam    Standing with vertical head turns     :30x3 :30x3 :30x3 on foam    Tandem balance     :30x2 ea      SL balance       :10x5 ea :20x5 ea   Tandem walking 3 laps  2 laps, 1 w/ and 1  w/out HHA  2 laps w/ HHA 2 laps w/ HHA nv 3 laps w/ HHA 4 laps w/ HHA   Walking with horizontal  head turns 2 laps 2 laps  2 laps outdoors 2 laps  2 laps  2 laps 2 laps   Walking with vertical head turns 2 laps 2 laps  2 laps outdoors 2 laps 2 laps  2 laps 2 laps   Walking with diagonal head turns 1 lap ea       1 lap ea   Walking with marches 1 lap HHA  2 laps no HHA 3 laps  2 laps indoors       Backwards walking  EC fwd, EO bwd 1 lap  2 laps outdoors 2 laps 2 laps outdoors 3 laps indoors 4 laps indoors   Walking on uneven surface    1 lap in grass       Hurdles - fwd     3 laps, 5 hurdles varying heights 6 hurdles 5 laps outdoors     Hurdles - lateral     3 laps, 5 hurdles varying heights 6 hurdles 4 laps outdoors     Obstacle course       3 laps - hurdles, foam, foam balance beam, foam roller flat side down 3 laps - hurdles, foam, 8" step, foam balance beam, rocker board, wedge, foam roller flat side down   Rocker board           Biodex LOS 5x      5x 5x                         Patient education                      Ther Exercise           Bike  5' lvl 0 5' lvl 0  5' lvl 1 5' lvl 1 5' lvl 1 5' lvl 1 5' lvl 1   Bridges*            Clamshells*           Heel raises*           Toe raises*           Standing hip extension* 3x10 leaning on table     2x10 leaning on table     Standing hip abduction* 2x10 leaning on table     2x10 leaning on table                Ther Activity           Sit to stand*           Forward step ups           Lateral step ups

## 2022-06-28 ENCOUNTER — OFFICE VISIT (OUTPATIENT)
Dept: PHYSICAL THERAPY | Facility: REHABILITATION | Age: 77
End: 2022-06-28
Payer: MEDICARE

## 2022-06-28 DIAGNOSIS — R26.9 GAIT ABNORMALITY: Primary | ICD-10-CM

## 2022-06-28 PROCEDURE — 97112 NEUROMUSCULAR REEDUCATION: CPT | Performed by: PHYSICAL THERAPIST

## 2022-06-28 NOTE — PROGRESS NOTES
Daily Note     Today's date: 2022  Patient name: Yehuda Leyva  : 1945  MRN: [id number]  Referring provider: Michelle Lucas MD  Dx:   Encounter Diagnosis     ICD-10-CM    1  Gait abnormality  R26 9        Start Time: 1400  Stop Time: 5632  Total time in clinic (min): 43 minutes    Subjective: [employer] reports she doing well at start of session  No significant changes since yesterday's session  Objective: See treatment diary below      Assessment: Tolerated treatment well  Challenged with progression of tandem walking to forward and backward but minimal reliance on hand held support to maintain balance  Introduced [employer] game for improved weight shifting; demonstrated increased difficulty with plate unlocked and shifting weight posteriorly  Patient demonstrated appropriate level of challenge and muscular fatigue throughout session and noted good status at end of visit  Patient demonstrated fatigue post treatment, exhibited good technique with therapeutic exercises and would benefit from continued PT  Plan: Continue per plan of care  Progress treatment as tolerated         Precautions: Fall Risk, Arthritis, Depression, HLD, HTN, Osteoporosis, bilateral knee pain, history of LBP     *indicates included in HEP  HEP code: [facility]         Neuro Re-Ed           NBOS balance           Semi-tandem balance           Standing with horizontal  head turns     :30x3 :30x3 30 x 3 on foam    Standing with vertical head turns     :30x3 :30x3 :30x3 on foam    Tandem balance     :30x2 ea      SL balance       :10x5 ea :20x5 ea   Tandem walking 3 laps  2 laps, 1 w/ and 1  w/out HHA 3 laps fwd/bwd w/ HHA  2 laps w/ HHA nv 3 laps w/ HHA 4 laps w/ HHA   Walking with horizontal  head turns 2 laps 2 laps 2 laps  2 laps  2 laps  2 laps 2 laps   Walking with vertical head turns 2 laps 2 laps 2 laps  2 laps 2 laps  2 laps 2 laps   Walking with diagonal head turns 1 lap ea  2 laps ea     1 lap ea   Walking with marches 1 lap HHA  2 laps no HHA 3 laps 3 laps        Backwards walking  EC fwd, EO bwd 1 lap   2 laps 2 laps outdoors 3 laps indoors 4 laps indoors   Walking on uneven surface           Hurdles - fwd     3 laps, 5 hurdles varying heights 6 hurdles 5 laps outdoors     Hurdles - lateral     3 laps, 5 hurdles varying heights 6 hurdles 4 laps outdoors     Obstacle course       3 laps - hurdles, foam, foam balance beam, foam roller flat side down 3 laps - hurdles, foam, 8" step, foam balance beam, rocker board, wedge, foam roller flat side down   Rocker board           Biodex LOS 5x      5x 5x   Biodex catch game   8' - plate  locked and unlocked                   Patient education                      Ther Exercise           Bike  5' lvl 0 5' lvl 0 5' lvl 0  5' lvl 1 5' lvl 1 5' lvl 1 5' lvl 1   Bridges*            Clamshells*           Heel raises*           Toe raises*           Standing hip extension* 3x10 leaning on table     2x10 leaning on table     Standing hip abduction* 2x10 leaning on table     2x10 leaning on table                Ther Activity           Sit to stand*           Forward step ups           Lateral step ups

## 2022-06-30 ENCOUNTER — APPOINTMENT (OUTPATIENT)
Dept: PHYSICAL THERAPY | Facility: REHABILITATION | Age: 77
End: 2022-06-30
Payer: MEDICARE

## 2022-07-18 ENCOUNTER — HOSPITAL ENCOUNTER (OUTPATIENT)
Dept: RADIOLOGY | Facility: HOSPITAL | Age: 77
Discharge: HOME | End: 2022-07-18
Payer: MEDICARE

## 2022-07-18 DIAGNOSIS — Z78.0 MENOPAUSE: ICD-10-CM

## 2022-07-18 PROCEDURE — 77080 DXA BONE DENSITY AXIAL: CPT

## 2022-08-02 ENCOUNTER — RA CDI HCC (OUTPATIENT)
Dept: OTHER | Facility: HOSPITAL | Age: 77
End: 2022-08-02

## 2022-08-02 ENCOUNTER — APPOINTMENT (OUTPATIENT)
Dept: PHYSICAL THERAPY | Facility: REHABILITATION | Age: 77
End: 2022-08-02
Payer: MEDICARE

## 2022-08-02 NOTE — PROGRESS NOTES
E85 9  [facility]  coding opportunities          Chart Reviewed number of suggestions sent to Provider: 1     Patients Insurance     Medicare Insurance: [employer]

## 2022-08-04 ENCOUNTER — APPOINTMENT (OUTPATIENT)
Dept: PHYSICAL THERAPY | Facility: REHABILITATION | Age: 77
End: 2022-08-04
Payer: MEDICARE

## 2022-08-04 ENCOUNTER — APPOINTMENT (OUTPATIENT)
Dept: LAB | Facility: CLINIC | Age: 77
End: 2022-08-04
Payer: MEDICARE

## 2022-08-04 DIAGNOSIS — E78.2 MIXED HYPERLIPIDEMIA: ICD-10-CM

## 2022-08-04 DIAGNOSIS — I10 PRIMARY HYPERTENSION: ICD-10-CM

## 2022-08-04 LAB
ALBUMIN SERPL BCP-MCNC: 4 G/DL (ref 3.5–5)
ALP SERPL-CCNC: 36 U/L (ref 46–116)
ALT SERPL W P-5'-P-CCNC: 24 U/L (ref 12–78)
ANION GAP SERPL CALCULATED.3IONS-SCNC: 5 MMOL/L (ref 4–13)
AST SERPL W P-5'-P-CCNC: 15 U/L (ref 5–45)
BACTERIA UR QL AUTO: ABNORMAL /HPF
BASOPHILS # BLD AUTO: 0.03 THOUSANDS/ΜL (ref 0–0.1)
BASOPHILS NFR BLD AUTO: 1 % (ref 0–1)
BILIRUB SERPL-MCNC: 0.77 MG/DL (ref 0.2–1)
BILIRUB UR QL STRIP: NEGATIVE
BUN SERPL-MCNC: 14 MG/DL (ref 5–25)
CALCIUM SERPL-MCNC: 9.8 MG/DL (ref 8.3–10.1)
CHLORIDE SERPL-SCNC: 103 MMOL/L (ref 96–108)
CHOLEST SERPL-MCNC: 141 MG/DL
CLARITY UR: CLEAR
CO2 SERPL-SCNC: 30 MMOL/L (ref 21–32)
COLOR UR: ABNORMAL
CREAT SERPL-MCNC: 0.7 MG/DL (ref 0.6–1.3)
EOSINOPHIL # BLD AUTO: 0.23 THOUSAND/ΜL (ref 0–0.61)
EOSINOPHIL NFR BLD AUTO: 4 % (ref 0–6)
ERYTHROCYTE [DISTWIDTH] IN BLOOD BY AUTOMATED COUNT: 12 % (ref 11.6–15.1)
GFR SERPL CREATININE-BSD FRML MDRD: 85 ML/MIN/1.73SQ M
GLUCOSE P FAST SERPL-MCNC: 94 MG/DL (ref 65–99)
GLUCOSE UR STRIP-MCNC: NEGATIVE MG/DL
HCT VFR BLD AUTO: 37.7 % (ref 34.8–46.1)
HDLC SERPL-MCNC: 50 MG/DL
HGB BLD-MCNC: 12.8 G/DL (ref 11.5–15.4)
HGB UR QL STRIP.AUTO: NEGATIVE
IMM GRANULOCYTES # BLD AUTO: 0.02 THOUSAND/UL (ref 0–0.2)
IMM GRANULOCYTES NFR BLD AUTO: 0 % (ref 0–2)
KETONES UR STRIP-MCNC: NEGATIVE MG/DL
LDLC SERPL CALC-MCNC: 56 MG/DL (ref 0–100)
LEUKOCYTE ESTERASE UR QL STRIP: ABNORMAL
LYMPHOCYTES # BLD AUTO: 1.26 THOUSANDS/ΜL (ref 0.6–4.47)
LYMPHOCYTES NFR BLD AUTO: 23 % (ref 14–44)
MAGNESIUM SERPL-MCNC: 2.1 MG/DL (ref 1.6–2.6)
MCH RBC QN AUTO: 31.4 PG (ref 26.8–34.3)
MCHC RBC AUTO-ENTMCNC: 34 G/DL (ref 31.4–37.4)
MCV RBC AUTO: 92 FL (ref 82–98)
MONOCYTES # BLD AUTO: 0.42 THOUSAND/ΜL (ref 0.17–1.22)
MONOCYTES NFR BLD AUTO: 8 % (ref 4–12)
NEUTROPHILS # BLD AUTO: 3.6 THOUSANDS/ΜL (ref 1.85–7.62)
NEUTS SEG NFR BLD AUTO: 64 % (ref 43–75)
NITRITE UR QL STRIP: NEGATIVE
NON-SQ EPI CELLS URNS QL MICRO: ABNORMAL /HPF
NRBC BLD AUTO-RTO: 0 /100 WBCS
PH UR STRIP.AUTO: 7 [PH]
PLATELET # BLD AUTO: 296 THOUSANDS/UL (ref 149–390)
PMV BLD AUTO: 9.2 FL (ref 8.9–12.7)
POTASSIUM SERPL-SCNC: 3.7 MMOL/L (ref 3.5–5.3)
PROT SERPL-MCNC: 7.1 G/DL (ref 6.4–8.4)
PROT UR STRIP-MCNC: NEGATIVE MG/DL
RBC # BLD AUTO: 4.08 MILLION/UL (ref 3.81–5.12)
RBC #/AREA URNS AUTO: ABNORMAL /HPF
SODIUM SERPL-SCNC: 138 MMOL/L (ref 135–147)
SP GR UR STRIP.AUTO: 1.01 (ref 1–1.03)
T4 FREE SERPL-MCNC: 1.2 NG/DL (ref 0.76–1.46)
TRIGL SERPL-MCNC: 173 MG/DL
TSH SERPL DL<=0.05 MIU/L-ACNC: 1.2 UIU/ML (ref 0.45–4.5)
URATE SERPL-MCNC: 5.1 MG/DL (ref 2–7.5)
UROBILINOGEN UR STRIP-ACNC: <2 MG/DL
WBC # BLD AUTO: 5.56 THOUSAND/UL (ref 4.31–10.16)
WBC #/AREA URNS AUTO: ABNORMAL /HPF

## 2022-08-04 PROCEDURE — 84439 ASSAY OF FREE THYROXINE: CPT

## 2022-08-04 PROCEDURE — 84550 ASSAY OF BLOOD/URIC ACID: CPT

## 2022-08-04 PROCEDURE — 80061 LIPID PANEL: CPT

## 2022-08-04 PROCEDURE — 84443 ASSAY THYROID STIM HORMONE: CPT

## 2022-08-04 PROCEDURE — 83735 ASSAY OF MAGNESIUM: CPT

## 2022-08-04 PROCEDURE — 81001 URINALYSIS AUTO W/SCOPE: CPT | Performed by: FAMILY MEDICINE

## 2022-08-04 PROCEDURE — 85025 COMPLETE CBC W/AUTO DIFF WBC: CPT

## 2022-08-04 PROCEDURE — 80053 COMPREHEN METABOLIC PANEL: CPT

## 2022-08-04 PROCEDURE — 36415 COLL VENOUS BLD VENIPUNCTURE: CPT

## 2022-08-06 DIAGNOSIS — I10 ESSENTIAL HYPERTENSION: ICD-10-CM

## 2022-08-08 RX ORDER — QUINAPRIL 40 MG/1
TABLET ORAL
Qty: 90 TABLET | Refills: 1 | Status: SHIPPED | OUTPATIENT
Start: 2022-08-08

## 2022-08-09 ENCOUNTER — OFFICE VISIT (OUTPATIENT)
Dept: PHYSICAL THERAPY | Facility: REHABILITATION | Age: 77
End: 2022-08-09
Payer: MEDICARE

## 2022-08-09 DIAGNOSIS — M54.12 CERVICAL RADICULOPATHY: Primary | ICD-10-CM

## 2022-08-09 DIAGNOSIS — M54.16 LUMBAR RADICULOPATHY: ICD-10-CM

## 2022-08-09 PROBLEM — Z00.00 WELL ADULT EXAM: Status: ACTIVE | Noted: 2022-08-09

## 2022-08-09 PROCEDURE — 97112 NEUROMUSCULAR REEDUCATION: CPT

## 2022-08-09 NOTE — PROGRESS NOTES
Daily Note     Today's date: 2022  Patient name: Sabino Jesus  : 1945  MRN: [id number]  Referring provider: Gill Churchill MD  Dx:   Encounter Diagnosis     ICD-10-CM    1  Cervical radiculopathy  M54 12    2  Lumbar radiculopathy  M54 16        Start Time: 0903  Stop Time: 1745  Total time in clinic (min): 47 minutes    Subjective: Nayeli Noguera reports some knee pain prior to start of session  Patient has 2 LOB incidents but denies falls  Objective: See treatment diary below      Assessment: Patient tolerate treatment well  Improved balance with tandem with 0 HHA for half of laps  Cues utilized to prevent on reliance of visual feedback of foot placement with good carryover demonstrated  Most challenged with unlocked platform during catch game with posterior anterior weight shifting  Patient CG with gait belt t/o session  Patient would benefit from continued PT to improved gait and balance  Plan: Continue per plan of care  Progress treatment as tolerated         Precautions: Fall Risk, Arthritis, Depression, HLD, HTN, Osteoporosis, bilateral knee pain, history of LBP     *indicates included in HEP  HEP code: [facility]         Neuro Re-Ed           NBOS balance           Semi-tandem balance           Standing with horizontal  head turns     :30x3 :30x3 30 x 3 on foam    Standing with vertical head turns     :30x3 :30x3 :30x3 on foam    Tandem balance     :30x2 ea      SL balance       :10x5 ea :20x5 ea   Tandem walking 3 laps  2 laps, 1 w/ and 1  w/out HHA 3 laps fwd/bwd w/ HHA 3 laps fwd/bwd w/ HHA 2 laps w/ HHA nv 3 laps w/ HHA 4 laps w/ HHA   Walking with horizontal  head turns 2 laps 2 laps 2 laps 2 laps 2 laps  2 laps  2 laps 2 laps   Walking with vertical head turns 2 laps 2 laps 2 laps 2 laps  2 laps 2 laps  2 laps 2 laps   Walking with diagonal head turns 1 lap ea  2 laps ea 2 laps ea    1 lap ea   Walking with marches 1 lap HHA  2 laps no HHA 3 laps 3 laps 3 laps       Backwards walking  EC fwd, EO bwd 1 lap  EC fwd, EO bwd 3 lap 2 laps 2 laps outdoors 3 laps indoors 4 laps indoors   Walking on uneven surface           Hurdles - fwd     3 laps, 5 hurdles varying heights 6 hurdles 5 laps outdoors     Hurdles - lateral     3 laps, 5 hurdles varying heights 6 hurdles 4 laps outdoors     Obstacle course       3 laps - hurdles, foam, foam balance beam, foam roller flat side down 3 laps - hurdles, foam, 8" step, foam balance beam, rocker board, wedge, foam roller flat side down   Rocker board           Biodex LOS 5x      5x 5x   Biodex catch game   8' - plate  locked and unlocked                   Patient education                      Ther Exercise           Bike  5' lvl 0 5' lvl 0 5' lvl 0 5' lvl 0 5' lvl 1 5' lvl 1 5' lvl 1 5' lvl 1   Bridges*            Clamshells*           Heel raises*           Toe raises*           Standing hip extension* 3x10 leaning on table     2x10 leaning on table     Standing hip abduction* 2x10 leaning on table     2x10 leaning on table                Ther Activity           Sit to stand*           Forward step ups           Lateral step ups

## 2022-08-10 ENCOUNTER — OFFICE VISIT (OUTPATIENT)
Dept: FAMILY MEDICINE CLINIC | Facility: CLINIC | Age: 77
End: 2022-08-10
Payer: MEDICARE

## 2022-08-10 VITALS
OXYGEN SATURATION: 97 % | DIASTOLIC BLOOD PRESSURE: 62 MMHG | WEIGHT: 166 LBS | SYSTOLIC BLOOD PRESSURE: 106 MMHG | HEART RATE: 80 BPM | BODY MASS INDEX: 27.66 KG/M2 | TEMPERATURE: 98.3 F | HEIGHT: 65 IN

## 2022-08-10 DIAGNOSIS — Z00.00 WELL ADULT EXAM: ICD-10-CM

## 2022-08-10 DIAGNOSIS — G25.81 RESTLESS LEGS SYNDROME (RLS): ICD-10-CM

## 2022-08-10 DIAGNOSIS — E78.2 MIXED HYPERLIPIDEMIA: ICD-10-CM

## 2022-08-10 DIAGNOSIS — I10 PRIMARY HYPERTENSION: Primary | ICD-10-CM

## 2022-08-10 DIAGNOSIS — F32.1 CURRENT MODERATE EPISODE OF MAJOR DEPRESSIVE DISORDER WITHOUT PRIOR EPISODE (HCC): ICD-10-CM

## 2022-08-10 DIAGNOSIS — M54.50 LOW BACK PAIN WITHOUT SCIATICA, UNSPECIFIED BACK PAIN LATERALITY, UNSPECIFIED CHRONICITY: ICD-10-CM

## 2022-08-10 PROCEDURE — 99214 OFFICE O/P EST MOD 30 MIN: CPT | Performed by: FAMILY MEDICINE

## 2022-08-10 RX ORDER — TRAMADOL HYDROCHLORIDE 50 MG/1
50 TABLET ORAL EVERY 6 HOURS PRN
Qty: 120 TABLET | Refills: 0 | Status: SHIPPED | OUTPATIENT
Start: 2022-08-10

## 2022-08-10 RX ORDER — PRAMIPEXOLE DIHYDROCHLORIDE 1 MG/1
2 TABLET ORAL DAILY
Qty: 180 TABLET | Refills: 1 | Status: SHIPPED | OUTPATIENT
Start: 2022-08-10

## 2022-08-10 RX ORDER — CHLORTHALIDONE 50 MG/1
50 TABLET ORAL DAILY
Qty: 90 TABLET | Refills: 1 | Status: SHIPPED | OUTPATIENT
Start: 2022-08-10

## 2022-08-10 NOTE — PROGRESS NOTES
Assessment/Plan:         Problem List Items Addressed This Visit        Cardiovascular and Mediastinum    Hypertension - Primary     Patient is stable with current anti-hypertensive medicine and continue to follow a low sodium diet and take current medication  All questions about this condition were answered today  Other    Restless legs syndrome (RLS)     Patient is stable  and will continue present plan of care and reassess at next routine visit  All questions about this problem from patient were answered today  Hyperlipidemia     Patient  is stable with current medication and we discussed a low fat low cholesterol diet  Weight loss also discussed for this will help lower cholesterol also  Recheck lipids in 6 months  Current moderate episode of major depressive disorder without prior episode [facility])     Patient to continue utilizing medical therapy as well and counseling sources as applicable for condition  If  suicidal thought or fear of suicide to contact 911 and seek help immediately  Meds reviewed and patient questions answered today         Well adult exam            Subjective:      Patient ID: Kyle Luong is a 78 y o  female  This is a 79-year-old female here today for multiple medical problems  Patient with hypertension chronic lymphedema  Patient also with osteoporosis her recent DEXA scan with history of ankle fracture  Patient also with hypercholesterolemia and with some GERD as well as restless leg syndrome  Patient is looking for some kind lymphedema boots for her legs because she is going to be seeing her cardiologist for procedure to try and relieve the lymphedema she has her legs it is really impacting her ability to walk  Also patient with osteoporosis and will see about reinstituting her Prolia        The following portions of the patient's history were reviewed and updated as appropriate:   Past Medical History:  She has a past medical history of Arthritis, BBB (bundle branch block), Colon polyp, Current moderate episode of major depressive disorder without prior episode ([facility] ) (4/16/2021), Current moderate episode of major depressive disorder without prior episode ([facility] ) (4/16/2021), Hyperlipidemia, Hypertension, Osteoporosis, and RLS (restless legs syndrome)  ,  _______________________________________________________________________  Medical Problems:  does not have any pertinent problems on file ,  _______________________________________________________________________  Past Surgical History:   has a past surgical history that includes Dilation and curettage of uterus; Nevus excision; Cataract extraction, bilateral; Colonoscopy (09/2015); Hernia repair (2013); Salpingectomy; AUGMENTATION BREAST (1976); Colonoscopy; and Augmentation mammaplasty (Bilateral, 1972)  ,  _______________________________________________________________________  Family History:  family history includes Colon cancer (age of onset: [de-identified]) in her maternal grandmother; Heart disease in her family; Hypertension in her family  ,  _______________________________________________________________________  Social History:   reports that she has never smoked  She has never used smokeless tobacco  She reports current alcohol use  She reports that she does not use drugs  ,  _______________________________________________________________________  Allergies:  is allergic to sertraline and ambien [zolpidem]     _______________________________________________________________________  Current Outpatient Medications   Medication Sig Dispense Refill    albuterol (ProAir HFA) 90 mcg/act inhaler Inhale 2 puffs every 6 (six) hours as needed for wheezing 8 5 g 1    atorvastatin (LIPITOR) 20 mg tablet TAKE 1 TABLET(20 MG) BY MOUTH DAILY 90 tablet 1    chlorthalidone (HYGROTEN) 50 MG tablet Take 1 tablet (50 mg total) by mouth daily 90 tablet 1    Cholecalciferol (VITAMIN D3 PO) Take by mouth      Cyanocobalamin (VITAMIN B12 PO) Take by mouth      fluticasone-salmeterol (Advair HFA) 115-21 MCG/ACT inhaler Inhale 2 puffs 2 (two) times a day Rinse mouth after use  12 g 2    GARLIC OIL PO Take by mouth      Omega-3 Fatty Acids (FISH OIL PO) Take by mouth      omeprazole (PriLOSEC) 20 mg delayed release capsule Take 1 capsule (20 mg total) by mouth daily before breakfast 90 capsule 3    pramipexole (MIRAPEX) 1 mg tablet Take 2 mg by mouth in the morning      quinapril (ACCUPRIL) 40 MG tablet TAKE 1 TABLET(40 MG) BY MOUTH DAILY 90 tablet 1    traMADol (ULTRAM) 50 mg tablet Take 1 tablet (50 mg total) by mouth every 6 (six) hours as needed for moderate pain 120 tablet 0     No current facility-administered medications for this visit      _______________________________________________________________________  Review of Systems   Constitutional: Negative for activity change, appetite change, fatigue and fever  HENT: Negative for congestion, ear pain, postnasal drip, rhinorrhea, sinus pressure, sinus pain, sneezing and sore throat  Eyes: Negative for pain and redness  Respiratory: Negative for apnea, cough, chest tightness, shortness of breath and wheezing  Cardiovascular: Positive for leg swelling  Negative for chest pain and palpitations  Gastrointestinal: Negative for abdominal pain, constipation, diarrhea, nausea and vomiting  Endocrine: Negative for cold intolerance and heat intolerance  Genitourinary: Negative for difficulty urinating, dysuria, frequency, hematuria and urgency  Musculoskeletal: Negative for arthralgias, back pain, gait problem and myalgias  Skin: Negative for rash  Neurological: Negative for dizziness, speech difficulty, weakness, numbness and headaches  Hematological: Does not bruise/bleed easily  Psychiatric/Behavioral: Negative for agitation, confusion and hallucinations  Objective: There were no vitals filed for this visit    There is no height or weight on file to calculate BMI  Physical Exam  Vitals and nursing note reviewed  Constitutional:       Appearance: She is well-developed  HENT:      Head: Normocephalic and atraumatic  Nose: Nose normal    Eyes:      General: No scleral icterus  Conjunctiva/sclera: Conjunctivae normal       Pupils: Pupils are equal, round, and reactive to light  Neck:      Thyroid: No thyromegaly  Cardiovascular:      Rate and Rhythm: Normal rate and regular rhythm  Pulmonary:      Effort: Pulmonary effort is normal       Breath sounds: Normal breath sounds  No wheezing  Abdominal:      General: Bowel sounds are normal  There is no distension  Palpations: Abdomen is soft  Tenderness: There is no abdominal tenderness  There is no guarding or rebound  Musculoskeletal:         General: No tenderness or deformity  Normal range of motion  Cervical back: Normal range of motion and neck supple  Skin:     General: Skin is warm and dry  Findings: No erythema or rash  Neurological:      Mental Status: She is alert and oriented to person, place, and time  Sensory: No sensory deficit  Gait: Gait abnormal    Psychiatric:         Behavior: Behavior normal          Thought Content:  Thought content normal          Judgment: Judgment normal

## 2022-08-11 ENCOUNTER — EVALUATION (OUTPATIENT)
Dept: PHYSICAL THERAPY | Facility: REHABILITATION | Age: 77
End: 2022-08-11
Payer: MEDICARE

## 2022-08-11 DIAGNOSIS — R26.9 GAIT ABNORMALITY: Primary | ICD-10-CM

## 2022-08-11 PROCEDURE — 97112 NEUROMUSCULAR REEDUCATION: CPT | Performed by: PHYSICAL THERAPIST

## 2022-08-11 NOTE — PROGRESS NOTES
PT Re-Evaluation     Today's date: 2022  Patient name: Alba Alberto  : 1945  MRN: [id number]  Referring provider: Cora Albert MD  Dx:   Encounter Diagnosis     ICD-10-CM    1  Gait abnormality  R26 9        Start Time: 1400  Stop Time: 1450  Total time in clinic (min): 50 minutes    Assessment  Assessment details: Per patient report and clinical findings, Alba Alberto has made good progress since starting skilled physical therapy services  Alba Alberto has been compliant with PT POC and HEP since initial evaluation with exception of 5 week lapse in POC secondary to covid; Aly Middleton reports regression in progress due to this lapse in POC  To this date, Alba Alberto has completed 13 visits of skilled physical therapy  Alba Alberto demonstrates improvements in objective data however, continues to be limited compared to her prior level of function  Remaining deficits include some LE strength deficits, decreased static/dynamic balance, and deficits in her confidence while ambulating  Recommend continued skilled physical therapy services to address remaining deficits to promote progress towards her prior level of function  Patient treated by ZHANE Candelario under direct PT supervision  Impairments: abnormal gait, activity intolerance, impaired balance, impaired physical strength and lacks appropriate home exercise program  Understanding of Dx/Px/POC: good   Prognosis: good    Goals  STG (4 weeks):  1  Increase FGA score to at least 18 to indicate improving dynamic balance  - met  2  Increase 30" sit to stand to at least 7 reps to progress towards age normative values  - met    LTG (8 weeks):  1  Able to ambulate on uneven surfaces confidently without fear of losing balance  - progressing  2  Increase FGA score to at least 22 to indicate decreased risk for falls  - met  3   Able to ascend/descend 1 flight of stairs without compensation to promote improved household and community ambulation  - progressing  4  Independent with HEP  - progressing    Plan  Plan details: Thank you for referring Park Winn to Physical Therapy at [facility] and for the opportunity to coordinate care  Patient would benefit from: skilled physical therapy  Planned therapy interventions: abdominal trunk stabilization, activity modification, ADL retraining, balance, balance/weight bearing training, behavior modification, body mechanics training, breathing training, fine motor coordination training, flexibility, functional ROM exercises, gait training, graded activity, graded exercise, graded motor, home exercise program, work reintegration, transfer training, therapeutic training, therapeutic exercise, therapeutic activities, stretching, strengthening, self care, postural training, patient education, neuromuscular re-education, muscle pump exercises, motor coordination training, Rizzo taping, manual therapy, joint mobilization and IADL retraining  Frequency: 2x week  Duration in visits: 10  Plan of Care beginning date: 8/11/2022  Treatment plan discussed with: patient        Subjective Evaluation    History of Present Illness  Mechanism of injury: 08/11/22:  Park Winn reports feeling she has made 40% progress since IE  Yonis Dunn reports improvements in her ability to self correct with LOB  Pt reports feeling some regressions in her improvements due to 5 weeks off secondary to covid  She reports that prior to that 5 weeks off she would have said she improved 60-70%  Despite improvements, Yonis Dunn reports continued difficulty with global LE strength and her confidence in walking  06/27/22:  Park Winn reports feeling she has made 40% progress since IE  Yonis Dunn reports improvements in ability to self correct her balance when she starts to lose her balance  Reports she feels she is paying more attention when she's walking which has helped decrease the amount of times she loses her balance   Reports on good days she's able to ascend/descend stairs reciprocally however, does continue to ascend/descend non-reciprocally on bad days and will sometimes crawl up a portion of her stairs since she doesn't have a railing to use  Eduard reports continued difficulty with stepping over objects or lifting her leg up high  States she went to step over something on Saturday when she was moving furniture and she stumbled backwards into the door  05/16/22:  Savage Avina presents to skilled physical therapy with complaints of difficulty walking when walking outdoors and feels off balance  Reports she has fallen twice, most recent being 4 weeks ago; states she caught herself on her elbows  Patient reports she started to notice decreased balance over the past two months  Since onset, symptoms have stayed the same  Currently, Eduard is having difficulty with walking on uneven surfaces and standing  Patient denies numbness, tingling in right leg, left leg, right foot and left foot  Next follow up appointment with physician is schedule in August 2022      Pain  No pain reported    Social Support  Steps to enter house: yes  1  Stairs in house: yes   Lives in: multiple-level home  Lives with: alone    Employment status: working  Treatments  Current treatment: physical therapy  Patient Goals  Patient goals for therapy: improved balance          Objective     Strength/Myotome Testing     Left Hip   Planes of Motion   Flexion: 4  Abduction: 5  Adduction: 5  External rotation: 4+ (L knee tenderness)  Internal rotation: 5    Right Hip   Planes of Motion   Flexion: 4+  Abduction: 5  Adduction: 5  External rotation: 5  Internal rotation: 5    Left Knee   Flexion: 5  Extension: 4+    Right Knee   Flexion: 5  Extension: 5    Left Ankle/Foot   Dorsiflexion: 5  Plantar flexion: 5    Right Ankle/Foot   Dorsiflexion: 5  Plantar flexion: 5  Neuro Exam:     Functional outcomes   Functional outcome comment: 8/11/22:  30" sit to stand: 7 (reported L knee discomfort)  NBOS EO: 60"  NBOS EC: 60"  R stance tandem: 60"  L stance tandem: 30"  L SL balance: 0", required use of hands to maintain balance  R SL balance: 0", required use of hands and contralateral toe tap to to maintain balance  Gait: no reciprocal arm swing, minimal trunk rotation  FGA: 26/30 with score of 22/30 or less indicative of increased fall risk  Tandem walking: slowed speed with tandem walking and has difficulty placing the feet close together but is able to self correct, occasionally corrective step required to maintain balance  Static balance: Juan Manuel Chao has increased difficulty with L tandem balance secondary to increased hip and knee discomfort    22  30" sit to stand: 7 (reported bilateral knee discomfort)  TUG: 10 seconds, no AD  Gait: minimal reciprocal arm swing, minimal trunk rotation  FGA:  with score of 22/30 or less indicative of increased fall risk      22:  30" sit to stand: 5 (reported bilateral knee discomfort)  TU seconds, no AD    mCTSIB     Phase 1: 60"     Phase 2: 60"     Phase 3: 60"     Phase 4: 60"  Gait: no reciprocal arm swing, minimal trunk rotation  FGA: 15/30 with score of 22/30 or less indicative of increased fall risk               Precautions: Fall Risk, Arthritis, Depression, HLD, HTN, Osteoporosis, bilateral knee pain, history of LBP     *indicates included in HEP  HEP code: [facility]       8   Neuro Re-Ed           NBOS balance           Semi-tandem balance           Standing with horizontal  head turns       30 x 3 on foam    Standing with vertical head turns       :30x3 on foam    Tandem balance           SL balance     2x30" ea  :10x5 ea :20x5 ea   Tandem walking 3 laps  2 laps, 1 w/ and 1  w/out HHA 3 laps fwd/bwd w/ HHA 3 laps fwd/bwd w/ HHA 3 laps (1 HHA, 2 no assist)  3 laps w/ HHA 4 laps w/ HHA   Walking with horizontal  head turns 2 laps 2 laps 2 laps 2 laps 2 laps  2 laps 2 laps   Walking with vertical head turns 2 laps 2 laps 2 laps 2 laps  2 laps  2 laps 2 laps   Walking with diagonal head turns 1 lap ea  2 laps ea 2 laps ea    1 lap ea   Walking with marches 1 lap HHA  2 laps no HHA 3 laps 3 laps 3 laps       Backwards walking  EC fwd, EO bwd 1 lap  EC fwd, EO bwd 3 lap 2 EC Fwd, 2 EO Bwd  3 laps indoors 4 laps indoors   Walking on uneven surface           Hurdles - fwd           Hurdles - lateral           Obstacle course       3 laps - hurdles, foam, foam balance beam, foam roller flat side down 3 laps - hurdles, foam, 8" step, foam balance beam, rocker board, wedge, foam roller flat side down   Rocker board           Biodex LOS 5x      5x 5x   Biodex catch game   8' - plate  locked and unlocked                   Patient education                      Ther Exercise           Bike  5' lvl 0 5' lvl 0 5' lvl 0 5' lvl 0 5' lvl 1  5' lvl 1 5' lvl 1   Bridges*            Clamshells*           Heel raises*           Toe raises*           Standing hip extension* 3x10 leaning on table          Standing hip abduction* 2x10 leaning on table                     Ther Activity           Sit to stand*     3x30"      Forward step ups           Lateral step ups

## 2022-08-15 ENCOUNTER — OFFICE VISIT (OUTPATIENT)
Dept: PHYSICAL THERAPY | Facility: REHABILITATION | Age: 77
End: 2022-08-15
Payer: MEDICARE

## 2022-08-15 DIAGNOSIS — R26.9 GAIT ABNORMALITY: Primary | ICD-10-CM

## 2022-08-15 PROCEDURE — 97112 NEUROMUSCULAR REEDUCATION: CPT | Performed by: PHYSICAL THERAPIST

## 2022-08-15 NOTE — PROGRESS NOTES
Daily Note     Today's date: 8/15/2022  Patient name: Yelena Landry  : 1945   MRN: [id number]  Referring provider: Sai Tejada MD  Dx:   Encounter Diagnosis     ICD-10-CM    1  Gait abnormality  R26 9        Start Time: 1400  Stop Time: 0910  Total time in clinic (min): 45 minutes    Subjective: No new complaints since previous session  Objective: See treatment diary below      Assessment: Tolerated treatment well  Megan Gupta did well with walking outside on uneven surfaces and had no LOB but did require HHA  Pt was able to maintain a good pace with uneven surface walking on rocks outside  Megan Gupta had increased fatigue after walking w/  potentially secondary to end of session fatigue  Patient demonstrated appropriate level of challenge and muscular fatigue throughout session and noted good status at end of visit  Patient demonstrated fatigue post treatment, exhibited good technique with therapeutic exercises and would benefit from continued PT  Patient treated by ZHANE Wilkins under direct PT supervision  Plan: Continue per plan of care  Progress treatment as tolerated         Precautions: Fall Risk, Arthritis, Depression, HLD, HTN, Osteoporosis, bilateral knee pain, history of LBP     *indicates included in HEP  HEP code: [facility]       8/9 8/11 8/15  6/14   Neuro Re-Ed           NBOS balance           Semi-tandem balance           Standing with horizontal  head turns           Standing with vertical head turns           Tandem balance           SL balance     2x30" ea   :20x5 ea   Tandem walking 3 laps  2 laps, 1 w/ and 1  w/out HHA 3 laps fwd/bwd w/ HHA 3 laps fwd/bwd w/ HHA 3 laps (1 HHA, 2 no assist) 2 laps outside HHA  4 laps w/ HHA   Walking with horizontal  head turns 2 laps 2 laps 2 laps 2 laps 2 laps   2 laps   Walking with vertical head turns 2 laps 2 laps 2 laps 2 laps  2 laps   2 laps   Walking with diagonal head turns 1 lap ea  2 laps ea 2 laps ea  2 laps outside  1 lap ea   Walking with marches 1 lap HHA  2 laps no HHA 3 laps 3 laps 3 laps  2 laps     Backwards walking  EC fwd, EO bwd 1 lap  EC fwd, EO bwd 3 lap 2 EC Fwd, 2 EO Bwd   4 laps indoors   Walking on uneven surface      2 laps on rocks outside     Hurdles - fwd           Hurdles - lateral           Obstacle course        3 laps - hurdles, foam, 8" step, foam balance beam, rocker board, wedge, foam roller flat side down   Rocker board           Biodex LOS 5x     3x unlocked no hands, 3 x locked one UE  5x   Biodex catch game   8' - plate  locked and unlocked                   Patient education                      Ther Exercise           Bike  5' lvl 0 5' lvl 0 5' lvl 0 5' lvl 0 5' lvl 1 5' lvl 1  5' lvl 1   Bridges*            Clamshells*           Heel raises*           Toe raises*           Standing hip extension* 3x10 leaning on table          Standing hip abduction* 2x10 leaning on table                     Ther Activity           Sit to stand*     3x30" 3x10     Forward step ups           Lateral step ups

## 2022-08-17 ENCOUNTER — OFFICE VISIT (OUTPATIENT)
Dept: PHYSICAL THERAPY | Facility: REHABILITATION | Age: 77
End: 2022-08-17
Payer: MEDICARE

## 2022-08-17 DIAGNOSIS — R26.9 GAIT ABNORMALITY: Primary | ICD-10-CM

## 2022-08-17 DIAGNOSIS — M54.12 CERVICAL RADICULOPATHY: ICD-10-CM

## 2022-08-17 DIAGNOSIS — M54.16 LUMBAR RADICULOPATHY: ICD-10-CM

## 2022-08-17 PROCEDURE — 97112 NEUROMUSCULAR REEDUCATION: CPT

## 2022-08-17 NOTE — PROGRESS NOTES
Daily Note     Today's date: 2022  Patient name: Mathieu Tovar  : 1945  MRN: [id number]  Referring provider: Jelena Frazier MD  Dx:   Encounter Diagnosis     ICD-10-CM    1  Gait abnormality  R26 9    2  Cervical radiculopathy  M54 12    3  Lumbar radiculopathy  M54 16        Start Time: 5426  Stop Time: 1532  Total time in clinic (min): 47 minutes    Subjective: Patient reports no falls since previous session, reporting no complaints  She reports most difficulty with balance with ambulation out doors  She reports she has been more self aware while at home stating "I realized I just have to take my time with things, don't go so fast " Patient reports no complaints after previous session  Objective: See treatment diary below      Assessment: Tolerated treatment well  Patient demonstrated fatigue post treatment, exhibited good technique with therapeutic exercises and would benefit from continued PT Patient tolerated all TE performed today well  Challenge noted with anterior weight shifting on biodex, cues required for proper weight shifting  HHA required for certain TE however overall good balance, no LOB noted  Fatigue noted throughout session  Plan: Continue per plan of care  Progress treatment as tolerated         Precautions: Fall Risk, Arthritis, Depression, HLD, HTN, Osteoporosis, bilateral knee pain, history of LBP     *indicates included in HEP  HEP code: [facility]       8/9 8/11 8/15 8/17 6/14   Neuro Re-Ed           NBOS balance           Semi-tandem balance           Standing with horizontal  head turns           Standing with vertical head turns           Tandem balance           SL balance     2x30" ea   :20x5 ea   Tandem walking 3 laps  2 laps, 1 w/ and 1  w/out HHA 3 laps fwd/bwd w/ HHA 3 laps fwd/bwd w/ HHA 3 laps (1 HHA, 2 no assist) 2 laps outside HHA 2 laps inside HHA 4 laps w/ HHA   Walking with horizontal  head turns 2 laps 2 laps 2 laps 2 laps 2 laps  2 laps outside 2 laps   Walking with vertical head turns 2 laps 2 laps 2 laps 2 laps  2 laps  2 laps outside 2 laps   Walking with diagonal head turns 1 lap ea  2 laps ea 2 laps ea  2 laps outside 2 laps outside 1 lap ea   Walking with marches 1 lap HHA  2 laps no HHA 3 laps 3 laps 3 laps  2 laps 2 laps indoors    Backwards walking  EC fwd, EO bwd 1 lap  EC fwd, EO bwd 3 lap 2 EC Fwd, 2 EO Bwd   4 laps indoors   Walking on uneven surface      2 laps on rocks outside 2 laps on rocks outside    [employer] - fwd           Hurdles - lateral           Obstacle course        3 laps - hurdles, foam, 8" step, foam balance beam, rocker board, wedge, foam roller flat side down   Rocker board           Biodex LOS 5x     3x unlocked no hands, 3 x locked one UE 3x unlocked no hands, 3x locked one UE  5x   Biodex catch game   8' - plate  locked and unlocked                   Patient education                      Ther Exercise           Bike  5' lvl 0 5' lvl 0 5' lvl 0 5' lvl 0 5' lvl 1 5' lvl 1 5' lvl 1 5' lvl 1   Bridges*            Clamshells*           Heel raises*           Toe raises*           Standing hip extension* 3x10 leaning on table          Standing hip abduction* 2x10 leaning on table                     Ther Activity           Sit to stand*     3x30" 3x10 3x10    Forward step ups           Lateral step ups

## 2022-08-18 ENCOUNTER — APPOINTMENT (OUTPATIENT)
Dept: PHYSICAL THERAPY | Facility: REHABILITATION | Age: 77
End: 2022-08-18
Payer: MEDICARE

## 2022-08-22 ENCOUNTER — OFFICE VISIT (OUTPATIENT)
Dept: PHYSICAL THERAPY | Facility: REHABILITATION | Age: 77
End: 2022-08-22
Payer: MEDICARE

## 2022-08-22 DIAGNOSIS — R26.9 GAIT ABNORMALITY: Primary | ICD-10-CM

## 2022-08-22 PROCEDURE — 97112 NEUROMUSCULAR REEDUCATION: CPT | Performed by: PHYSICAL THERAPIST

## 2022-08-22 NOTE — PROGRESS NOTES
Daily Note     Today's date: 2022  Patient name: Mathieu Tovar  : 1945  MRN: [id number]  Referring provider: Jelena Frazier MD  Dx:   Encounter Diagnosis     ICD-10-CM    1  Gait abnormality  R26 9        Start Time: 1400  Stop Time: 0580  Total time in clinic (min): 45 minutes    Subjective: Bonnie Worley reports she had two instances of minor LOB but was able to correct without falling  States both instances were because she didn't lift her foot high enough when trying to step over something  Objective: See treatment diary below      Assessment: Tolerated treatment well  Increased fatigue this session compared to previous sessions  Slow performance of walking with marches but able to hold SL balance for 2-3" with each march for improved SL stability  Patient demonstrated appropriate level of challenge and muscular fatigue throughout session and noted good status at end of visit  Patient demonstrated fatigue post treatment, exhibited good technique with therapeutic exercises and would benefit from continued PT  Plan: Continue per plan of care  Progress treatment as tolerated         Precautions: Fall Risk, Arthritis, Depression, HLD, HTN, Osteoporosis, bilateral knee pain, history of LBP     *indicates included in HEP  HEP code: [facility]       8/9 8/11 8/15 8/17 8/22   Neuro Re-Ed           NBOS balance           Semi-tandem balance           Standing with horizontal  head turns           Standing with vertical head turns           Tandem balance           SL balance     2x30" ea      Tandem walking 3 laps  2 laps, 1 w/ and 1  w/out HHA 3 laps fwd/bwd w/ HHA 3 laps fwd/bwd w/ HHA 3 laps (1 HHA, 2 no assist) 2 laps outside HHA 2 laps inside HHA 2 laps indoors no HHA for 2nd lap   Walking with horizontal  head turns 2 laps 2 laps 2 laps 2 laps 2 laps  2 laps outside    Walking with vertical head turns 2 laps 2 laps 2 laps 2 laps  2 laps  2 laps outside    Walking with diagonal head turns 1 lap ea  2 laps ea 2 laps ea  2 laps outside 2 laps outside    Walking with marches 1 lap HHA  2 laps no HHA 3 laps 3 laps 3 laps  2 laps 2 laps indoors 2 laps indoors   Backwards walking  EC fwd, EO bwd 1 lap  EC fwd, EO bwd 3 lap 2 EC Fwd, 2 EO Bwd      Walking on uneven surface      2 laps on rocks outside 2 laps on rocks outside    [employer] - fwd           Hurdles - lateral           Obstacle course           Rocker board           Biodex LOS 5x     3x unlocked no hands, 3 x locked one UE 3x unlocked no hands, 3x locked one UE  5x locked on hard w/out UE use, 5x unlocked with BUEs as needed   Biodex catch game   8' - plate  locked and unlocked                   Patient education                      Ther Exercise           Bike  5' lvl 0 5' lvl 0 5' lvl 0 5' lvl 0 5' lvl 1 5' lvl 1 5' lvl 1 5' lvl 1   Bridges*            Clamshells*           Heel raises*           Toe raises*           Standing hip extension* 3x10 leaning on table          Standing hip abduction* 2x10 leaning on table                     Ther Activity           Sit to stand*     3x30" 3x10 3x10    Forward step ups           Lateral step ups

## 2022-08-25 ENCOUNTER — OFFICE VISIT (OUTPATIENT)
Dept: PHYSICAL THERAPY | Facility: REHABILITATION | Age: 77
End: 2022-08-25
Payer: MEDICARE

## 2022-08-25 DIAGNOSIS — R26.9 GAIT ABNORMALITY: Primary | ICD-10-CM

## 2022-08-25 PROCEDURE — 97112 NEUROMUSCULAR REEDUCATION: CPT | Performed by: PHYSICAL THERAPIST

## 2022-08-25 NOTE — PROGRESS NOTES
Daily Note     Today's date: 2022  Patient name: Sacha Tijerina  : 1945  MRN: [id number]  Referring provider: Leah Ambriz MD  Dx:   Encounter Diagnosis     ICD-10-CM    1  Gait abnormality  R26 9        Start Time: 1400  Stop Time: 3369  Total time in clinic (min): 45 minutes    Subjective: Kristen Seth denies any LOB since previous session  Objective: See treatment diary below      Assessment: Tolerated treatment well  Demonstrates good static balance on compliant surface this session but did required occasional use of UEs to maintain balance  Patient demonstrated appropriate level of challenge and muscular fatigue throughout session and noted good status at end of visit  Patient demonstrated fatigue post treatment, exhibited good technique with therapeutic exercises and would benefit from continued PT  Plan: Continue per plan of care  Progress treatment as tolerated         Precautions: Fall Risk, Arthritis, Depression, HLD, HTN, Osteoporosis, bilateral knee pain, history of LBP     *indicates included in HEP  HEP code: [place]      8/25  6/28 8/9 8/11 8/15 8/17 8/22   Neuro Re-Ed           NBOS balance           Semi-tandem balance           Standing with horizontal  head turns           Standing with vertical head turns           Tandem balance           SL balance     2x30" ea      Tandem walking 2 laps w/ HHA, 2 laps w/out HHA indoors  3 laps fwd/bwd w/ HHA 3 laps fwd/bwd w/ HHA 3 laps (1 HHA, 2 no assist) 2 laps outside HHA 2 laps inside HHA 2 laps indoors no HHA for 2nd lap   Walking with horizontal  head turns   2 laps 2 laps 2 laps  2 laps outside    Walking with vertical head turns   2 laps 2 laps  2 laps  2 laps outside    Walking with diagonal head turns   2 laps ea 2 laps ea  2 laps outside 2 laps outside    Walking with marches 2 laps indoors  3 laps 3 laps  2 laps 2 laps indoors 2 laps indoors   Backwards walking Fwd EC, bwd EO 2 laps   EC fwd, EO bwd 3 lap 2 EC Fwd, 2 EO Bwd Walking on uneven surface      2 laps on rocks outside 2 laps on rocks outside    Hurdles - fwd           Hurdles - lateral           Obstacle course           Rocker board           Biodex LOS      3x unlocked no hands, 3 x locked one UE 3x unlocked no hands, 3x locked one UE  5x locked on hard w/out UE use, 5x unlocked with BUEs as needed   Biodex catch game 3' plate locked, 5' plate unlocked to 12   8' - plate  locked and unlocked                   Patient education                      Ther Exercise           Bike    5' lvl 0 5' lvl 0 5' lvl 1 5' lvl 1 5' lvl 1 5' lvl 1   Bridges*            Clamshells*           Heel raises*           Toe raises*           Standing hip extension*           Standing hip abduction*                      Ther Activity           Sit to stand*     3x30" 3x10 3x10    Forward step ups           Lateral step ups

## 2022-08-29 ENCOUNTER — OFFICE VISIT (OUTPATIENT)
Dept: PHYSICAL THERAPY | Facility: REHABILITATION | Age: 77
End: 2022-08-29
Payer: MEDICARE

## 2022-08-29 DIAGNOSIS — R26.9 GAIT ABNORMALITY: Primary | ICD-10-CM

## 2022-08-29 DIAGNOSIS — M54.16 LUMBAR RADICULOPATHY: ICD-10-CM

## 2022-08-29 DIAGNOSIS — M54.12 CERVICAL RADICULOPATHY: ICD-10-CM

## 2022-08-29 PROCEDURE — 97112 NEUROMUSCULAR REEDUCATION: CPT

## 2022-08-29 PROCEDURE — 97110 THERAPEUTIC EXERCISES: CPT

## 2022-08-29 NOTE — PROGRESS NOTES
Daily Note     Today's date: 2022  Patient name: Darcy Francois  : 1945  MRN: [id number]  Referring provider: Clayton Castellanos MD  Dx:   Encounter Diagnosis     ICD-10-CM    1  Gait abnormality  R26 9    2  Cervical radiculopathy  M54 12    3  Lumbar radiculopathy  M54 16                   Subjective: Patient offers no new complaints  Denies having any recent near falls  Objective: See treatment diary below      Assessment: Tolerated treatment well  Patient demonstrated difficulty with tandem walking this visit; increased reliance on hand held assist from PT  Able to complete tandem walking backwards with HHA from PT  Patient notes fatigue at end of session  Patient exhibited good technique with therapeutic exercises and would benefit from continued PT  Plan: Progress treatment as tolerated         Precautions: Fall Risk, Arthritis, Depression, HLD, HTN, Osteoporosis, bilateral knee pain, history of LBP     *indicates included in HEP  HEP code: [facility]      8/25 8/29 6/28 8/9 8/11 8/15 8/17 8/22   Neuro Re-Ed           NBOS balance           Semi-tandem balance           Standing with horizontal  head turns           Standing with vertical head turns           Tandem balance           SL balance     2x30" ea      Tandem walking 2 laps w/ HHA, 2 laps w/out HHA indoors 3 laps w HHA fwd, 1 bkwd 3 laps fwd/bwd w/ HHA 3 laps fwd/bwd w/ HHA 3 laps (1 HHA, 2 no assist) 2 laps outside HHA 2 laps inside HHA 2 laps indoors no HHA for 2nd lap   Walking with horizontal  head turns   2 laps 2 laps 2 laps  2 laps outside    Walking with vertical head turns   2 laps 2 laps  2 laps  2 laps outside    Walking with diagonal head turns  2 laps ea 2 laps ea 2 laps ea  2 laps outside 2 laps outside    Walking with marches 2 laps indoors 3 laps indoor 3 laps 3 laps  2 laps 2 laps indoors 2 laps indoors   Backwards walking Fwd EC, bwd EO 2 laps 1 lap bwd  EC fwd, EO bwd 3 lap 2 EC Fwd, 2 EO Bwd      Walking on uneven surface      2 laps on rocks outside 2 laps on rocks outside    Hurdles - fwd           Hurdles - lateral           Obstacle course           Rocker board           Biodex LOS  3x unlocked on 12 w/o UE use    3x unlocked no hands, 3 x locked one UE 3x unlocked no hands, 3x locked one UE  5x locked on hard w/out UE use, 5x unlocked with BUEs as needed   Biodex catch game 3' plate locked, 5' plate unlocked to 12   8' - plate  locked and unlocked        Biodex maze  Easy  2x  28%         Patient education                      Ther Exercise           Bike   5' 5' lvl 0 5' lvl 0 5' lvl 1 5' lvl 1 5' lvl 1 5' lvl 1   Bridges*            Clamshells*           Heel raises*           Toe raises*           Standing hip extension*           Standing hip abduction*                      Ther Activity           Sit to stand*  3x10   3x30" 3x10 3x10    Forward step ups           Lateral step ups

## 2022-09-01 ENCOUNTER — OFFICE VISIT (OUTPATIENT)
Dept: PHYSICAL THERAPY | Facility: REHABILITATION | Age: 77
End: 2022-09-01
Payer: MEDICARE

## 2022-09-01 DIAGNOSIS — R26.9 GAIT ABNORMALITY: Primary | ICD-10-CM

## 2022-09-01 PROCEDURE — 97112 NEUROMUSCULAR REEDUCATION: CPT | Performed by: PHYSICAL THERAPIST

## 2022-09-01 NOTE — PROGRESS NOTES
Daily Note     Today's date: 2022  Patient name: Park Winn  : 1945  MRN: [id number]  Referring provider: Owen Jarvis MD  Dx:   Encounter Diagnosis     ICD-10-CM    1  Gait abnormality  R26 9        Start Time: 1400  Stop Time: 6263  Total time in clinic (min): 49 minutes    Subjective: Yonis Dunn states she lost her balance backwards twice today, both times when stepping up a step/curb  Objective: See treatment diary below      Assessment: Tolerated treatment well  No significant LOB noted throughout session but occasional stepping reaction required during walking with marches  Patient demonstrated appropriate level of challenge and muscular fatigue throughout session and noted good status at end of visit  Patient demonstrated fatigue post treatment, exhibited good technique with therapeutic exercises and would benefit from continued PT  Plan: Continue per plan of care  Progress treatment as tolerated         Precautions: Fall Risk, Arthritis, Depression, HLD, HTN, Osteoporosis, bilateral knee pain, history of LBP     *indicates included in HEP  HEP code: [facility]      8/25 8/29 9/1  8/11 8/15 8/17 8/22   Neuro Re-Ed           NBOS balance           Semi-tandem balance           Standing with horizontal  head turns           Standing with vertical head turns           Tandem balance           SL balance     2x30" ea      Tandem walking 2 laps w/ HHA, 2 laps w/out HHA indoors 3 laps w HHA fwd, 1 bkwd   3 laps (1 HHA, 2 no assist) 2 laps outside HHA 2 laps inside HHA 2 laps indoors no HHA for 2nd lap   Walking with horizontal  head turns   4 laps  2 laps  2 laps outside    Walking with vertical head turns   4 laps  2 laps  2 laps outside    Walking with diagonal head turns  2 laps ea 4 laps ea   2 laps outside 2 laps outside    Walking with marches 2 laps indoors 3 laps indoor 4 laps   2 laps 2 laps indoors 2 laps indoors   Backwards walking Fwd EC, bwd EO 2 laps 1 lap bwd 4 laps  2 EC Fwd, 2 EO Bwd      Walking on uneven surface      2 laps on rocks outside 2 laps on rocks outside    [employer] - fwd           Hurdles - lateral           Obstacle course           Rocker board           Biodex LOS  3x unlocked on 12 w/o UE use 5x locked med, 5x locked hard   3x unlocked no hands, 3 x locked one UE 3x unlocked no hands, 3x locked one UE  5x locked on hard w/out UE use, 5x unlocked with BUEs as needed   Biodex catch game 3' plate locked, 5' plate unlocked to 12           Biodex maze  Easy  2x  28%         Patient education                      Ther Exercise           Bike   5' 5' lvl 2  5' lvl 1 5' lvl 1 5' lvl 1 5' lvl 1   Bridges*            Clamshells*           Heel raises*           Toe raises*           Standing hip extension*           Standing hip abduction*                      Ther Activity           Sit to stand*  3x10   3x30" 3x10 3x10    Forward step ups           Lateral step ups

## 2022-09-07 ENCOUNTER — OFFICE VISIT (OUTPATIENT)
Dept: PHYSICAL THERAPY | Facility: REHABILITATION | Age: 77
End: 2022-09-07
Payer: MEDICARE

## 2022-09-07 DIAGNOSIS — R26.9 GAIT ABNORMALITY: Primary | ICD-10-CM

## 2022-09-07 PROCEDURE — 97112 NEUROMUSCULAR REEDUCATION: CPT | Performed by: PHYSICAL THERAPIST

## 2022-09-07 NOTE — PROGRESS NOTES
Daily Note     Today's date: 2022  Patient name: Savage Avina  : 1945  MRN: [id number]  Referring provider: Noreen Kwok MD  Dx:   Encounter Diagnosis     ICD-10-CM    1  Gait abnormality  R26 9        Start Time: 53  Stop Time:   Total time in clinic (min): 55 minutes    Subjective: Bryn Griffin reports she's doing well at start of session but her low back is very bothersome today  Objective: See treatment diary below      Assessment: Tolerated treatment well  Improvement in weight shifting on Biodex this session compared to previous sessions  Patient demonstrated appropriate level of challenge and muscular fatigue throughout session and noted good status at end of visit  Patient demonstrated fatigue post treatment, exhibited good technique with therapeutic exercises and would benefit from continued PT  Plan: Continue per plan of care  Progress treatment as tolerated         Precautions: Fall Risk, Arthritis, Depression, HLD, HTN, Osteoporosis, bilateral knee pain, history of LBP     *indicates included in HEP  HEP code: [facility]      8/25 8/29 9/1 9/6  8/15 8/17 8/22   Neuro Re-Ed           NBOS balance           Semi-tandem balance           Standing with horizontal  head turns           Standing with vertical head turns           Tandem balance           SL balance           Tandem walking 2 laps w/ HHA, 2 laps w/out HHA indoors 3 laps w HHA fwd, 1 bkwd  3 laps no HHA  2 laps outside HHA 2 laps inside HHA 2 laps indoors no HHA for 2nd lap   Walking with horizontal  head turns   4 laps    2 laps outside    Walking with vertical head turns   4 laps    2 laps outside    Walking with diagonal head turns  2 laps ea 4 laps ea 2 laps ea  2 laps outside 2 laps outside    Walking with marches 2 laps indoors 3 laps indoor 4 laps   2 laps 2 laps indoors 2 laps indoors   Backwards walking Fwd EC, bwd EO 2 laps 1 lap bwd 4 laps        Walking on uneven surface      2 laps on rocks outside 2 laps on rocks outside    [facility] [address] - lateral           Obstacle course           Rocker board    15x 3-way       Biodex LOS  3x unlocked on 12 w/o UE use 5x locked med, 5x locked hard 5x locked med, 5x unlocked to 12 med, 5x locked hard  3x unlocked no hands, 3 x locked one UE 3x unlocked no hands, 3x locked one UE  5x locked on hard w/out UE use, 5x unlocked with BUEs as needed   Biodex catch game 3' plate locked, 5' plate unlocked to 12           Biodex maze  Easy  2x  28%         Patient education                      Ther Exercise           Bike   5' 5' lvl 2 5' lvl 2  5' lvl 1 5' lvl 1 5' lvl 1   Bridges*            Clamshells*           Heel raises*           Toe raises*           Standing hip extension*           Standing hip abduction*                      Ther Activity           Sit to stand*  3x10    3x10 3x10    Forward step ups           Lateral step ups

## 2022-09-08 ENCOUNTER — APPOINTMENT (OUTPATIENT)
Dept: PHYSICAL THERAPY | Facility: REHABILITATION | Age: 77
End: 2022-09-08
Payer: MEDICARE

## 2022-09-12 ENCOUNTER — OFFICE VISIT (OUTPATIENT)
Dept: PHYSICAL THERAPY | Facility: REHABILITATION | Age: 77
End: 2022-09-12
Payer: MEDICARE

## 2022-09-12 DIAGNOSIS — R26.9 GAIT ABNORMALITY: Primary | ICD-10-CM

## 2022-09-12 DIAGNOSIS — M54.16 LUMBAR RADICULOPATHY: ICD-10-CM

## 2022-09-12 PROCEDURE — 97112 NEUROMUSCULAR REEDUCATION: CPT

## 2022-09-12 NOTE — PROGRESS NOTES
Daily Note     Today's date: 2022  Patient name: Gita Garay  : 1945  MRN: [id number]  Referring provider: Jamie Ruff MD  Dx:   Encounter Diagnosis     ICD-10-CM    1  Gait abnormality  R26 9    2  Lumbar radiculopathy  M54 16        Start Time: 1400  Stop Time: 1445  Total time in clinic (min): 45 minutes    Subjective: Pt reports no medical changes since previous session  She will be having a follow up with MD tomorrow for nerve ablation per subjective intake  Objective: See treatment diary below      Assessment: Tolerated treatment well  Pt is given cues on reps for each exercise  PT remains CG to supervision with balance exercises  Patient exhibited good technique with therapeutic exercises and would benefit from continued PT She demonstrates no LOB, and requires intermittent rest breaks to minimize fatiue      Plan: Continue per plan of care        Precautions: Fall Risk, Arthritis, Depression, HLD, HTN, Osteoporosis, bilateral knee pain, history of LBP     *indicates included in HEP  HEP code: [facility]      8/25 8/29 9/1 9/6 9/12 8/15 8/17 8/22   Neuro Re-Ed           NBOS balance           Semi-tandem balance           Standing with horizontal  head turns           Standing with vertical head turns           Tandem balance           SL balance           Tandem walking 2 laps w/ HHA, 2 laps w/out HHA indoors 3 laps w HHA fwd, 1 bkwd  3 laps no HHA 3 laps no HHA 2 laps outside HHA 2 laps inside HHA 2 laps indoors no HHA for 2nd lap   Walking with horizontal  head turns   4 laps  4 laps  2 laps outside    Walking with vertical head turns   4 laps  4 laps  2 laps outside    Walking with diagonal head turns  2 laps ea 4 laps ea 2 laps ea 4 laps 2 laps outside 2 laps outside    Walking with marches 2 laps indoors 3 laps indoor 4 laps  4 laps 2 laps 2 laps indoors 2 laps indoors   Backwards walking Fwd EC, bwd EO 2 laps 1 lap bwd 4 laps  2 laps      Walking on uneven surface      2 laps on rocks outside 2 laps on rocks outside    [employer] - fwd     2 laps 3 hurtles      Hurdles - lateral     2 laps   3 hurtles      Obstacle course           Rocker board    15x 3-way 15x 3 way      Biodex LOS  3x unlocked on 12 w/o UE use 5x locked med, 5x locked hard 5x locked med, 5x unlocked to 12 med, 5x locked hard 3x locked med, 3x unlocked to 12 med, 5x locked hard 3x unlocked no hands, 3 x locked one UE 3x unlocked no hands, 3x locked one UE  5x locked on hard w/out UE use, 5x unlocked with BUEs as needed   Biodex catch game 3' plate locked, 5' plate unlocked to 12           Biodex maze  Easy  2x  28%         Patient education                      Ther Exercise           Bike   5' 5' lvl 2 5' lvl 2 5' lvl 2  5' lvl 1 5' lvl 1 5' lvl 1   Bridges*            Clamshells*           Heel raises*           Toe raises*           Standing hip extension*           Standing hip abduction*                      Ther Activity           Sit to stand*  3x10  3x10  3x10 3x10    Forward step ups           Lateral step ups

## 2022-09-15 ENCOUNTER — APPOINTMENT (OUTPATIENT)
Dept: PHYSICAL THERAPY | Facility: REHABILITATION | Age: 77
End: 2022-09-15
Payer: MEDICARE

## 2022-09-19 ENCOUNTER — EVALUATION (OUTPATIENT)
Dept: PHYSICAL THERAPY | Facility: REHABILITATION | Age: 77
End: 2022-09-19
Payer: MEDICARE

## 2022-09-19 DIAGNOSIS — R26.9 GAIT ABNORMALITY: Primary | ICD-10-CM

## 2022-09-19 PROCEDURE — 97112 NEUROMUSCULAR REEDUCATION: CPT | Performed by: PHYSICAL THERAPIST

## 2022-09-19 NOTE — PROGRESS NOTES
PT Re-Evaluation     Today's date: 2022  Patient name: Luciana Houston  : 1945  MRN: [id number]  Referring provider: Glen Dolan MD  Dx:   Encounter Diagnosis     ICD-10-CM    1  Gait abnormality  R26 9        Start Time:   Stop Time:   Total time in clinic (min): 44 minutes    Assessment  Assessment details: Per patient report and clinical findings, Luciana Houston has made good progress since starting skilled physical therapy services  Luciana Houston has been compliant with PT POC and HEP since initial evaluation with exception of 5 week lapse in POC secondary to covid  To this date, Luciana Houston has completed 22 visits of skilled physical therapy  Luciana Houston demonstrates improvements in objective data since previous re-evaluation and no longer presents at an increased risk for falls  Recommend a few additional visits of continued skilled physical therapy services to address remaining deficits to promote progress towards her prior level of function and transition to independent HEP at that time  Impairments: abnormal gait, activity intolerance, impaired balance, impaired physical strength and lacks appropriate home exercise program  Understanding of Dx/Px/POC: good   Prognosis: good    Goals  STG (4 weeks):  1  Increase FGA score to at least 18 to indicate improving dynamic balance  - met  2  Increase 30" sit to stand to at least 7 reps to progress towards age normative values  - met    LTG (8 weeks):  1  Able to ambulate on uneven surfaces confidently without fear of losing balance  - met  2  Increase FGA score to at least 22 to indicate decreased risk for falls  - met  3  Able to ascend/descend 1 flight of stairs without compensation to promote improved household and community ambulation  - met  4  Independent with HEP  - progressing    Plan  Plan details:  Thank you for referring Luciana Houston to Physical Therapy at [facility] and for the opportunity to coordinate care     Patient would benefit from: skilled physical therapy  Planned therapy interventions: abdominal trunk stabilization, activity modification, ADL retraining, balance, balance/weight bearing training, behavior modification, body mechanics training, breathing training, fine motor coordination training, flexibility, functional ROM exercises, gait training, graded activity, graded exercise, graded motor, home exercise program, work reintegration, transfer training, therapeutic training, therapeutic exercise, therapeutic activities, stretching, strengthening, self care, postural training, patient education, neuromuscular re-education, muscle pump exercises, motor coordination training, Rizzo taping, manual therapy, joint mobilization and IADL retraining  Frequency: 2x week  Duration in weeks: 2  Plan of Care beginning date: 9/19/2022  Plan of Care expiration date: 10/3/2022  Treatment plan discussed with: patient        Subjective Evaluation    History of Present Illness  Mechanism of injury: 09/19/22:  Viri Adwoa reports feeling she has made 90% progress since IE  Ashwin Adams reports improvements in walking, her awareness in picking her feet up, ability to catch her balance when she starts to fall, and decreased instances of worrying about falling  Despite improvements, Conwaylotus Adams reports continued difficulty with stepping over objects, primarily with the RLE>LLE  States she was weeding in her garden over the weekend on her hands and knees and was unable to stand up; states she needed to crawl to a post to help herself up        08/11/22:  Viri Orona reports feeling she has made 40% progress since IE  Ashwin Adams reports improvements in her ability to self correct with LOB  Pt reports feeling some regressions in her improvements due to 5 weeks off secondary to covid  She reports that prior to that 5 weeks off she would have said she improved 60-70%   Despite improvements, Ashwin Adams reports continued difficulty with global LE strength and her confidence in walking  06/27/22:  Wade Blakely reports feeling she has made 40% progress since IE  Vu Arrington reports improvements in ability to self correct her balance when she starts to lose her balance  Reports she feels she is paying more attention when she's walking which has helped decrease the amount of times she loses her balance  Reports on good days she's able to ascend/descend stairs reciprocally however, does continue to ascend/descend non-reciprocally on bad days and will sometimes crawl up a portion of her stairs since she doesn't have a railing to use  Vu Arrington reports continued difficulty with stepping over objects or lifting her leg up high  States she went to step over something on Saturday when she was moving furniture and she stumbled backwards into the door  05/16/22:  Wade Blakely presents to skilled physical therapy with complaints of difficulty walking when walking outdoors and feels off balance  Reports she has fallen twice, most recent being 4 weeks ago; states she caught herself on her elbows  Patient reports she started to notice decreased balance over the past two months  Since onset, symptoms have stayed the same  Currently, Vu Arrington is having difficulty with walking on uneven surfaces and standing  Patient denies numbness, tingling in right leg, left leg, right foot and left foot  Next follow up appointment with physician is schedule in August 2022      Pain  No pain reported    Social Support  Steps to enter house: yes  1  Stairs in house: yes   Lives in: multiple-level home  Lives with: alone    Employment status: working  Treatments  Current treatment: physical therapy  Patient Goals  Patient goals for therapy: improved balance          Objective     Strength/Myotome Testing     Left Hip   Planes of Motion   Flexion: 5  Abduction: 5  Adduction: 5  External rotation: 4+  Internal rotation: 5    Right Hip   Planes of Motion   Flexion: 5  Abduction: 5  Adduction: 5  External rotation: 5  Internal rotation: 5    Left Knee   Flexion: 5  Extension: 5    Right Knee   Flexion: 5  Extension: 5    Left Ankle/Foot   Dorsiflexion: 5  Plantar flexion: 5    Right Ankle/Foot   Dorsiflexion: 5  Plantar flexion: 5  Neuro Exam:     Functional outcomes   Functional outcome comment:   22:  30" sit to stand: 9    22:  30" sit to stand: 7 (reported L knee discomfort)  NBOS EO: 60"  NBOS EC: 60"  R stance tandem: 60"  L stance tandem: 30"  L SL balance: 0", required use of hands to maintain balance  R SL balance: 0", required use of hands and contralateral toe tap to to maintain balance  Gait: no reciprocal arm swing, minimal trunk rotation  FGA:  with score of 22/30 or less indicative of increased fall risk  Tandem walking: slowed speed with tandem walking and has difficulty placing the feet close together but is able to self correct, occasionally corrective step required to maintain balance  Static balance: Fred Merritt has increased difficulty with L tandem balance secondary to increased hip and knee discomfort    22  30" sit to stand: 7 (reported bilateral knee discomfort)  TUG: 10 seconds, no AD  Gait: minimal reciprocal arm swing, minimal trunk rotation  FGA:  with score of 22/30 or less indicative of increased fall risk      22:  30" sit to stand: 5 (reported bilateral knee discomfort)  TU seconds, no AD    mCTSIB     Phase 1: 60"     Phase 2: 60"     Phase 3: 60"     Phase 4: 60"  Gait: no reciprocal arm swing, minimal trunk rotation  FGA: 1530 with score of 22/30 or less indicative of increased fall risk          Flowsheet Rows    Flowsheet Row Most Recent Value   PT/OT G-Codes    Current Score 53   Projected Score 58            Precautions: Fall Risk, Arthritis, Depression, HLD, HTN, Osteoporosis, bilateral knee pain, history of LBP     *indicates included in HEP  HEP code: [facility]         Neuro Re-Ed           NBOS balance           Semi-tandem balance           Standing with horizontal  head turns           Standing with vertical head turns           Tandem balance           SL balance           Tandem walking 2 laps w/ HHA, 2 laps w/out HHA indoors 3 laps w HHA fwd, 1 bkwd  3 laps no HHA 3 laps no HHA 3 laps no HHA  2 laps indoors no HHA for 2nd lap   Walking with horizontal  head turns   4 laps  4 laps      Walking with vertical head turns   4 laps  4 laps      Walking with diagonal head turns  2 laps ea 4 laps ea 2 laps ea 4 laps      Walking with marches 2 laps indoors 3 laps indoor 4 laps  4 laps 3 laps  2 laps indoors   Backwards walking Fwd EC, bwd EO 2 laps 1 lap bwd 4 laps  2 laps Fwd EC, bwd EO 3 laps     Walking on uneven surface           Hurdles - fwd     2 laps 3 hurtles      Hurdles - lateral     2 laps   3 hurtles      Obstacle course           Rocker board    15x 3-way 15x 3 way 15x 3-way     Biodex LOS  3x unlocked on 12 w/o UE use 5x locked med, 5x locked hard 5x locked med, 5x unlocked to 12 med, 5x locked hard 3x locked med, 3x unlocked to 12 med, 5x locked hard   5x locked on hard w/out UE use, 5x unlocked with BUEs as needed   Biodex catch game 3' plate locked, 5' plate unlocked to 12           Biodex maze  Easy  2x  28%         Patient education                      Ther Exercise           Bike   5' 5' lvl 2 5' lvl 2 5' lvl 2  np  5' lvl 1   Bridges*            Clamshells*           Heel raises*           Toe raises*           Standing hip extension*           Standing hip abduction*                      Ther Activity           Sit to stand*  3x10  3x10       Forward step ups           Lateral step ups

## 2022-09-22 ENCOUNTER — OFFICE VISIT (OUTPATIENT)
Dept: PHYSICAL THERAPY | Facility: REHABILITATION | Age: 77
End: 2022-09-22
Payer: MEDICARE

## 2022-09-22 DIAGNOSIS — R26.9 GAIT ABNORMALITY: Primary | ICD-10-CM

## 2022-09-22 PROCEDURE — 97112 NEUROMUSCULAR REEDUCATION: CPT

## 2022-09-22 NOTE — PROGRESS NOTES
Daily Note     Today's date: 2022  Patient name: Sacha Tijerina  : 1945  MRN: [id number]  Referring provider: Leah Ambriz MD  Dx:   Encounter Diagnosis     ICD-10-CM    1  Gait abnormality  R26 9        Start Time: 1400  Stop Time: 70  Total time in clinic (min): 45 minutes    Subjective: Patient reports no falls since previous session, reporting overall improvements with balance since starting PT  She reports most challenge at this point stepping over objects  Objective: See treatment diary below      Assessment: Tolerated treatment well  Patient demonstrated fatigue post treatment, exhibited good technique with therapeutic exercises and would benefit from continued PT One LOB noted with tandem walking, min assist from therapist required from therapist  Tolerated all other TE performed today well  Potential D/C next session  Plan: Continue per plan of care  Progress treatment as tolerated         Precautions: Fall Risk, Arthritis, Depression, HLD, HTN, Osteoporosis, bilateral knee pain, history of LBP     *indicates included in HEP  HEP code: [facility]         Neuro Re-Ed           NBOS balance           Semi-tandem balance           Standing with horizontal  head turns           Standing with vertical head turns           Tandem balance           SL balance           Tandem walking 2 laps w/ HHA, 2 laps w/out HHA indoors 3 laps w HHA fwd, 1 bkwd  3 laps no HHA 3 laps no HHA 3 laps no HHA 3 laps  2 laps indoors no HHA for 2nd lap   Walking with horizontal  head turns   4 laps  4 laps  3 laps    Walking with vertical head turns   4 laps  4 laps  3 laps    Walking with diagonal head turns  2 laps ea 4 laps ea 2 laps ea 4 laps      Walking with marches 2 laps indoors 3 laps indoor 4 laps  4 laps 3 laps 3 laps 2 laps indoors   Backwards walking Fwd EC, bwd EO 2 laps 1 lap bwd 4 laps  2 laps Fwd EC, bwd EO 3 laps Fwd EC, bwd EO 3 laps ea    Walking on uneven surface           Hurdles - fwd     2 laps 3 hurtles  3 hurdles 3 laps    Hurdles - lateral     2 laps   3 hurtles  3 hurdles 3 laps    Obstacle course           Rocker board    15x 3-way 15x 3 way 15x 3-way 15x 3 way    Biodex LOS  3x unlocked on 12 w/o UE use 5x locked med, 5x locked hard 5x locked med, 5x unlocked to 12 med, 5x locked hard 3x locked med, 3x unlocked to 12 med, 5x locked hard   5x locked on hard w/out UE use, 5x unlocked with BUEs as needed   Biodex catch game 3' plate locked, 5' plate unlocked to 12           Biodex maze  Easy  2x  28%         Patient education                      Ther Exercise           Bike   5' 5' lvl 2 5' lvl 2 5' lvl 2  np 5' lvl 1 5' lvl 1   Bridges*            Clamshells*           Heel raises*           Toe raises*           Standing hip extension*           Standing hip abduction*                      Ther Activity           Sit to stand*  3x10  3x10       Forward step ups           Lateral step ups

## 2022-09-26 ENCOUNTER — OFFICE VISIT (OUTPATIENT)
Dept: PHYSICAL THERAPY | Facility: REHABILITATION | Age: 77
End: 2022-09-26
Payer: MEDICARE

## 2022-09-26 DIAGNOSIS — R26.9 GAIT ABNORMALITY: Primary | ICD-10-CM

## 2022-09-26 PROCEDURE — 97112 NEUROMUSCULAR REEDUCATION: CPT | Performed by: PHYSICAL THERAPIST

## 2022-09-26 NOTE — PROGRESS NOTES
Daily Note/Discharge Note      Today's date: 2022  Patient name: Anahy Nguyen  : 1945  MRN: [id number]  Referring provider: Artemio Blandon MD  Dx:   Encounter Diagnosis     ICD-10-CM    1  Gait abnormality  R26 9        Start Time:   Stop Time:   Total time in clinic (min): 50 minutes    Subjective: Stella Kauffman reports she's doing well at start of session  States she attended a wedding this weekend and didn't have issues regarding her balance  Objective: See treatment diary below      Assessment: Tolerated treatment well  Occasional minor LOB during dynamic balance exercises with patient demonstrating good ability to self correct without need for therapist assist  Patient demonstrated fatigue post treatment and exhibited good technique with therapeutic exercises  Plan: Discharge to independent HEP  Updated and reviewed HEP with patient; patient verbalized and demonstrated understanding of all exercises       Precautions: Fall Risk, Arthritis, Depression, HLD, HTN, Osteoporosis, bilateral knee pain, history of LBP     *indicates included in HEP  HEP code: [facility]         Neuro Re-Ed           NBOS balance           Semi-tandem balance           Standing with horizontal  head turns           Standing with vertical head turns           Tandem balance           SL balance           Tandem walking* 2 laps w/ HHA, 2 laps w/out HHA indoors 3 laps w HHA fwd, 1 bkwd  3 laps no HHA 3 laps no HHA 3 laps no HHA 3 laps  3 laps   Walking with horizontal  head turns   4 laps  4 laps  3 laps 3 laps   Walking with vertical head turns   4 laps  4 laps  3 laps 3 laps   Walking with diagonal head turns  2 laps ea 4 laps ea 2 laps ea 4 laps   3 laps   Walking with marches 2 laps indoors 3 laps indoor 4 laps  4 laps 3 laps 3 laps 3 laps   Backwards walking Fwd EC, bwd EO 2 laps 1 lap bwd 4 laps  2 laps Fwd EC, bwd EO 3 laps Fwd EC, bwd EO 3 laps ea    Walking on uneven surface           Hurdles - fwd     2 laps 3 hurtles  3 hurdles 3 laps    Hurdles - lateral     2 laps   3 hurtles  3 hurdles 3 laps    Obstacle course           Rocker board    15x 3-way 15x 3 way 15x 3-way 15x 3 way    Biodex LOS  3x unlocked on 12 w/o UE use 5x locked med, 5x locked hard 5x locked med, 5x unlocked to 12 med, 5x locked hard 3x locked med, 3x unlocked to 12 med, 5x locked hard      Biodex catch game 3' plate locked, 5' plate unlocked to 12           Biodex maze  Easy  2x  28%         Patient education                      Ther Exercise           Bike   5' 5' lvl 2 5' lvl 2 5' lvl 2  np 5' lvl 1 5' lvl 2   Bridges*            Clamshells*           Heel raises*           Toe raises*           Standing hip extension*           Standing hip abduction*                      Ther Activity           Sit to stand*  3x10  3x10       Forward step ups           Lateral step ups

## 2022-09-29 ENCOUNTER — APPOINTMENT (OUTPATIENT)
Dept: PHYSICAL THERAPY | Facility: REHABILITATION | Age: 77
End: 2022-09-29
Payer: MEDICARE

## 2022-10-11 PROBLEM — Z00.00 WELL ADULT EXAM: Status: RESOLVED | Noted: 2022-08-09 | Resolved: 2022-10-11

## 2022-11-04 DIAGNOSIS — E78.2 MIXED HYPERLIPIDEMIA: ICD-10-CM

## 2022-11-04 RX ORDER — ATORVASTATIN CALCIUM 20 MG/1
TABLET, FILM COATED ORAL
Qty: 90 TABLET | Refills: 1 | Status: SHIPPED | OUTPATIENT
Start: 2022-11-04

## 2022-11-09 NOTE — PROGRESS NOTES
Name: Yosef Samson      : 1945      MRN: [id number]  Encounter Provider: Ayan Pedraza MD  Encounter Date: 11/10/2022   Encounter department: [address] [place] [address] Place     1  Primary hypertension  Assessment & Plan:  Patient is stable with current anti-hypertensive medicine and continue to follow a low sodium diet and take current medication  All questions about this condition were answered today  2  Current moderate episode of major depressive disorder without prior episode [facility])  Assessment & Plan:  Patient to continue utilizing medical therapy as well and counseling sources as applicable for condition  If  suicidal thought or fear of suicide to contact 911 and seek help immediately  Meds reviewed and patient questions answered today      3  Mixed hyperlipidemia  Assessment & Plan:  Patient  is stable with current medication and we discussed a low fat low cholesterol diet  Weight loss also discussed for this will help lower cholesterol also  Recheck lipids in 6 months  4  Restless legs syndrome (RLS)  Assessment & Plan:  Patient is stable  and will continue present plan of care and reassess at next routine visit  All questions about this problem from patient were answered today  Depression Screening and Follow-up Plan: Patient assessed for underlying major depression  Brief counseling provided and recommend additional follow-up/re-evaluation next office visit  Patient advised to follow-up with PCP for further management  Urinary Incontinence Plan of Care: counseling topics discussed: practice Kegel (pelvic floor strengthening) exercises, use restroom every 2 hours, limit alcohol, caffeine, spicy foods, and acidic foods, limiting fluid intake 3-4 hours before bed, weight loss, preventing constipation and limiting fluid intake to 60 oz  per day  Subjective     Is a 81-year-old female here today for checkup on multiple medical problems  Patient with hypertension osteoporosis will be getting her Prolia shot today  Patient also with some peripheral edema and lymphedema and now has compression boots that is doing very well for her she wears them in our on both legs for twice a day and is doing better with that  Patient also needs some refilled her elevate his all which is cream she uses for her legs are dermatitis and she is doing well with that also  Patient her lab work from August reviewed is doing great  She is due for her lab work for cholesterol liver function test at her next visit  Review of Systems   Constitutional: Negative for activity change, appetite change, fatigue and fever  HENT: Negative for congestion, ear pain, postnasal drip, rhinorrhea, sinus pressure, sinus pain, sneezing and sore throat  Eyes: Negative for pain and redness  Respiratory: Negative for apnea, cough, chest tightness, shortness of breath and wheezing  Cardiovascular: Positive for leg swelling  Negative for chest pain and palpitations  Gastrointestinal: Negative for abdominal pain, constipation, diarrhea, nausea and vomiting  Endocrine: Negative for cold intolerance and heat intolerance  Genitourinary: Negative for difficulty urinating, dysuria, frequency, hematuria and urgency  Musculoskeletal: Negative for arthralgias, back pain, gait problem and myalgias  Skin: Negative for rash  Neurological: Negative for dizziness, speech difficulty, weakness, numbness and headaches  Hematological: Does not bruise/bleed easily  Psychiatric/Behavioral: Negative for agitation, confusion and hallucinations         Past Medical History:   Diagnosis Date   • Arthritis    • BBB (bundle branch block)    • Colon polyp    • Current moderate episode of major depressive disorder without prior episode ([facility] ) 04/16/2021   • Current moderate episode of major depressive disorder without prior episode ([facility] ) 04/16/2021   • Headache(784 0) 1961   • Hyperlipidemia • Hypertension    • Osteoporosis    • RLS (restless legs syndrome)      Past Surgical History:   Procedure Laterality Date   • AUGMENTATION BREAST     • AUGMENTATION MAMMAPLASTY Bilateral    • CATARACT EXTRACTION, BILATERAL     • COLONOSCOPY  2015   • COLONOSCOPY     • DILATION AND CURETTAGE OF UTERUS     • HERNIA REPAIR     • NEVUS EXCISION     • SALPINGECTOMY      for endometrosis     Family History   Problem Relation Age of Onset   • Heart disease Family    • Hypertension Family    • Colon cancer Maternal Grandmother [de-identified]   • Arthritis Maternal Grandmother    • Cancer Maternal Grandmother    • Arthritis Mother    • Hearing loss Mother    • Mental illness Mother         Bi-polar   • Bipolar disorder Mother    • Schizophrenia Mother      Social History     Socioeconomic History   • Marital status:      Spouse name: Not on file   • Number of children: Not on file   • Years of education: Not on file   • Highest education level: Not on file   Occupational History   • 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   Tobacco Use   • Smoking status: Never Smoker   • Smokeless tobacco: Never Used   Vaping Use   • Vaping Use: Never used   Substance and Sexual Activity   • Alcohol use: Not Currently     Comment: Rare   • Drug use: Never     Comment: No use   • Sexual activity: Not Currently     Partners: Male     Birth control/protection: Post-menopausal   Other Topics Concern   • Not on file   Social History Narrative    · Most recent tobacco use screenin2019      · Do you currently or have you served in the [employer]:   No      · Were you activated, into active duty, as a member of the [employer] or as a Reservist:   No      · 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      · Exercise level: Moderate      · Diet:   Regular      · General stress level:   High      · Alcohol intake:   None      · Caffeine intake:    Moderate      · Seat belts used routinely:   Yes      · Sunscreen used routinely: Yes      · Smoke alarm in home: Yes      · Advance directive:   No      · Has the Patient had a mammogram to screen for breast cancer within 24 months:   Yes      · Live alone or with others:   alone      · Are you currently employed:   Yes      Social Determinants of Health     Financial Resource Strain: Not on file   Food Insecurity: Not on file   Transportation Needs: Not on file   Physical Activity: Not on file   Stress: Not on file   Social Connections: Not on file   Intimate Partner Violence: Not on file   Housing Stability: Not on file     Current Outpatient Medications on File Prior to Visit   Medication Sig   • albuterol (ProAir HFA) 90 mcg/act inhaler Inhale 2 puffs every 6 (six) hours as needed for wheezing   • atorvastatin (LIPITOR) 20 mg tablet TAKE 1 TABLET(20 MG) BY MOUTH DAILY   • chlorthalidone (HYGROTEN) 50 MG tablet Take 1 tablet (50 mg total) by mouth daily   • Cholecalciferol (VITAMIN D3 PO) Take by mouth   • Cyanocobalamin (VITAMIN B12 PO) Take by mouth   • fluticasone-salmeterol (Advair HFA) 115-21 MCG/ACT inhaler Inhale 2 puffs 2 (two) times a day Rinse mouth after use     • GARLIC OIL PO Take by mouth   • Omega-3 Fatty Acids (FISH OIL PO) Take by mouth   • omeprazole (PriLOSEC) 20 mg delayed release capsule Take 1 capsule (20 mg total) by mouth daily before breakfast   • pramipexole (MIRAPEX) 1 mg tablet Take 2 tablets (2 mg total) by mouth in the morning   • quinapril (ACCUPRIL) 40 MG tablet TAKE 1 TABLET(40 MG) BY MOUTH DAILY   • traMADol (ULTRAM) 50 mg tablet Take 1 tablet (50 mg total) by mouth every 6 (six) hours as needed for moderate pain     Allergies   Allergen Reactions   • Sertraline Visual Disturbance     Reaction Date: [id number];    • Ambien [Zolpidem] Other (See Comments)     Was doing things she did not know she was doing     Immunization History   Administered Date(s) Administered   • COVID-19 MODERNA VACC 0 5 ML IM 02/12/2021, 03/11/2021, 11/19/2021, 04/26/2022   • H1N1, All Formulations 12/04/2009   • INFLUENZA 09/16/2015, 11/04/2016, 10/21/2017, 11/05/2020, 10/20/2021   • Influenza, high dose seasonal 0 7 mL 11/05/2020   • Influenza, seasonal, injectable 10/11/2007, 11/04/2008, 09/01/2009   • Pneumococcal Conjugate 13-Valent 10/05/2016   • Zoster 05/01/2017       Objective     There were no vitals taken for this visit  Physical Exam  Vitals and nursing note reviewed  Constitutional:       Appearance: She is well-developed  HENT:      Head: Normocephalic and atraumatic  Nose: Nose normal       Mouth/Throat:      Mouth: Mucous membranes are moist    Eyes:      General: No scleral icterus  Conjunctiva/sclera: Conjunctivae normal       Pupils: Pupils are equal, round, and reactive to light  Neck:      Thyroid: No thyromegaly  Cardiovascular:      Rate and Rhythm: Normal rate and regular rhythm  Pulmonary:      Effort: Pulmonary effort is normal       Breath sounds: Normal breath sounds  No wheezing  Abdominal:      General: Bowel sounds are normal  There is no distension  Palpations: Abdomen is soft  Tenderness: There is no abdominal tenderness  There is no guarding or rebound  Musculoskeletal:         General: No tenderness or deformity  Normal range of motion  Cervical back: Normal range of motion and neck supple  Skin:     General: Skin is warm and dry  Findings: No erythema or rash  Neurological:      Mental Status: She is alert and oriented to person, place, and time  Sensory: No sensory deficit  Psychiatric:         Mood and Affect: Mood normal          Behavior: Behavior normal          Thought Content:  Thought content normal          Judgment: Judgment normal        Diana Falcon MD

## 2022-11-10 ENCOUNTER — OFFICE VISIT (OUTPATIENT)
Dept: FAMILY MEDICINE CLINIC | Facility: CLINIC | Age: 77
End: 2022-11-10

## 2022-11-10 VITALS
RESPIRATION RATE: 18 BRPM | OXYGEN SATURATION: 97 % | TEMPERATURE: 97.9 F | HEART RATE: 68 BPM | BODY MASS INDEX: 27.82 KG/M2 | DIASTOLIC BLOOD PRESSURE: 74 MMHG | WEIGHT: 167 LBS | SYSTOLIC BLOOD PRESSURE: 122 MMHG | HEIGHT: 65 IN

## 2022-11-10 DIAGNOSIS — M81.0 AGE-RELATED OSTEOPOROSIS WITHOUT CURRENT PATHOLOGICAL FRACTURE: ICD-10-CM

## 2022-11-10 DIAGNOSIS — I89.0 LYMPHEDEMA: ICD-10-CM

## 2022-11-10 DIAGNOSIS — F32.1 CURRENT MODERATE EPISODE OF MAJOR DEPRESSIVE DISORDER WITHOUT PRIOR EPISODE (HCC): ICD-10-CM

## 2022-11-10 DIAGNOSIS — I10 PRIMARY HYPERTENSION: Primary | ICD-10-CM

## 2022-11-10 DIAGNOSIS — G25.81 RESTLESS LEGS SYNDROME (RLS): ICD-10-CM

## 2022-11-10 DIAGNOSIS — L30.9 DERMATITIS: ICD-10-CM

## 2022-11-10 DIAGNOSIS — E78.2 MIXED HYPERLIPIDEMIA: ICD-10-CM

## 2022-11-10 RX ORDER — CHLORHEXIDINE GLUCONATE 0.12 MG/ML
RINSE ORAL
COMMUNITY
Start: 2022-08-31

## 2022-11-10 RX ORDER — DEXAMETHASONE 4 MG/1
TABLET ORAL
COMMUNITY
Start: 2022-08-31 | End: 2022-11-10

## 2022-11-10 RX ORDER — AMOXICILLIN 500 MG/1
TABLET, FILM COATED ORAL
COMMUNITY
Start: 2022-08-31

## 2022-12-12 ENCOUNTER — TELEPHONE (OUTPATIENT)
Dept: FAMILY MEDICINE CLINIC | Facility: CLINIC | Age: 77
End: 2022-12-12

## 2022-12-12 DIAGNOSIS — U07.1 COVID-19: Primary | ICD-10-CM

## 2022-12-12 NOTE — TELEPHONE ENCOUNTER
Patient called she tested positive for COVID on Friday and is wanting antiviral medication  She uses [facility]

## 2023-01-13 DIAGNOSIS — M54.50 LOW BACK PAIN WITHOUT SCIATICA, UNSPECIFIED BACK PAIN LATERALITY, UNSPECIFIED CHRONICITY: ICD-10-CM

## 2023-01-14 RX ORDER — TRAMADOL HYDROCHLORIDE 50 MG/1
50 TABLET ORAL EVERY 6 HOURS PRN
Qty: 120 TABLET | Refills: 0 | Status: SHIPPED | OUTPATIENT
Start: 2023-01-14

## 2023-02-02 DIAGNOSIS — I10 ESSENTIAL HYPERTENSION: ICD-10-CM

## 2023-02-02 RX ORDER — QUINAPRIL 40 MG/1
TABLET ORAL
Qty: 90 TABLET | Refills: 1 | Status: SHIPPED | OUTPATIENT
Start: 2023-02-02

## 2023-02-08 DIAGNOSIS — G25.81 RESTLESS LEGS SYNDROME (RLS): ICD-10-CM

## 2023-02-08 RX ORDER — PRAMIPEXOLE DIHYDROCHLORIDE 1 MG/1
TABLET ORAL
Qty: 180 TABLET | Refills: 1 | Status: SHIPPED | OUTPATIENT
Start: 2023-02-08

## 2023-03-07 ENCOUNTER — HOSPITAL ENCOUNTER (OUTPATIENT)
Dept: CT IMAGING | Facility: HOSPITAL | Age: 78
Discharge: HOME | End: 2023-03-07

## 2023-03-07 DIAGNOSIS — E83.50 DISORDER OF CALCIUM METABOLISM: ICD-10-CM

## 2023-03-20 ENCOUNTER — TELEPHONE (OUTPATIENT)
Dept: FAMILY MEDICINE CLINIC | Facility: CLINIC | Age: 78
End: 2023-03-20

## 2023-03-20 DIAGNOSIS — I10 PRIMARY HYPERTENSION: Primary | ICD-10-CM

## 2023-03-20 RX ORDER — LISINOPRIL 40 MG/1
40 TABLET ORAL DAILY
Qty: 30 TABLET | Refills: 1 | Status: SHIPPED | OUTPATIENT
Start: 2023-03-20

## 2023-03-20 NOTE — TELEPHONE ENCOUNTER
Pt called and asked if you can send in a replacement for Quinapril  She got notice from the pharmacy that it is not available until the middle of May  She has been without medication since Thursday  Please advise

## 2023-05-03 DIAGNOSIS — E78.2 MIXED HYPERLIPIDEMIA: ICD-10-CM

## 2023-05-03 RX ORDER — ATORVASTATIN CALCIUM 20 MG/1
TABLET, FILM COATED ORAL
Qty: 90 TABLET | Refills: 1 | Status: SHIPPED | OUTPATIENT
Start: 2023-05-03

## 2023-05-09 ENCOUNTER — APPOINTMENT (OUTPATIENT)
Dept: LAB | Facility: CLINIC | Age: 78
End: 2023-05-09

## 2023-05-09 DIAGNOSIS — E78.2 MIXED HYPERLIPIDEMIA: ICD-10-CM

## 2023-05-09 LAB
ALBUMIN SERPL BCP-MCNC: 4.4 G/DL (ref 3.5–5)
ALP SERPL-CCNC: 27 U/L (ref 46–116)
ALT SERPL W P-5'-P-CCNC: 27 U/L (ref 12–78)
ANION GAP SERPL CALCULATED.3IONS-SCNC: 5 MMOL/L (ref 4–13)
AST SERPL W P-5'-P-CCNC: 16 U/L (ref 5–45)
BILIRUB SERPL-MCNC: 0.74 MG/DL (ref 0.2–1)
BUN SERPL-MCNC: 22 MG/DL (ref 5–25)
CALCIUM SERPL-MCNC: 9.6 MG/DL (ref 8.3–10.1)
CHLORIDE SERPL-SCNC: 103 MMOL/L (ref 96–108)
CHOLEST SERPL-MCNC: 136 MG/DL
CO2 SERPL-SCNC: 28 MMOL/L (ref 21–32)
CREAT SERPL-MCNC: 0.83 MG/DL (ref 0.6–1.3)
GFR SERPL CREATININE-BSD FRML MDRD: 69 ML/MIN/1.73SQ M
GLUCOSE P FAST SERPL-MCNC: 91 MG/DL (ref 65–99)
HDLC SERPL-MCNC: 58 MG/DL
LDLC SERPL CALC-MCNC: 59 MG/DL (ref 0–100)
POTASSIUM SERPL-SCNC: 3.6 MMOL/L (ref 3.5–5.3)
PROT SERPL-MCNC: 7.2 G/DL (ref 6.4–8.4)
SODIUM SERPL-SCNC: 136 MMOL/L (ref 135–147)
TRIGL SERPL-MCNC: 93 MG/DL

## 2023-05-10 NOTE — PROGRESS NOTES
Name: Juliette Turner      : 1945      MRN: [id number]  Encounter Provider: Sarah Castro MD  Encounter Date: 2023   Encounter department: [address] [place] [address] Place     1  Encounter for screening mammogram for breast cancer  -     Mammo screening bilateral w 3d & cad; Future; Expected date: 2023    2  Well adult exam    3  Restless legs syndrome (RLS)  Assessment & Plan:  Patient is stable  and will continue present plan of care and reassess at next routine visit  All questions about this problem from patient were answered today  4  Primary hypertension  Assessment & Plan:  Patient is stable with current anti-hypertensive medicine and continue to follow a low sodium diet and take current medication  All questions about this condition were answered today  5  Mixed hyperlipidemia  Assessment & Plan:  Patient  is stable with current medication and we discussed a low fat low cholesterol diet  Weight loss also discussed for this will help lower cholesterol also  Recheck lipids in 6 months  6  Inability to acquire transportation  -     Ambulatory referral to social work care management program; Future; Expected date: 2023    7  Medicare annual wellness visit, subsequent    8  Age-related osteoporosis without current pathological fracture  -     denosumab (PROLIA) subcutaneous injection 60 mg    9  Continuous opioid dependence ([facility] )    10  Current moderate episode of major depressive disorder without prior episode ([facility] )      BMI Counseling: There is no height or weight on file to calculate BMI   The BMI is above normal  Nutrition recommendations include decreasing portion sizes, encouraging healthy choices of fruits and vegetables, decreasing fast food intake, consuming healthier snacks, limiting drinks that contain sugar, moderation in carbohydrate intake, increasing intake of lean protein, reducing intake of saturated and trans fat and reducing intake of cholesterol  Exercise recommendations include exercising 3-5 times per week  No pharmacotherapy was ordered  Patient referred to PCP  Rationale for BMI follow-up plan is due to patient being overweight or obese  Depression Screening and Follow-up Plan: Patient assessed for underlying major depression  Brief counseling provided and recommend additional follow-up/re-evaluation next office visit  Patient advised to follow-up with PCP for further management  Falls Plan of Care: balance, strength, and gait training instructions were provided  Home safety education provided  Urinary Incontinence Plan of Care: counseling topics discussed: practice Kegel (pelvic floor strengthening) exercises, use restroom every 2 hours, limit alcohol, caffeine, spicy foods, and acidic foods, limiting fluid intake 3-4 hours before bed, weight loss, preventing constipation and limiting fluid intake to 60 oz  per day  Subjective     This 68-year-old female today for checkup her medical wellness as well as checkup on multiple medical problems  Patient was restless leg syndrome some healing of hyperlipidemia osteoporosis and she is here for her Prolia shot today  Patient also with some lymphedema and has some chronic venous stasis in her legs she is seeing cardiology who is doing some it sounds like ligation therapies  Patient also stated that she has some mild proteinuria it is really imperative to keep her blood pressure under control and to avoid any kind of nephrotoxic medications  Review of Systems   Constitutional: Negative for activity change, appetite change, fatigue and fever  HENT: Negative for congestion, ear pain, postnasal drip, rhinorrhea, sinus pressure, sinus pain, sneezing and sore throat  Eyes: Negative for pain and redness  Respiratory: Negative for apnea, cough, chest tightness, shortness of breath and wheezing      Cardiovascular: Negative for chest pain, palpitations and leg swelling  Gastrointestinal: Negative for abdominal pain, constipation, diarrhea, nausea and vomiting  Endocrine: Negative for cold intolerance and heat intolerance  Genitourinary: Negative for difficulty urinating, dysuria, frequency, hematuria and urgency  Musculoskeletal: Negative for arthralgias, back pain, gait problem and myalgias  Skin: Negative for rash  Neurological: Negative for dizziness, speech difficulty, weakness, numbness and headaches  Hematological: Does not bruise/bleed easily  Psychiatric/Behavioral: Negative for agitation, confusion and hallucinations         Past Medical History:   Diagnosis Date   • Allergic    • Arthritis    • BBB (bundle branch block)    • Colon polyp    • Current moderate episode of major depressive disorder without prior episode ([facility] ) 04/16/2021   • Current moderate episode of major depressive disorder without prior episode ([facility] ) 04/16/2021   • Headache(784 0) 1961   • Hyperlipidemia    • Hypertension    • Osteoporosis    • RLS (restless legs syndrome)      Past Surgical History:   Procedure Laterality Date   • AUGMENTATION BREAST  1976   • AUGMENTATION MAMMAPLASTY Bilateral 1972   • CATARACT EXTRACTION, BILATERAL     • COLONOSCOPY  09/2015   • COLONOSCOPY     • DILATION AND CURETTAGE OF UTERUS     • HERNIA REPAIR  2013   • NEVUS EXCISION     • SALPINGECTOMY      for endometrosis     Family History   Problem Relation Age of Onset   • Heart disease Family    • Hypertension Family    • Colon cancer Maternal Grandmother [de-identified]   • Arthritis Maternal Grandmother    • Cancer Maternal Grandmother    • Arthritis Mother    • Hearing loss Mother    • Mental illness Mother         Bi-polar   • Bipolar disorder Mother    • Schizophrenia Mother      Social History     Socioeconomic History   • Marital status:      Spouse name: None   • Number of children: None   • Years of education: None   • Highest education level: None   Occupational History   • 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   Tobacco Use   • Smoking status: Never   • Smokeless tobacco: Never   Vaping Use   • Vaping Use: Never used   Substance and Sexual Activity   • Alcohol use: Not Currently     Comment: Rare   • Drug use: Never     Comment: No use   • Sexual activity: Not Currently     Partners: Male     Birth control/protection: Post-menopausal   Other Topics Concern   • None   Social History Narrative    · Most recent tobacco use screenin2019      · Do you currently or have you served in [employer]:   No      · Were you activated, into active duty, as a member of the [employer] or as a Reservist:   No      · 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      · Exercise level: Moderate      · Diet:   Regular      · General stress level:   High      · Alcohol intake:   None      · Caffeine intake: Moderate      · Seat belts used routinely:   Yes      · Sunscreen used routinely:   Yes      · Smoke alarm in home: Yes      · Advance directive:   No      · Has the Patient had a mammogram to screen for breast cancer within 24 months:   Yes      · Live alone or with others:   alone      · Are you currently employed:   Yes      Social Determinants of Health     Financial Resource Strain: Low Risk    • Difficulty of Paying Living Expenses: Not hard at all   Food Insecurity: Not on file   Transportation Needs: Unmet Transportation Needs   • Lack of Transportation (Medical):  No   • Lack of Transportation (Non-Medical): Yes   Physical Activity: Not on file   Stress: Not on file   Social Connections: Not on file   Intimate Partner Violence: Not on file   Housing Stability: Not on file     Current Outpatient Medications on File Prior to Visit   Medication Sig   • albuterol (ProAir HFA) 90 mcg/act inhaler Inhale 2 puffs every 6 (six) hours as needed for wheezing   • atorvastatin (LIPITOR) 20 mg tablet TAKE 1 TABLET(20 MG) BY MOUTH DAILY   • chlorthalidone (HYGROTEN) 50 MG tablet TAKE 1 TABLET(50 MG) BY MOUTH "DAILY   • Cholecalciferol (VITAMIN D3 PO) Take by mouth   • Cyanocobalamin (VITAMIN B12 PO) Take by mouth   • fluticasone-salmeterol (Advair HFA) 115-21 MCG/ACT inhaler Inhale 2 puffs 2 (two) times a day Rinse mouth after use  • GARLIC OIL PO Take by mouth   • halobetasol (ULTRAVATE) 0 05 % cream Apply topically 2 (two) times a day   • lisinopril (ZESTRIL) 40 mg tablet Take 1 tablet (40 mg total) by mouth daily   • Omega-3 Fatty Acids (FISH OIL PO) Take by mouth   • pramipexole (MIRAPEX) 1 mg tablet TAKE 2 TABLETS(2 MG) BY MOUTH IN THE MORNING   • quinapril (ACCUPRIL) 40 MG tablet TAKE 1 TABLET(40 MG) BY MOUTH DAILY   • traMADol (ULTRAM) 50 mg tablet Take 1 tablet (50 mg total) by mouth every 6 (six) hours as needed for moderate pain   • [DISCONTINUED] amoxicillin (AMOXIL) 500 MG tablet TAKE 2 TABLETS BY MOUTH 1 HOUR PRIOR TO SURGERY   • [DISCONTINUED] chlorhexidine (PERIDEX) 0 12 % solution      Allergies   Allergen Reactions   • Sertraline Visual Disturbance     Reaction Date: [id number];    • Ambien [Zolpidem] Other (See Comments)     Was doing things she did not know she was doing     Immunization History   Administered Date(s) Administered   • COVID-19 MODERNA VACC 0 5 ML IM 02/12/2021, 03/11/2021, 11/19/2021, 04/26/2022   • COVID-19 Moderna Vac BIVALENT 12 Yr+ IM (BOOSTER ONLY) 0 5 ML 10/11/2022, 05/09/2023   • H1N1, All Formulations 12/04/2009   • INFLUENZA 09/16/2015, 11/04/2016, 10/21/2017, 11/05/2020, 10/20/2021, 10/11/2022   • Influenza, high dose seasonal 0 7 mL 11/05/2020   • Influenza, seasonal, injectable 10/11/2007, 11/04/2008, 09/01/2009   • Pneumococcal Conjugate 13-Valent 10/05/2016   • Zoster 05/01/2017       Objective     /64 (BP Location: Left arm, Patient Position: Sitting, Cuff Size: Standard)   Pulse 86   Temp 98 5 °F (36 9 °C)   Ht 5' 5\" (1 651 m)   Wt 76 7 kg (169 lb)   SpO2 95%   BMI 28 12 kg/m²     Physical Exam  Vitals and nursing note reviewed     Constitutional:       " "Appearance: She is well-developed  HENT:      Head: Normocephalic and atraumatic  Nose: Nose normal       Mouth/Throat:      Mouth: Mucous membranes are moist    Eyes:      General: No scleral icterus  Conjunctiva/sclera: Conjunctivae normal       Pupils: Pupils are equal, round, and reactive to light  Neck:      Thyroid: No thyromegaly  Cardiovascular:      Rate and Rhythm: Normal rate and regular rhythm  Pulmonary:      Effort: Pulmonary effort is normal       Breath sounds: Normal breath sounds  No wheezing  Abdominal:      General: Bowel sounds are normal  There is no distension  Palpations: Abdomen is soft  Tenderness: There is no abdominal tenderness  There is no guarding or rebound  Musculoskeletal:         General: No tenderness or deformity  Normal range of motion  Cervical back: Normal range of motion and neck supple  Skin:     General: Skin is warm and dry  Findings: No erythema or rash  Neurological:      Mental Status: She is alert and oriented to person, place, and time  Sensory: No sensory deficit  Psychiatric:         Mood and Affect: Mood normal          Behavior: Behavior normal          Thought Content:  Thought content normal          Judgment: Judgment normal        Cam MD Sonido  Answers for HPI/ROS submitted by the patient on 5/10/2023  How would you rate your overall health?: good  Compared to last year, how is your physical health?: slightly worse  In general, how satisfied are you with your life?: satisfied  Compared to last year, how is your eyesight?: same  Compared to last year, how is your hearing?: slightly worse  Compared to last year, how is your emotional/mental health?: same  How often is anger a problem for you?: never, rarely  How often do you feel unusually tired/fatigued?: sometimes  In the past 7 days, how much pain have you experienced?: some  If you answered \"some\" or \"a lot\", please rate the severity of your pain on a " scale of 1 to 10 (1 being the least severe pain and 10 being the most intense pain)  : 6/10  In the past 6 months, have you lost or gained 10 pounds without trying?: Yes  One or more falls in the last year: Yes  In the past 6 months, have you accidentally leaked urine?: Yes  Do you have trouble with the stairs inside or outside your home?: No  Does your home have working smoke alarms?: Yes  Does your home have a carbon monoxide monitor?: Yes  Which safety hazards (if any) have you experienced in your home? Please select all that apply : none  How would you describe your current diet?  Please select all that apply : Regular, No Added Salt  In addition to prescription medications, are you taking any over-the-counter supplements?: Yes  If yes, what supplements are you taking?: Fish oil, Z-98, garlic, D-3  Can you manage your medications?: Yes  Are you currently taking any opioid medications?: Yes  Can you walk and transfer into and out of your bed and chair?: Yes  Can you dress and groom yourself?: Yes  Can you bathe or shower yourself?: Yes  Can you feed yourself?: Yes  Can you do your laundry/ housekeeping?: Yes  Can you manage your money, pay your bills, and track your expenses?: Yes  Can you make your own meals?: Yes  Can you do your own shopping?: Yes  Please list your DME (Durable Medical Equipment) supplier, if you use one : [employer] / compression boots  Within the last 12 months, have you had any hospitalizations or Emergency Department visits?: No  Do you have a living will?: Yes  Do you have a Durable POA (Power of ) for healthcare decisions?: Yes  Do you have an Advanced Directive for end of life decisions?: Yes  How often have you used an illegal drug (including marijuana) or a prescription medication for non-medical reasons in the past year?: never  What is the typical number of drinks you consume in a day?: 0  What is the typical number of drinks you consume in a week?: 0  How often did you have a drink containing alcohol in the past year?: never  How many drinks did you have on a typical day  when you were drinking in the past year?: 0  How often did you have 6 or more drinks on one occasion in the past year?: never

## 2023-05-11 ENCOUNTER — OFFICE VISIT (OUTPATIENT)
Dept: FAMILY MEDICINE CLINIC | Facility: CLINIC | Age: 78
End: 2023-05-11

## 2023-05-11 VITALS
OXYGEN SATURATION: 95 % | TEMPERATURE: 98.5 F | BODY MASS INDEX: 28.16 KG/M2 | WEIGHT: 169 LBS | SYSTOLIC BLOOD PRESSURE: 112 MMHG | HEIGHT: 65 IN | DIASTOLIC BLOOD PRESSURE: 64 MMHG | HEART RATE: 86 BPM

## 2023-05-11 DIAGNOSIS — F32.1 CURRENT MODERATE EPISODE OF MAJOR DEPRESSIVE DISORDER WITHOUT PRIOR EPISODE (HCC): ICD-10-CM

## 2023-05-11 DIAGNOSIS — I10 PRIMARY HYPERTENSION: ICD-10-CM

## 2023-05-11 DIAGNOSIS — Z00.00 MEDICARE ANNUAL WELLNESS VISIT, SUBSEQUENT: ICD-10-CM

## 2023-05-11 DIAGNOSIS — E78.2 MIXED HYPERLIPIDEMIA: ICD-10-CM

## 2023-05-11 DIAGNOSIS — Z59.82 INABILITY TO ACQUIRE TRANSPORTATION: ICD-10-CM

## 2023-05-11 DIAGNOSIS — G25.81 RESTLESS LEGS SYNDROME (RLS): ICD-10-CM

## 2023-05-11 DIAGNOSIS — F11.20 CONTINUOUS OPIOID DEPENDENCE (HCC): ICD-10-CM

## 2023-05-11 DIAGNOSIS — Z00.00 WELL ADULT EXAM: ICD-10-CM

## 2023-05-11 DIAGNOSIS — Z12.31 ENCOUNTER FOR SCREENING MAMMOGRAM FOR BREAST CANCER: Primary | ICD-10-CM

## 2023-05-11 DIAGNOSIS — M81.0 AGE-RELATED OSTEOPOROSIS WITHOUT CURRENT PATHOLOGICAL FRACTURE: ICD-10-CM

## 2023-05-11 SDOH — ECONOMIC STABILITY - TRANSPORTATION SECURITY: TRANSPORTATION INSECURITY: Z59.82

## 2023-05-11 NOTE — PATIENT INSTRUCTIONS
Medicare Preventive Visit Patient Instructions  Thank you for completing your Welcome to Medicare Visit or Medicare Annual Wellness Visit today  Your next wellness visit will be due in one year (5/11/2024)  The screening/preventive services that you may require over the next 5-10 years are detailed below  Some tests may not apply to you based off risk factors and/or age  Screening tests ordered at today's visit but not completed yet may show as past due  Also, please note that scanned in results may not display below  Preventive Screenings:  Service Recommendations Previous Testing/Comments   Colorectal Cancer Screening  * Colonoscopy    * Fecal Occult Blood Test (FOBT)/Fecal Immunochemical Test (FIT)  * Fecal DNA/Cologuard Test  * Flexible Sigmoidoscopy Age: 39-70 years old   Colonoscopy: every 10 years (may be performed more frequently if at higher risk)  OR  FOBT/FIT: every 1 year  OR  Cologuard: every 3 years  OR  Sigmoidoscopy: every 5 years  Screening may be recommended earlier than age 39 if at higher risk for colorectal cancer  Also, an individualized decision between you and your healthcare provider will decide whether screening between the ages of 74-80 would be appropriate  Colonoscopy: 05/07/2022  FOBT/FIT: Not on file  Cologuard: Not on file  Sigmoidoscopy: Not on file    Screening Current     Breast Cancer Screening Age: 36 years old  Frequency: every 1-2 years  Not required if history of left and right mastectomy Mammogram: 05/03/2022    Screening Current   Cervical Cancer Screening Between the ages of 21-29, pap smear recommended once every 3 years  Between the ages of 33-67, can perform pap smear with HPV co-testing every 5 years     Recommendations may differ for women with a history of total hysterectomy, cervical cancer, or abnormal pap smears in past  Pap Smear: Not on file    Screening Not Indicated   Hepatitis C Screening Once for adults born between 1945 and 1965  More frequently in patients at high risk for Hepatitis C Hep C Antibody: 08/24/2021    Screening Current   Diabetes Screening 1-2 times per year if you're at risk for diabetes or have pre-diabetes Fasting glucose: 91 mg/dL (5/9/2023)  A1C: No results in last 5 years (No results in last 5 years)  Screening Current   Cholesterol Screening Once every 5 years if you don't have a lipid disorder  May order more often based on risk factors  Lipid panel: 05/09/2023    Screening Not Indicated  History Lipid Disorder     Other Preventive Screenings Covered by Medicare:  1  Abdominal Aortic Aneurysm (AAA) Screening: covered once if your at risk  You're considered to be at risk if you have a family history of AAA  2  Lung Cancer Screening: covers low dose CT scan once per year if you meet all of the following conditions: (1) Age 50-69; (2) No signs or symptoms of lung cancer; (3) Current smoker or have quit smoking within the last 15 years; (4) You have a tobacco smoking history of at least 20 pack years (packs per day multiplied by number of years you smoked); (5) You get a written order from a healthcare provider  3  Glaucoma Screening: covered annually if you're considered high risk: (1) You have diabetes OR (2) Family history of glaucoma OR (3)  aged 48 and older OR (3)  American aged 72 and older  3  Osteoporosis Screening: covered every 2 years if you meet one of the following conditions: (1) You're estrogen deficient and at risk for osteoporosis based off medical history and other findings; (2) Have a vertebral abnormality; (3) On glucocorticoid therapy for more than 3 months; (4) Have primary hyperparathyroidism; (5) On osteoporosis medications and need to assess response to drug therapy  · Last bone density test (DXA Scan): 07/18/2022   5  HIV Screening: covered annually if you're between the age of 15-65  Also covered annually if you are younger than 13 and older than 72 with risk factors for HIV infection  For pregnant patients, it is covered up to 3 times per pregnancy  Immunizations:  Immunization Recommendations   Influenza Vaccine Annual influenza vaccination during flu season is recommended for all persons aged >= 6 months who do not have contraindications   Pneumococcal Vaccine   * Pneumococcal conjugate vaccine = PCV13 (Prevnar 13), PCV15 (Vaxneuvance), PCV20 (Prevnar 20)  * Pneumococcal polysaccharide vaccine = PPSV23 (Pneumovax) Adults 25-60 years old: 1-3 doses may be recommended based on certain risk factors  Adults 72 years old: 1-2 doses may be recommended based off what pneumonia vaccine you previously received   Hepatitis B Vaccine 3 dose series if at intermediate or high risk (ex: diabetes, end stage renal disease, liver disease)   Tetanus (Td) Vaccine - COST NOT COVERED BY MEDICARE PART B Following completion of primary series, a booster dose should be given every 10 years to maintain immunity against tetanus  Td may also be given as tetanus wound prophylaxis  Tdap Vaccine - COST NOT COVERED BY MEDICARE PART B Recommended at least once for all adults  For pregnant patients, recommended with each pregnancy  Shingles Vaccine (Shingrix) - COST NOT COVERED BY MEDICARE PART B  2 shot series recommended in those aged 48 and above     Health Maintenance Due:      Topic Date Due   • Breast Cancer Screening: Mammogram  05/03/2023   • Colorectal Cancer Screening  05/06/2027   • Hepatitis C Screening  Completed     Immunizations Due:      Topic Date Due   • Pneumococcal Vaccine: 65+ Years (2 - PPSV23 if available, else PCV20) 10/05/2017     Advance Directives   What are advance directives? Advance directives are legal documents that state your wishes and plans for medical care  These plans are made ahead of time in case you lose your ability to make decisions for yourself  Advance directives can apply to any medical decision, such as the treatments you want, and if you want to donate organs     What are the types of advance directives? There are many types of advance directives, and each state has rules about how to use them  You may choose a combination of any of the following:  · Living will: This is a written record of the treatment you want  You can also choose which treatments you do not want, which to limit, and which to stop at a certain time  This includes surgery, medicine, IV fluid, and tube feedings  · Durable power of  for healthcare [facility]): This is a written record that states who you want to make healthcare choices for you when you are unable to make them for yourself  This person, called a proxy, is usually a family member or a friend  You may choose more than 1 proxy  · Do not resuscitate (DNR) order:  A DNR order is used in case your heart stops beating or you stop breathing  It is a request not to have certain forms of treatment, such as CPR  A DNR order may be included in other types of advance directives  · Medical directive: This covers the care that you want if you are in a coma, near death, or unable to make decisions for yourself  You can list the treatments you want for each condition  Treatment may include pain medicine, surgery, blood transfusions, dialysis, IV or tube feedings, and a ventilator (breathing machine)  · Values history: This document has questions about your views, beliefs, and how you feel and think about life  This information can help others choose the care that you would choose  Why are advance directives important? An advance directive helps you control your care  Although spoken wishes may be used, it is better to have your wishes written down  Spoken wishes can be misunderstood, or not followed  Treatments may be given even if you do not want them  An advance directive may make it easier for your family to make difficult choices about your care  Fall Prevention    Fall prevention  includes ways to make your home and other areas safer   It also includes ways you can move more carefully to prevent a fall  Health conditions that cause changes in your blood pressure, vision, or muscle strength and coordination may increase your risk for falls  Medicines may also increase your risk for falls if they make you dizzy, weak, or sleepy  Fall prevention tips:   · Stand or sit up slowly  · Use assistive devices as directed  · Wear shoes that fit well and have soles that   · Wear a personal alarm  · Stay active  · Manage your medical conditions  Home Safety Tips:  · Add items to prevent falls in the bathroom  · Keep paths clear  · Install bright lights in your home  · Keep items you use often on shelves within reach  · Paint or place reflective tape on the edges of your stairs  Urinary Incontinence   Urinary incontinence (UI)  is when you lose control of your bladder  UI develops because your bladder cannot store or empty urine properly  The 3 most common types of UI are stress incontinence, urge incontinence, or both  Medicines:   · May be given to help strengthen your bladder control  Report any side effects of medication to your healthcare provider  Do pelvic muscle exercises often:  Your pelvic muscles help you stop urinating  Squeeze these muscles tight for 5 seconds, then relax for 5 seconds  Gradually work up to squeezing for 10 seconds  Do 3 sets of 15 repetitions a day, or as directed  This will help strengthen your pelvic muscles and improve bladder control  Train your bladder:  Go to the bathroom at set times, such as every 2 hours, even if you do not feel the urge to go  You can also try to hold your urine when you feel the urge to go  For example, hold your urine for 5 minutes when you feel the urge to go  As that becomes easier, hold your urine for 10 minutes  Self-care:   · Keep a UI record  Write down how often you leak urine and how much you leak   Make a note of what you were doing when you leaked urine   · Drink liquids as directed  You may need to limit the amount of liquid you drink to help control your urine leakage  Do not drink any liquid right before you go to bed  Limit or do not have drinks that contain caffeine or alcohol  · Prevent constipation  Eat a variety of high-fiber foods  Good examples are high-fiber cereals, beans, vegetables, and whole-grain breads  Walking is the best way to trigger your intestines to have a bowel movement  · Exercise regularly and maintain a healthy weight  Weight loss and exercise will decrease pressure on your bladder and help you control your leakage  · Use a catheter as directed  to help empty your bladder  A catheter is a tiny, plastic tube that is put into your bladder to drain your urine  · Go to behavior therapy as directed  Behavior therapy may be used to help you learn to control your urge to urinate  Weight Management   Why it is important to manage your weight:  Being overweight increases your risk of health conditions such as heart disease, high blood pressure, type 2 diabetes, and certain types of cancer  It can also increase your risk for osteoarthritis, sleep apnea, and other respiratory problems  Aim for a slow, steady weight loss  Even a small amount of weight loss can lower your risk of health problems  How to lose weight safely:  A safe and healthy way to lose weight is to eat fewer calories and get regular exercise  You can lose up about 1 pound a week by decreasing the number of calories you eat by 500 calories each day  Healthy meal plan for weight management:  A healthy meal plan includes a variety of foods, contains fewer calories, and helps you stay healthy  A healthy meal plan includes the following:  · Eat whole-grain foods more often  A healthy meal plan should contain fiber  Fiber is the part of grains, fruits, and vegetables that is not broken down by your body   Whole-grain foods are healthy and provide extra fiber in your diet  Some examples of whole-grain foods are whole-wheat breads and pastas, oatmeal, brown rice, and bulgur  · Eat a variety of vegetables every day  Include dark, leafy greens such as spinach, kale, michael greens, and mustard greens  Eat yellow and orange vegetables such as carrots, sweet potatoes, and winter squash  · Eat a variety of fruits every day  Choose fresh or canned fruit (canned in its own juice or light syrup) instead of juice  Fruit juice has very little or no fiber  · Eat low-fat dairy foods  Drink fat-free (skim) milk or 1% milk  Eat fat-free yogurt and low-fat cottage cheese  Try low-fat cheeses such as mozzarella and other reduced-fat cheeses  · Choose meat and other protein foods that are low in fat  Choose beans or other legumes such as split peas or lentils  Choose fish, skinless poultry (chicken or turkey), or lean cuts of red meat (beef or pork)  Before you cook meat or poultry, cut off any visible fat  · Use less fat and oil  Try baking foods instead of frying them  Add less fat, such as margarine, sour cream, regular salad dressing and mayonnaise to foods  Eat fewer high-fat foods  Some examples of high-fat foods include french fries, doughnuts, ice cream, and cakes  · Eat fewer sweets  Limit foods and drinks that are high in sugar  This includes candy, cookies, regular soda, and sweetened drinks  Exercise:  Exercise at least 30 minutes per day on most days of the week  Some examples of exercise include walking, biking, dancing, and swimming  You can also fit in more physical activity by taking the stairs instead of the elevator or parking farther away from stores  Ask your healthcare provider about the best exercise plan for you     Narcotic (Opioid) Safety    Use narcotics safely:  · Take prescribed narcotics exactly as directed  · Do not give narcotics to others or take narcotics that belong to someone else  · Do not mix narcotics without medicines or alcohol  · Do not drive or operate heavy machinery after you take the narcotic  · Monitor for side effects and notify your healthcare provider if you experienced side effects such as nausea, sleepiness, itching, or trouble thinking clearly  Manage constipation:    Constipation is the most common side effect of narcotic medicine  Constipation is when you have hard, dry bowel movements, or you go longer than usual between bowel movements  Tell your healthcare provider about all changes in your bowel movements while you are taking narcotics  He or she may recommend laxative medicine to help you have a bowel movement  He or she may also change the kind of narcotic you are taking, or change when you take it  The following are more ways you can prevent or relieve constipation:    · Drink liquids as directed  You may need to drink extra liquids to help soften and move your bowels  Ask how much liquid to drink each day and which liquids are best for you  · Eat high-fiber foods  This may help decrease constipation by adding bulk to your bowel movements  High-fiber foods include fruits, vegetables, whole-grain breads and cereals, and beans  Your healthcare provider or dietitian can help you create a high-fiber meal plan  Your provider may also recommend a fiber supplement if you cannot get enough fiber from food  · Exercise regularly  Regular physical activity can help stimulate your intestines  Walking is a good exercise to prevent or relieve constipation  Ask which exercises are best for you  · Schedule a time each day to have a bowel movement  This may help train your body to have regular bowel movements  Bend forward while you are on the toilet to help move the bowel movement out  Sit on the toilet for at least 10 minutes, even if you do not have a bowel movement  Store narcotics safely:   · Store narcotics where others cannot easily get them  Keep them in a locked cabinet or secure area   Do not  keep them in a purse or other bag you carry with you  A person may be looking for something else and find the narcotics  · Make sure narcotics are stored out of the reach of children  A child can easily overdose on narcotics  Narcotics may look like candy to a small child  The best way to dispose of narcotics: The laws vary by country and area  In the [place], the best way is to return the narcotics through a take-back program  This program is offered by the [employer])  The following are options for using the program:  · Take the narcotics to a REDD collection site  The site is often a law enforcement center  Call your local law enforcement center for scheduled take-back days in your area  You will be given information on where to go if the collection site is in a different location  · Take the narcotics to an approved pharmacy or hospital   A pharmacy or hospital may be set up as a collection site  You will need to ask if it is a REDD collection site if you were not directed there  A pharmacy or doctor's office may not be able to take back narcotics unless it is a REDD site  · Use a mail-back system  This means you are given containers to put the narcotics into  You will then mail them in the containers  · Use a take-back drop box  This is a place to leave the narcotics at any time  People and animals will not be able to get into the box  Your local law enforcement agency can tell you where to find a drop box in your area  Other ways to manage pain:   · Ask your healthcare provider about non-narcotic medicines to control pain  Nonprescription medicines include NSAIDs (such as ibuprofen) and acetaminophen  Prescription medicines include muscle relaxers, antidepressants, and steroids  · Pain may be managed without any medicines  Some ways to relieve pain include massage, aromatherapy, or meditation  Physical or occupational therapy may also help      For more information:   · Drug Enforcement Administration  [address] [place] [address]  [address]  Phone: 4- 445 - [phone]  Web Address: [facility]/drug_disposal/    · [address]  and Drug Administration  Keene Marcellaantoni [place] , [address]  Phone: 2- 619 - [phone]  Web Address: http://[employer]/     © Copyright [employer] 2018 Information is for End User's use only and may not be sold, redistributed or otherwise used for commercial purposes   All illustrations and images included in CareNotes® are the copyrighted property of [employer]  or [address] [place], [place] [address]pe

## 2023-05-11 NOTE — PROGRESS NOTES
Assessment and Plan:     Problem List Items Addressed This Visit        Cardiovascular and Mediastinum    Hypertension     Patient is stable with current anti-hypertensive medicine and continue to follow a low sodium diet and take current medication  All questions about this condition were answered today  Other    Restless legs syndrome (RLS)     Patient is stable  and will continue present plan of care and reassess at next routine visit  All questions about this problem from patient were answered today  Hyperlipidemia     Patient  is stable with current medication and we discussed a low fat low cholesterol diet  Weight loss also discussed for this will help lower cholesterol also  Recheck lipids in 6 months  Current moderate episode of major depressive disorder without prior episode ([facility] )    Continuous opioid dependence ([facility] )   Other Visit Diagnoses     Encounter for screening mammogram for breast cancer    -  Primary    Relevant Orders    Mammo screening bilateral w 3d & cad    Well adult exam        Inability to acquire transportation        Relevant Orders    Ambulatory referral to social work care management program    Medicare annual wellness visit, subsequent        Age-related osteoporosis without current pathological fracture        Relevant Medications    denosumab (PROLIA) subcutaneous injection 60 mg (Start on 5/11/2023  2:30 PM)           Preventive health issues were discussed with patient, and age appropriate screening tests were ordered as noted in patient's After Visit Summary  Personalized health advice and appropriate referrals for health education or preventive services given if needed, as noted in patient's After Visit Summary       History of Present Illness:     Patient presents for a Medicare Wellness Visit    HPI   Patient Care Team:  Herminio Herrera MD as PCP - General (Family Medicine)  Neisha Ledesma MD as PCP - [address] [place] [address] (RTE)     Review of Systems:     Review of Systems     Problem List:     Patient Active Problem List   Diagnosis   • Hypertension   • Restless legs syndrome (RLS)   • Hyperlipidemia   • Current moderate episode of major depressive disorder without prior episode (HCC)   • Abnormal CXR   • Continuous opioid dependence ([facility] )      Past Medical and Surgical History:     Past Medical History:   Diagnosis Date   • Allergic    • Arthritis    • BBB (bundle branch block)    • Colon polyp    • Current moderate episode of major depressive disorder without prior episode ([facility] ) 04/16/2021   • Current moderate episode of major depressive disorder without prior episode ([facility] ) 04/16/2021   • Headache(784 0) 1961   • Hyperlipidemia    • Hypertension    • Osteoporosis    • RLS (restless legs syndrome)      Past Surgical History:   Procedure Laterality Date   • AUGMENTATION BREAST  1976   • AUGMENTATION MAMMAPLASTY Bilateral 1972   • CATARACT EXTRACTION, BILATERAL     • COLONOSCOPY  09/2015   • COLONOSCOPY     • DILATION AND CURETTAGE OF UTERUS     • HERNIA REPAIR  2013   • NEVUS EXCISION     • SALPINGECTOMY      for endometrosis      Family History:     Family History   Problem Relation Age of Onset   • Heart disease Family    • Hypertension Family    • Colon cancer Maternal Grandmother [de-identified]   • Arthritis Maternal Grandmother    • Cancer Maternal Grandmother    • Arthritis Mother    • Hearing loss Mother    • Mental illness Mother         Bi-polar   • Bipolar disorder Mother    • Schizophrenia Mother       Social History:     Social History     Socioeconomic History   • Marital status:      Spouse name: None   • Number of children: None   • Years of education: None   • Highest education level: None   Occupational History   • 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   Tobacco Use   • Smoking status: Never   • Smokeless tobacco: Never   Vaping Use   • Vaping Use: Never used   Substance and Sexual Activity   • Alcohol use: Not Currently     Comment: Rare   • Drug use: Never     Comment: No use   • Sexual activity: Not Currently     Partners: Male     Birth control/protection: Post-menopausal   Other Topics Concern   • None   Social History Narrative    · Most recent tobacco use screenin2019      · Do you currently or have you served in the [employer]:   No      · Were you activated, into active duty, as a member of the [employer] or as a Reservist:   No      · 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      · Exercise level: Moderate      · Diet:   Regular      · General stress level:   High      · Alcohol intake:   None      · Caffeine intake: Moderate      · Seat belts used routinely:   Yes      · Sunscreen used routinely:   Yes      · Smoke alarm in home: Yes      · Advance directive:   No      · Has the Patient had a mammogram to screen for breast cancer within 24 months:   Yes      · Live alone or with others:   alone      · Are you currently employed:   Yes      Social Determinants of Health     Financial Resource Strain: Low Risk    • Difficulty of Paying Living Expenses: Not hard at all   Food Insecurity: Not on file   Transportation Needs: Unmet Transportation Needs   • Lack of Transportation (Medical):  No   • Lack of Transportation (Non-Medical): Yes   Physical Activity: Not on file   Stress: Not on file   Social Connections: Not on file   Intimate Partner Violence: Not on file   Housing Stability: Not on file      Medications and Allergies:     Current Outpatient Medications   Medication Sig Dispense Refill   • albuterol (ProAir HFA) 90 mcg/act inhaler Inhale 2 puffs every 6 (six) hours as needed for wheezing 8 5 g 1   • atorvastatin (LIPITOR) 20 mg tablet TAKE 1 TABLET(20 MG) BY MOUTH DAILY 90 tablet 1   • chlorthalidone (HYGROTEN) 50 MG tablet TAKE 1 TABLET(50 MG) BY MOUTH DAILY 90 tablet 1   • Cholecalciferol (VITAMIN D3 PO) Take by mouth     • Cyanocobalamin (VITAMIN B12 PO) Take by mouth     • fluticasone-salmeterol (Advair HFA) 115-21 MCG/ACT inhaler Inhale 2 puffs 2 (two) times a day Rinse mouth after use   12 g 2   • GARLIC OIL PO Take by mouth     • halobetasol (ULTRAVATE) 0 05 % cream Apply topically 2 (two) times a day 50 g 5   • lisinopril (ZESTRIL) 40 mg tablet Take 1 tablet (40 mg total) by mouth daily 30 tablet 1   • Omega-3 Fatty Acids (FISH OIL PO) Take by mouth     • pramipexole (MIRAPEX) 1 mg tablet TAKE 2 TABLETS(2 MG) BY MOUTH IN THE MORNING 180 tablet 1   • quinapril (ACCUPRIL) 40 MG tablet TAKE 1 TABLET(40 MG) BY MOUTH DAILY 90 tablet 1   • traMADol (ULTRAM) 50 mg tablet Take 1 tablet (50 mg total) by mouth every 6 (six) hours as needed for moderate pain 120 tablet 0     Current Facility-Administered Medications   Medication Dose Route Frequency Provider Last Rate Last Admin   • denosumab (PROLIA) subcutaneous injection 60 mg  60 mg Subcutaneous Once Constantin Castrejon MD         Allergies   Allergen Reactions   • Sertraline Visual Disturbance     Reaction Date: [id number];    • Ambien [Zolpidem] Other (See Comments)     Was doing things she did not know she was doing      Immunizations:     Immunization History   Administered Date(s) Administered   • COVID-19 MODERNA VACC 0 5 ML IM 02/12/2021, 03/11/2021, 11/19/2021, 04/26/2022   • COVID-19 Moderna Vac BIVALENT 12 Yr+ IM (BOOSTER ONLY) 0 5 ML 10/11/2022, 05/09/2023   • H1N1, All Formulations 12/04/2009   • INFLUENZA 09/16/2015, 11/04/2016, 10/21/2017, 11/05/2020, 10/20/2021, 10/11/2022   • Influenza, high dose seasonal 0 7 mL 11/05/2020   • Influenza, seasonal, injectable 10/11/2007, 11/04/2008, 09/01/2009   • Pneumococcal Conjugate 13-Valent 10/05/2016   • Zoster 05/01/2017      Health Maintenance:         Topic Date Due   • Breast Cancer Screening: Mammogram  05/03/2023   • Colorectal Cancer Screening  05/06/2027   • Hepatitis C Screening  Completed         Topic Date Due   • Pneumococcal Vaccine: 65+ Years (2 - PPSV23 if available, else PCV20) 10/05/2017      Medicare Screening Tests and Risk Assessments:     Marissa Bates is here for her Subsequent Wellness visit  Last Medicare Wellness visit information reviewed, patient interviewed and updates made to the record today  Health Risk Assessment:   Patient rates overall health as good  Patient feels that their physical health rating is slightly worse  Patient is satisfied with their life  Eyesight was rated as same  Hearing was rated as slightly worse  Patient feels that their emotional and mental health rating is same  Patients states they are never, rarely angry  Patient states they are sometimes unusually tired/fatigued  Pain experienced in the last 7 days has been some  Patient's pain rating has been 6/10  Patient states that she has experienced weight loss or gain in last 6 months  Fall Risk Screening: In the past year, patient has experienced: history of falling in past year      Urinary Incontinence Screening:   Patient has leaked urine accidently in the last six months  Home Safety:  Patient does not have trouble with stairs inside or outside of their home  Patient has working smoke alarms and has working carbon monoxide detector  Home safety hazards include: none  Nutrition:   Current diet is Regular and No Added Salt  Medications:   Patient is currently taking over-the-counter supplements  OTC medications include: Fish oil, K-12, garlic, D-3  Patient is able to manage medications  Activities of Daily Living (ADLs)/Instrumental Activities of Daily Living (IADLs):   Walk and transfer into and out of bed and chair?: Yes  Dress and groom yourself?: Yes    Bathe or shower yourself?: Yes    Feed yourself?  Yes  Do your laundry/housekeeping?: Yes  Manage your money, pay your bills and track your expenses?: Yes  Make your own meals?: Yes    Do your own shopping?: Yes    Durable Medical Equipment Suppliers  Expect [employer] / compression boots    Previous Hospitalizations:   Any hospitalizations or "ED visits within the last 12 months?: No      Advance Care Planning:   Living will: Yes    Durable POA for healthcare: Yes    Advanced directive: Yes      PREVENTIVE SCREENINGS      Cardiovascular Screening:    General: Screening Not Indicated and History Lipid Disorder      Diabetes Screening:     General: Screening Current      Colorectal Cancer Screening:     General: Screening Current      Breast Cancer Screening:     General: Screening Current      Cervical Cancer Screening:    General: Screening Not Indicated      Osteoporosis Screening:    General: Screening Not Indicated and History Osteoporosis      Lung Cancer Screening:     General: Screening Not Indicated      Hepatitis C Screening:    General: Screening Current    Screening, Brief Intervention, and Referral to Treatment (SBIRT)    Screening  Typical number of drinks in a day: 0  Typical number of drinks in a week: 0  Interpretation: Low risk drinking behavior  AUDIT-C Screenin) How often did you have a drink containing alcohol in the past year? never  2) How many drinks did you have on a typical day when you were drinking in the past year? 0  3) How often did you have 6 or more drinks on one occasion in the past year? never    AUDIT-C Score: 0  Interpretation: Score 0-2 (female): Negative screen for alcohol misuse    Single Item Drug Screening:  How often have you used an illegal drug (including marijuana) or a prescription medication for non-medical reasons in the past year? never    Single Item Drug Screen Score: 0  Interpretation: Negative screen for possible drug use disorder    Review of Current Opioid Use    Opioid Risk Tool (ORT) Interpretation: Complete Opioid Risk Tool (ORT)    No results found       Physical Exam:     /64 (BP Location: Left arm, Patient Position: Sitting, Cuff Size: Standard)   Pulse 86   Temp 98 5 °F (36 9 °C)   Ht 5' 5\" (1 651 m)   Wt 76 7 kg (169 lb)   SpO2 95%   BMI 28 12 kg/m²     Physical Exam " Berta Em MD

## 2023-05-12 ENCOUNTER — PATIENT OUTREACH (OUTPATIENT)
Dept: FAMILY MEDICINE CLINIC | Facility: CLINIC | Age: 78
End: 2023-05-12

## 2023-05-12 NOTE — PROGRESS NOTES
SW received SDOH referral from PCP office for transportation insecurity  Chart review completed  SW placed phone call to patient  Patient was not available at time of phone call  Message left with reason for phone call and this SW contact number  Awaiting return call from patient

## 2023-05-16 DIAGNOSIS — I10 PRIMARY HYPERTENSION: ICD-10-CM

## 2023-05-16 RX ORDER — LISINOPRIL 40 MG/1
TABLET ORAL
Qty: 30 TABLET | Refills: 1 | Status: SHIPPED | OUTPATIENT
Start: 2023-05-16 | End: 2023-05-16

## 2023-05-16 RX ORDER — LISINOPRIL 40 MG/1
TABLET ORAL
Qty: 90 TABLET | Refills: 1 | Status: SHIPPED | OUTPATIENT
Start: 2023-05-16

## 2023-05-24 ENCOUNTER — PATIENT OUTREACH (OUTPATIENT)
Dept: FAMILY MEDICINE CLINIC | Facility: CLINIC | Age: 78
End: 2023-05-24

## 2023-05-24 NOTE — PROGRESS NOTES
SW placed 2nd [facility] outreach to patient to discuss transportation needs  Patient was not available at time of phone call  Message left with reason for phone call and this SW contact number  Unable to reach letter sent through My Chart  SW will close this referral due to not being able to connect with patient  SW will remain available

## 2023-05-24 NOTE — LETTER
[address] [facility] [id number] [phone]      Re: Transportation     5/24/2023       Dear Jamar Davis,    I have tried to reach you by phone and was unfortunately unable to reach you  If you continue to need assistance with transportation or any other resources, please do not hesitate to reach out to me at [phone]  Thank you      Sincerely,         Ambika Police, LCSW

## 2023-06-01 DIAGNOSIS — M54.50 LOW BACK PAIN WITHOUT SCIATICA, UNSPECIFIED BACK PAIN LATERALITY, UNSPECIFIED CHRONICITY: ICD-10-CM

## 2023-06-01 RX ORDER — TRAMADOL HYDROCHLORIDE 50 MG/1
TABLET ORAL
Qty: 120 TABLET | Refills: 0 | Status: SHIPPED | OUTPATIENT
Start: 2023-06-01

## 2023-08-17 ENCOUNTER — RA CDI HCC (OUTPATIENT)
Dept: OTHER | Facility: HOSPITAL | Age: 78
End: 2023-08-17

## 2023-08-17 NOTE — PROGRESS NOTES
[address] [facility] coding opportunities       Chart reviewed, no opportunity found: CHART REVIEWED, NO OPPORTUNITY FOUND        Patients Insurance     Medicare Insurance: [employer]

## 2023-08-24 ENCOUNTER — OFFICE VISIT (OUTPATIENT)
Dept: FAMILY MEDICINE CLINIC | Facility: CLINIC | Age: 78
End: 2023-08-24
Payer: COMMERCIAL

## 2023-08-24 VITALS
BODY MASS INDEX: 28.49 KG/M2 | RESPIRATION RATE: 18 BRPM | WEIGHT: 171 LBS | HEIGHT: 65 IN | DIASTOLIC BLOOD PRESSURE: 70 MMHG | OXYGEN SATURATION: 97 % | HEART RATE: 74 BPM | SYSTOLIC BLOOD PRESSURE: 124 MMHG | TEMPERATURE: 98.7 F

## 2023-08-24 DIAGNOSIS — M81.0 AGE-RELATED OSTEOPOROSIS WITHOUT CURRENT PATHOLOGICAL FRACTURE: ICD-10-CM

## 2023-08-24 DIAGNOSIS — F11.20 CONTINUOUS OPIOID DEPENDENCE (HCC): ICD-10-CM

## 2023-08-24 DIAGNOSIS — I10 PRIMARY HYPERTENSION: ICD-10-CM

## 2023-08-24 DIAGNOSIS — F32.1 CURRENT MODERATE EPISODE OF MAJOR DEPRESSIVE DISORDER WITHOUT PRIOR EPISODE (HCC): Primary | ICD-10-CM

## 2023-08-24 DIAGNOSIS — G25.81 RESTLESS LEGS SYNDROME (RLS): ICD-10-CM

## 2023-08-24 DIAGNOSIS — E78.2 MIXED HYPERLIPIDEMIA: ICD-10-CM

## 2023-08-24 PROCEDURE — 99214 OFFICE O/P EST MOD 30 MIN: CPT | Performed by: FAMILY MEDICINE

## 2023-08-24 RX ORDER — PRAMIPEXOLE DIHYDROCHLORIDE 1 MG/1
1 TABLET ORAL
Qty: 180 TABLET | Refills: 1 | Status: SHIPPED | OUTPATIENT
Start: 2023-08-24

## 2023-08-24 NOTE — ASSESSMENT & PLAN NOTE
Patient to continue with weight bearing exercises as much as possible and oral intake of 1200 mg of calcium with 800 IU of Vit D to maximize bone protection. Pt  to continue  with DEXA scan every 2 years to reassess. All qustions regarding this problem answered today to patient satisfaction.

## 2023-09-05 DIAGNOSIS — M54.50 LOW BACK PAIN WITHOUT SCIATICA, UNSPECIFIED BACK PAIN LATERALITY, UNSPECIFIED CHRONICITY: ICD-10-CM

## 2023-09-05 RX ORDER — TRAMADOL HYDROCHLORIDE 50 MG/1
TABLET ORAL
Qty: 120 TABLET | Refills: 0 | Status: SHIPPED | OUTPATIENT
Start: 2023-09-05

## 2023-10-08 DIAGNOSIS — I10 PRIMARY HYPERTENSION: ICD-10-CM

## 2023-10-09 RX ORDER — CHLORTHALIDONE 50 MG/1
TABLET ORAL
Qty: 90 TABLET | Refills: 1 | Status: SHIPPED | OUTPATIENT
Start: 2023-10-09

## 2023-10-30 DIAGNOSIS — E78.2 MIXED HYPERLIPIDEMIA: ICD-10-CM

## 2023-10-30 RX ORDER — ATORVASTATIN CALCIUM 20 MG/1
TABLET, FILM COATED ORAL
Qty: 90 TABLET | Refills: 1 | Status: SHIPPED | OUTPATIENT
Start: 2023-10-30

## 2023-11-07 ENCOUNTER — APPOINTMENT (OUTPATIENT)
Dept: LAB | Facility: CLINIC | Age: 78
End: 2023-11-07
Payer: COMMERCIAL

## 2023-11-07 DIAGNOSIS — E78.2 MIXED HYPERLIPIDEMIA: ICD-10-CM

## 2023-11-07 DIAGNOSIS — I10 PRIMARY HYPERTENSION: ICD-10-CM

## 2023-11-07 DIAGNOSIS — I10 ESSENTIAL HYPERTENSION, MALIGNANT: Primary | ICD-10-CM

## 2023-11-07 LAB
ALBUMIN SERPL BCP-MCNC: 4.3 G/DL (ref 3.5–5)
ALP SERPL-CCNC: 25 U/L (ref 34–104)
ALT SERPL W P-5'-P-CCNC: 23 U/L (ref 7–52)
ANION GAP SERPL CALCULATED.3IONS-SCNC: 10 MMOL/L
AST SERPL W P-5'-P-CCNC: 16 U/L (ref 13–39)
BACTERIA UR QL AUTO: ABNORMAL /HPF
BASOPHILS # BLD AUTO: 0.04 THOUSANDS/ÂΜL (ref 0–0.1)
BASOPHILS NFR BLD AUTO: 1 % (ref 0–1)
BILIRUB SERPL-MCNC: 0.8 MG/DL (ref 0.2–1)
BILIRUB UR QL STRIP: NEGATIVE
BUN SERPL-MCNC: 27 MG/DL (ref 5–25)
CALCIUM SERPL-MCNC: 9.4 MG/DL (ref 8.4–10.2)
CHLORIDE SERPL-SCNC: 101 MMOL/L (ref 96–108)
CHOLEST SERPL-MCNC: 143 MG/DL
CLARITY UR: CLEAR
CO2 SERPL-SCNC: 27 MMOL/L (ref 21–32)
COLOR UR: ABNORMAL
CREAT SERPL-MCNC: 0.83 MG/DL (ref 0.6–1.3)
EOSINOPHIL # BLD AUTO: 0.45 THOUSAND/ÂΜL (ref 0–0.61)
EOSINOPHIL NFR BLD AUTO: 6 % (ref 0–6)
ERYTHROCYTE [DISTWIDTH] IN BLOOD BY AUTOMATED COUNT: 12 % (ref 11.6–15.1)
GFR SERPL CREATININE-BSD FRML MDRD: 69 ML/MIN/1.73SQ M
GLUCOSE P FAST SERPL-MCNC: 94 MG/DL (ref 65–99)
GLUCOSE UR STRIP-MCNC: NEGATIVE MG/DL
HCT VFR BLD AUTO: 33.8 % (ref 34.8–46.1)
HDLC SERPL-MCNC: 53 MG/DL
HGB BLD-MCNC: 11.7 G/DL (ref 11.5–15.4)
HGB UR QL STRIP.AUTO: NEGATIVE
IMM GRANULOCYTES # BLD AUTO: 0.02 THOUSAND/UL (ref 0–0.2)
IMM GRANULOCYTES NFR BLD AUTO: 0 % (ref 0–2)
KETONES UR STRIP-MCNC: NEGATIVE MG/DL
LDLC SERPL CALC-MCNC: 68 MG/DL (ref 0–100)
LEUKOCYTE ESTERASE UR QL STRIP: ABNORMAL
LYMPHOCYTES # BLD AUTO: 1.39 THOUSANDS/ÂΜL (ref 0.6–4.47)
LYMPHOCYTES NFR BLD AUTO: 19 % (ref 14–44)
MAGNESIUM SERPL-MCNC: 2 MG/DL (ref 1.9–2.7)
MCH RBC QN AUTO: 32.7 PG (ref 26.8–34.3)
MCHC RBC AUTO-ENTMCNC: 34.6 G/DL (ref 31.4–37.4)
MCV RBC AUTO: 94 FL (ref 82–98)
MONOCYTES # BLD AUTO: 0.51 THOUSAND/ÂΜL (ref 0.17–1.22)
MONOCYTES NFR BLD AUTO: 7 % (ref 4–12)
NEUTROPHILS # BLD AUTO: 4.74 THOUSANDS/ÂΜL (ref 1.85–7.62)
NEUTS SEG NFR BLD AUTO: 67 % (ref 43–75)
NITRITE UR QL STRIP: NEGATIVE
NON-SQ EPI CELLS URNS QL MICRO: ABNORMAL /HPF
NRBC BLD AUTO-RTO: 0 /100 WBCS
PH UR STRIP.AUTO: 6.5 [PH]
PLATELET # BLD AUTO: 306 THOUSANDS/UL (ref 149–390)
PMV BLD AUTO: 9.3 FL (ref 8.9–12.7)
POTASSIUM SERPL-SCNC: 4.1 MMOL/L (ref 3.5–5.3)
PROT SERPL-MCNC: 7.1 G/DL (ref 6.4–8.4)
PROT UR STRIP-MCNC: NEGATIVE MG/DL
RBC # BLD AUTO: 3.58 MILLION/UL (ref 3.81–5.12)
RBC #/AREA URNS AUTO: ABNORMAL /HPF
SODIUM SERPL-SCNC: 138 MMOL/L (ref 135–147)
SP GR UR STRIP.AUTO: 1.01 (ref 1–1.03)
TRANS CELLS #/AREA URNS HPF: PRESENT /[HPF]
TRIGL SERPL-MCNC: 111 MG/DL
TSH SERPL DL<=0.05 MIU/L-ACNC: 1.01 UIU/ML (ref 0.45–4.5)
URATE SERPL-MCNC: 6.6 MG/DL (ref 2–7.5)
UROBILINOGEN UR STRIP-ACNC: <2 MG/DL
WBC # BLD AUTO: 7.15 THOUSAND/UL (ref 4.31–10.16)
WBC #/AREA URNS AUTO: ABNORMAL /HPF

## 2023-11-07 PROCEDURE — 80061 LIPID PANEL: CPT

## 2023-11-07 PROCEDURE — 84443 ASSAY THYROID STIM HORMONE: CPT

## 2023-11-07 PROCEDURE — 84550 ASSAY OF BLOOD/URIC ACID: CPT

## 2023-11-07 PROCEDURE — 36415 COLL VENOUS BLD VENIPUNCTURE: CPT

## 2023-11-07 PROCEDURE — 85025 COMPLETE CBC W/AUTO DIFF WBC: CPT

## 2023-11-07 PROCEDURE — 83735 ASSAY OF MAGNESIUM: CPT

## 2023-11-07 PROCEDURE — 80053 COMPREHEN METABOLIC PANEL: CPT

## 2023-11-07 PROCEDURE — 81001 URINALYSIS AUTO W/SCOPE: CPT

## 2023-11-08 DIAGNOSIS — I10 PRIMARY HYPERTENSION: ICD-10-CM

## 2023-11-08 RX ORDER — LISINOPRIL 40 MG/1
TABLET ORAL
Qty: 90 TABLET | Refills: 1 | Status: SHIPPED | OUTPATIENT
Start: 2023-11-08

## 2023-11-12 NOTE — PROGRESS NOTES
Name: Shira Salazar      : 1945      MRN: [id number]  Encounter Provider: Yoon Villagomez MD  Encounter Date: 2023   Encounter department: [address]     1. Age-related osteoporosis without current pathological fracture  Assessment & Plan:  Patient to continue with weight bearing exercises as much as possible and oral intake of 1200 mg of calcium with 800 IU of Vit D to maximize bone protection. Pt  to continue  with DEXA scan every 2 years to reassess. All qustions regarding this problem answered today to patient satisfaction. Orders:  -     denosumab (PROLIA) subcutaneous injection 60 mg    2. Continuous opioid dependence [facility])  Assessment & Plan:  Patient is stable  and will continue present plan of care and reassess at next routine visit. All questions about this problem from patient were answered today. 3. Mixed hyperlipidemia  Assessment & Plan:  Patient  is stable with current medication and we discussed a low fat low cholesterol diet. Weight loss also discussed for this will help lower cholesterol also. Recheck lipids in 6 months. 4. Current moderate episode of major depressive disorder without prior episode [facility])  Assessment & Plan:  Patient to continue utilizing medical therapy as well and counseling sources as applicable for condition. If  suicidal thought or fear of suicide to contact 911 and seek help immediately. Meds reviewed and patient questions answered today       5. Primary hypertension  Assessment & Plan:  Patient is stable with current anti-hypertensive medicine and continue to follow a low sodium diet and take current medication. All questions about this condition were answered today. 6. Restless legs syndrome (RLS)  Assessment & Plan:  Patient is stable  and will continue present plan of care and reassess at next routine visit. All questions about this problem from patient were answered today.        7. Mammogram abnormal  -     Mammo screening bilateral w 3d & cad; Future; Expected date: 11/13/2023  -     US breast left limited (diagnostic); Future; Expected date: 11/13/2023  -     US breast right limited (diagnostic); Future; Expected date: 11/13/2023    8. Moderate persistent asthma without complication  -     fluticasone-salmeterol (Advair HFA) 115-21 MCG/ACT inhaler; Inhale 2 puffs 2 (two) times a day Rinse mouth after use. Depression Screening and Follow-up Plan: Patient assessed for underlying major depression. Brief counseling provided and recommend additional follow-up/re-evaluation next office visit. Patient advised to follow-up with PCP for further management. Falls Plan of Care: balance, strength, and gait training instructions were provided. Home safety education provided. Urinary Incontinence Plan of Care: counseling topics discussed: practice Kegel (pelvic floor strengthening) exercises, use restroom every 2 hours, limit alcohol, caffeine, spicy foods, and acidic foods, limiting fluid intake 3-4 hours before bed, weight loss, preventing constipation and limiting fluid intake to 60 oz. per day. Subjective     68-year-old female referred here today for checkup on multimedical problems patient with hypertension history of some asthma osteoporosis hyperlipidemia and is actually doing very well. Patient needs refills on her mammogram and her ultrasounds and also Advair refill today. She also had lab work done which reviewed and she is doing fantastic. Review of Systems   Constitutional:  Negative for activity change, appetite change, fatigue and fever. HENT:  Negative for congestion, ear pain, postnasal drip, rhinorrhea, sinus pressure, sinus pain, sneezing and sore throat. Eyes:  Negative for pain and redness. Respiratory:  Negative for apnea, cough, chest tightness, shortness of breath and wheezing.     Cardiovascular:  Negative for chest pain, palpitations and leg swelling. Gastrointestinal:  Negative for abdominal pain, constipation, diarrhea, nausea and vomiting. Endocrine: Negative for cold intolerance and heat intolerance. Genitourinary:  Negative for difficulty urinating, dysuria, frequency, hematuria and urgency. Musculoskeletal:  Negative for arthralgias, back pain, gait problem and myalgias. Skin:  Negative for rash. Neurological:  Negative for dizziness, speech difficulty, weakness, numbness and headaches. Hematological:  Does not bruise/bleed easily. Psychiatric/Behavioral:  Negative for agitation, confusion and hallucinations.         Past Medical History:   Diagnosis Date   • Allergic    • Arthritis    • BBB (bundle branch block)    • Colon polyp    • Current moderate episode of major depressive disorder without prior episode ([address]) 04/16/2021   • Current moderate episode of major depressive disorder without prior episode ([address]) 04/16/2021   • Headache(784.0) 1961   • Hyperlipidemia    • Hypertension    • Osteoporosis    • RLS (restless legs syndrome)      Past Surgical History:   Procedure Laterality Date   • AUGMENTATION BREAST  1976   • AUGMENTATION MAMMAPLASTY Bilateral 1972   • CATARACT EXTRACTION, BILATERAL     • COLONOSCOPY  09/2015   • COLONOSCOPY     • DILATION AND CURETTAGE OF UTERUS     • HERNIA REPAIR  2013   • NEVUS EXCISION     • SALPINGECTOMY      for endometrosis     Family History   Problem Relation Age of Onset   • Heart disease Family    • Hypertension Family    • Colon cancer Maternal Grandmother 80   • Arthritis Maternal Grandmother    • Cancer Maternal Grandmother    • Arthritis Mother    • Hearing loss Mother    • Mental illness Mother         Bi-polar   • Bipolar disorder Mother    • Schizophrenia Mother      Social History     Socioeconomic History   • Marital status:      Spouse name: None   • Number of children: None   • Years of education: None   • Highest education level: None   Occupational History   • 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   Tobacco Use   • Smoking status: Never   • Smokeless tobacco: Never   Vaping Use   • Vaping Use: Never used   Substance and Sexual Activity   • Alcohol use: Not Currently     Comment: Rare   • Drug use: Never     Comment: No use   • Sexual activity: Not Currently     Partners: Male     Birth control/protection: Post-menopausal   Other Topics Concern   • None   Social History Narrative    · Most recent tobacco use screenin2019      · Do you currently or have you served in the [address] [place] [address] [place]:   No      · Were you activated, into active duty, as a member of the [employer] or as a Reservist:   No      · 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      · Exercise level: Moderate      · Diet:   Regular      · General stress level:   High      · Alcohol intake:   None      · Caffeine intake: Moderate      · Seat belts used routinely:   Yes      · Sunscreen used routinely:   Yes      · Smoke alarm in home: Yes      · Advance directive:   No      · Has the Patient had a mammogram to screen for breast cancer within 24 months:   Yes      · Live alone or with others:   alone      · Are you currently employed:   Yes      Social Determinants of Health     Financial Resource Strain: Low Risk  (5/10/2023)    Overall Financial Resource Strain (CARDIA)    • Difficulty of Paying Living Expenses: Not hard at all   Food Insecurity: Not on file   Transportation Needs: Unmet Transportation Needs (5/10/2023)    PRAPARE - Transportation    • Lack of Transportation (Medical):  No    • Lack of Transportation (Non-Medical): Yes   Physical Activity: Not on file   Stress: Not on file   Social Connections: Not on file   Intimate Partner Violence: Not on file   Housing Stability: Not on file     Current Outpatient Medications on File Prior to Visit   Medication Sig   • albuterol (ProAir HFA) 90 mcg/act inhaler Inhale 2 puffs every 6 (six) hours as needed for wheezing   • atorvastatin (LIPITOR) 20 mg tablet TAKE 1 TABLET(20 MG) BY MOUTH DAILY   • chlorthalidone (HYGROTEN) 50 MG tablet TAKE 1 TABLET(50 MG) BY MOUTH DAILY   • Cholecalciferol (VITAMIN D3 PO) Take by mouth   • Cyanocobalamin (VITAMIN B12 PO) Take by mouth   • GARLIC OIL PO Take by mouth   • halobetasol (ULTRAVATE) 0.05 % cream Apply topically 2 (two) times a day   • lisinopril (ZESTRIL) 40 mg tablet TAKE 1 TABLET(40 MG) BY MOUTH DAILY   • Omega-3 Fatty Acids (FISH OIL PO) Take by mouth   • pramipexole (MIRAPEX) 1 mg tablet Take 1 tablet (1 mg total) by mouth 2 (two) times a day   • traMADol (ULTRAM) 50 mg tablet TAKE 1 TABLET(50 MG) BY MOUTH EVERY 6 HOURS AS NEEDED FOR MODERATE PAIN   • [DISCONTINUED] fluticasone-salmeterol (Advair HFA) 115-21 MCG/ACT inhaler Inhale 2 puffs 2 (two) times a day Rinse mouth after use. Allergies   Allergen Reactions   • Sertraline Visual Disturbance     Reaction Date: [id number];    • Ambien [Zolpidem] Other (See Comments)     Was doing things she did not know she was doing     Immunization History   Administered Date(s) Administered   • COVID-19 MODERNA VACC 0.5 ML IM 02/12/2021, 03/11/2021, 11/19/2021, 04/26/2022   • COVID-19 Moderna Vac BIVALENT 12 Yr+ IM 0.5 ML 10/11/2022, 05/09/2023   • H1N1, All Formulations 12/04/2009   • INFLUENZA 09/16/2015, 11/04/2016, 10/21/2017, 11/05/2020, 10/20/2021, 10/11/2022   • Influenza, high dose seasonal 0.7 mL 11/05/2020   • Influenza, seasonal, injectable 10/11/2007, 11/04/2008, 09/01/2009   • Pneumococcal Conjugate 13-Valent 10/05/2016   • Zoster 05/01/2017       Objective     /74 (BP Location: Left arm, Patient Position: Sitting, Cuff Size: Standard)   Pulse 78   Temp 97.7 °F (36.5 °C)   Resp 18   Ht 5' 5" (1.651 m)   Wt 78.2 kg (172 lb 4.8 oz)   SpO2 97%   BMI 28.67 kg/m²     Physical Exam  Vitals and nursing note reviewed. Constitutional:       Appearance: She is well-developed. HENT:      Head: Normocephalic and atraumatic.       Nose: Nose normal.      Mouth/Throat: Mouth: Mucous membranes are moist.   Eyes:      General: No scleral icterus. Conjunctiva/sclera: Conjunctivae normal.      Pupils: Pupils are equal, round, and reactive to light. Neck:      Thyroid: No thyromegaly. Cardiovascular:      Rate and Rhythm: Normal rate and regular rhythm. Pulmonary:      Effort: Pulmonary effort is normal.      Breath sounds: Normal breath sounds. No wheezing. Abdominal:      General: Bowel sounds are normal. There is no distension. Palpations: Abdomen is soft. Tenderness: There is no abdominal tenderness. There is no guarding or rebound. Musculoskeletal:         General: No tenderness or deformity. Normal range of motion. Cervical back: Normal range of motion and neck supple. Skin:     General: Skin is warm and dry. Findings: No erythema or rash. Neurological:      Mental Status: She is alert and oriented to person, place, and time. Sensory: No sensory deficit. Psychiatric:         Mood and Affect: Mood normal.         Behavior: Behavior normal.         Thought Content:  Thought content normal.         Judgment: Judgment normal.       Mendel Macias MD

## 2023-11-13 ENCOUNTER — OFFICE VISIT (OUTPATIENT)
Dept: FAMILY MEDICINE CLINIC | Facility: CLINIC | Age: 78
End: 2023-11-13
Payer: COMMERCIAL

## 2023-11-13 VITALS
HEIGHT: 65 IN | BODY MASS INDEX: 28.71 KG/M2 | WEIGHT: 172.3 LBS | OXYGEN SATURATION: 97 % | TEMPERATURE: 97.7 F | DIASTOLIC BLOOD PRESSURE: 74 MMHG | RESPIRATION RATE: 18 BRPM | HEART RATE: 78 BPM | SYSTOLIC BLOOD PRESSURE: 124 MMHG

## 2023-11-13 DIAGNOSIS — F32.1 CURRENT MODERATE EPISODE OF MAJOR DEPRESSIVE DISORDER WITHOUT PRIOR EPISODE (HCC): ICD-10-CM

## 2023-11-13 DIAGNOSIS — I10 PRIMARY HYPERTENSION: ICD-10-CM

## 2023-11-13 DIAGNOSIS — M81.0 AGE-RELATED OSTEOPOROSIS WITHOUT CURRENT PATHOLOGICAL FRACTURE: Primary | ICD-10-CM

## 2023-11-13 DIAGNOSIS — G25.81 RESTLESS LEGS SYNDROME (RLS): ICD-10-CM

## 2023-11-13 DIAGNOSIS — E78.2 MIXED HYPERLIPIDEMIA: ICD-10-CM

## 2023-11-13 DIAGNOSIS — R92.8 MAMMOGRAM ABNORMAL: ICD-10-CM

## 2023-11-13 DIAGNOSIS — J45.40 MODERATE PERSISTENT ASTHMA WITHOUT COMPLICATION: ICD-10-CM

## 2023-11-13 DIAGNOSIS — F11.20 CONTINUOUS OPIOID DEPENDENCE (HCC): ICD-10-CM

## 2023-11-13 PROCEDURE — 96372 THER/PROPH/DIAG INJ SC/IM: CPT | Performed by: FAMILY MEDICINE

## 2023-11-13 PROCEDURE — 99214 OFFICE O/P EST MOD 30 MIN: CPT | Performed by: FAMILY MEDICINE

## 2023-11-13 RX ORDER — FLUTICASONE PROPIONATE AND SALMETEROL XINAFOATE 115; 21 UG/1; UG/1
2 AEROSOL, METERED RESPIRATORY (INHALATION) 2 TIMES DAILY
Qty: 12 G | Refills: 2 | Status: SHIPPED | OUTPATIENT
Start: 2023-11-13

## 2024-03-27 DIAGNOSIS — I10 PRIMARY HYPERTENSION: ICD-10-CM

## 2024-03-27 RX ORDER — CHLORTHALIDONE 50 MG/1
TABLET ORAL
Qty: 90 TABLET | Refills: 1 | Status: SHIPPED | OUTPATIENT
Start: 2024-03-27

## 2024-05-05 DIAGNOSIS — G25.81 RESTLESS LEGS SYNDROME (RLS): ICD-10-CM

## 2024-05-05 DIAGNOSIS — E78.2 MIXED HYPERLIPIDEMIA: ICD-10-CM

## 2024-05-05 DIAGNOSIS — I10 PRIMARY HYPERTENSION: ICD-10-CM

## 2024-05-05 RX ORDER — LISINOPRIL 40 MG/1
TABLET ORAL
Qty: 90 TABLET | Refills: 1 | Status: SHIPPED | OUTPATIENT
Start: 2024-05-05

## 2024-05-05 RX ORDER — PRAMIPEXOLE DIHYDROCHLORIDE 1 MG/1
TABLET ORAL
Qty: 180 TABLET | Refills: 1 | Status: SHIPPED | OUTPATIENT
Start: 2024-05-05

## 2024-05-05 RX ORDER — ATORVASTATIN CALCIUM 20 MG/1
TABLET, FILM COATED ORAL
Qty: 90 TABLET | Refills: 1 | Status: SHIPPED | OUTPATIENT
Start: 2024-05-05

## 2024-05-07 ENCOUNTER — RA CDI HCC (OUTPATIENT)
Dept: OTHER | Facility: HOSPITAL | Age: 79
End: 2024-05-07

## 2024-05-15 ENCOUNTER — OFFICE VISIT (OUTPATIENT)
Dept: FAMILY MEDICINE CLINIC | Facility: CLINIC | Age: 79
End: 2024-05-15
Payer: COMMERCIAL

## 2024-05-15 VITALS
OXYGEN SATURATION: 96 % | TEMPERATURE: 97.7 F | HEIGHT: 65 IN | HEART RATE: 90 BPM | WEIGHT: 172 LBS | DIASTOLIC BLOOD PRESSURE: 64 MMHG | BODY MASS INDEX: 28.66 KG/M2 | SYSTOLIC BLOOD PRESSURE: 128 MMHG

## 2024-05-15 DIAGNOSIS — G25.81 RESTLESS LEGS SYNDROME (RLS): ICD-10-CM

## 2024-05-15 DIAGNOSIS — J30.1 SEASONAL ALLERGIC RHINITIS DUE TO POLLEN: ICD-10-CM

## 2024-05-15 DIAGNOSIS — F32.1 CURRENT MODERATE EPISODE OF MAJOR DEPRESSIVE DISORDER WITHOUT PRIOR EPISODE (HCC): ICD-10-CM

## 2024-05-15 DIAGNOSIS — E78.2 MIXED HYPERLIPIDEMIA: ICD-10-CM

## 2024-05-15 DIAGNOSIS — F11.20 CONTINUOUS OPIOID DEPENDENCE (HCC): ICD-10-CM

## 2024-05-15 DIAGNOSIS — Z12.5 SCREENING FOR MALIGNANT NEOPLASM OF PROSTATE: ICD-10-CM

## 2024-05-15 DIAGNOSIS — I10 PRIMARY HYPERTENSION: ICD-10-CM

## 2024-05-15 DIAGNOSIS — M81.0 AGE-RELATED OSTEOPOROSIS WITHOUT CURRENT PATHOLOGICAL FRACTURE: ICD-10-CM

## 2024-05-15 DIAGNOSIS — Z00.00 WELL ADULT EXAM: Primary | ICD-10-CM

## 2024-05-15 PROCEDURE — 96372 THER/PROPH/DIAG INJ SC/IM: CPT | Performed by: FAMILY MEDICINE

## 2024-05-15 PROCEDURE — G0439 PPPS, SUBSEQ VISIT: HCPCS | Performed by: FAMILY MEDICINE

## 2024-05-15 PROCEDURE — 99214 OFFICE O/P EST MOD 30 MIN: CPT | Performed by: FAMILY MEDICINE

## 2024-05-15 RX ORDER — MONTELUKAST SODIUM 10 MG/1
10 TABLET ORAL
Qty: 30 TABLET | Refills: 5 | Status: SHIPPED | OUTPATIENT
Start: 2024-05-15 | End: 2024-05-16

## 2024-05-15 RX ORDER — CICLOPIROX 80 MG/ML
SOLUTION TOPICAL
COMMUNITY

## 2024-05-15 RX ORDER — FLUTICASONE PROPIONATE 50 MCG
1 SPRAY, SUSPENSION (ML) NASAL DAILY
Qty: 9.9 ML | Refills: 1 | Status: SHIPPED | OUTPATIENT
Start: 2024-05-15

## 2024-05-15 NOTE — PROGRESS NOTES
Ambulatory Visit  Name: Gale Mueller      : 1945      MRN: [id number]  Encounter Provider: Jameson Frank MD  Encounter Date: 5/15/2024   Encounter department: [facility]    Assessment & Plan   1. Well adult exam  2. Age-related osteoporosis without current pathological fracture  Assessment & Plan:  Patient to continue with weight bearing exercises as much as possible and oral intake of 1200 mg of calcium with 800 IU of Vit D to maximize bone protection. Pt  to continue  with DEXA scan every 2 years to reassess. All qustions regarding this problem answered today to patient satisfaction.   Orders:  -     denosumab (PROLIA) subcutaneous injection 60 mg  3. Current moderate episode of major depressive disorder without prior episode (HCC)  Assessment & Plan:  Patient to continue utilizing medical therapy as well and counseling sources as applicable for condition. If  suicidal thought or fear of suicide to contact 911 and seek help immediately. Meds reviewed and patient questions answered today   4. Mixed hyperlipidemia  Assessment & Plan:  Patient  is stable with current medication and we discussed a low fat low cholesterol diet. Weight loss also discussed for this will help lower cholesterol also. Recheck lipids in 6 months.   Orders:  -     Comprehensive metabolic panel; Future  -     Lipid Panel with Direct LDL reflex; Future  5. Primary hypertension  Assessment & Plan:  Patient is stable with current anti-hypertensive medicine and continue to follow a low sodium diet and take current medication. All questions about this condition were answered today.   6. Restless legs syndrome (RLS)  Assessment & Plan:  Patient is stable  and will continue present plan of care and reassess at next routine visit. All questions about this problem from patient were answered today.   7. Seasonal allergic rhinitis due to pollen  -     montelukast (SINGULAIR) 10 mg tablet; Take 1 tablet (10 mg  total) by mouth daily at bedtime  -     fluticasone (FLONASE) 50 mcg/act nasal spray; 1 spray into each nostril daily  8. Screening for malignant neoplasm of prostate  9. Continuous opioid dependence (HCC)       Preventive health issues were discussed with patient, and age appropriate screening tests were ordered as noted in patient's After Visit Summary. Personalized health advice and appropriate referrals for health education or preventive services given if needed, as noted in patient's After Visit Summary.    History of Present Illness     HPI   Patient Care Team:  Jameson Frank MD as PCP - General (Family Medicine)  Jameson Frank MD as PCP - [facility]CP-[facility] ([facility])    Review of Systems  Medical History Reviewed by provider this encounter:       Annual Wellness Visit Questionnaire   Gale is here for her Subsequent Wellness visit.     Health Risk Assessment:   Patient rates overall health as good. Patient feels that their physical health rating is same. Patient is satisfied with their life. Eyesight was rated as same. Hearing was rated as same. Patient feels that their emotional and mental health rating is same. Patients states they are never, rarely angry. Patient states they are sometimes unusually tired/fatigued. Pain experienced in the last 7 days has been some. Patient's pain rating has been 6/10. Patient states that she has experienced weight loss or gain in last 6 months.     Fall Risk Screening:   In the past year, patient has experienced: history of falling in past year      Urinary Incontinence Screening:   Patient has leaked urine accidently in the last six months.     Home Safety:  Patient does not have trouble with stairs inside or outside of their home. Patient has working smoke alarms and has working carbon monoxide detector. Home safety hazards include: none.     Nutrition:   Current diet is No Added Salt and Frequent junk food.     Medications:   Patient is currently taking  over-the-counter supplements. OTC medications include: see medication list. Patient is able to manage medications.     Activities of Daily Living (ADLs)/Instrumental Activities of Daily Living (IADLs):   Walk and transfer into and out of bed and chair?: Yes  Dress and groom yourself?: Yes    Bathe or shower yourself?: Yes    Feed yourself? Yes  Do your laundry/housekeeping?: Yes  Manage your money, pay your bills and track your expenses?: Yes  Make your own meals?: Yes    Do your own shopping?: Yes    Previous Hospitalizations:   Any hospitalizations or ED visits within the last 12 months?: No      Advance Care Planning:   Living will: Yes    Durable POA for healthcare: Yes    Advanced directive: Yes      PREVENTIVE SCREENINGS      Cardiovascular Screening:    General: Screening Not Indicated and History Lipid Disorder      Diabetes Screening:     General: Screening Current      Cervical Cancer Screening:    General: Screening Not Indicated      Osteoporosis Screening:    General: Screening Not Indicated and History Osteoporosis      Lung Cancer Screening:     General: Screening Not Indicated      Hepatitis C Screening:    General: Screening Current    Screening, Brief Intervention, and Referral to Treatment (SBIRT)    Screening  Typical number of drinks in a day: 0  Typical number of drinks in a week: 1  Interpretation: Low risk drinking behavior.    AUDIT-C Screenin) How often did you have a drink containing alcohol in the past year? monthly or less  2) How many drinks did you have on a typical day when you were drinking in the past year? 0  3) How often did you have 6 or more drinks on one occasion in the past year? never    AUDIT-C Score: 1  Interpretation: Score 0-2 (female): Negative screen for alcohol misuse    Single Item Drug Screening:  How often have you used an illegal drug (including marijuana) or a prescription medication for non-medical reasons in the past year? never    Single Item Drug Screen  "Score: 0  Interpretation: Negative screen for possible drug use disorder    Social Determinants of Health     Financial Resource Strain: Low Risk  (5/10/2023)    Overall Financial Resource Strain (CARDIA)    • Difficulty of Paying Living Expenses: Not hard at all   Food Insecurity: No Food Insecurity (5/12/2024)    Hunger Vital Sign    • Worried About Running Out of Food in the Last Year: Never true    • Ran Out of Food in the Last Year: Never true   Transportation Needs: No Transportation Needs (5/12/2024)    PRAPARE - Transportation    • Lack of Transportation (Medical): No    • Lack of Transportation (Non-Medical): No   Housing Stability: Unknown (5/12/2024)    Housing Stability Vital Sign    • Unable to Pay for Housing in the Last Year: No   Utilities: Not At Risk (5/12/2024)    [facility] Utilities    • Threatened with loss of utilities: No     No results found.    Objective     /64 (BP Location: Left arm, Patient Position: Sitting, Cuff Size: Standard)   Pulse 90   Temp 97.7 °F (36.5 °C) (Skin)   Ht 5' 5\" (1.651 m)   Wt 78 kg (172 lb)   SpO2 96%   BMI 28.62 kg/m²     Physical Exam  Administrative Statements           "

## 2024-05-15 NOTE — PATIENT INSTRUCTIONS

## 2024-05-15 NOTE — PROGRESS NOTES
Name: Gale Mueller      : 1945      MRN: [id number]  Encounter Provider: Jameson Frank MD  Encounter Date: 5/15/2024   Encounter department: [facility]    Assessment & Plan     1. Well adult exam  2. Age-related osteoporosis without current pathological fracture  Assessment & Plan:  Patient to continue with weight bearing exercises as much as possible and oral intake of 1200 mg of calcium with 800 IU of Vit D to maximize bone protection. Pt  to continue  with DEXA scan every 2 years to reassess. All qustions regarding this problem answered today to patient satisfaction.   Orders:  -     denosumab (PROLIA) subcutaneous injection 60 mg  3. Current moderate episode of major depressive disorder without prior episode (HCC)  Assessment & Plan:  Patient to continue utilizing medical therapy as well and counseling sources as applicable for condition. If  suicidal thought or fear of suicide to contact 911 and seek help immediately. Meds reviewed and patient questions answered today   4. Mixed hyperlipidemia  Assessment & Plan:  Patient  is stable with current medication and we discussed a low fat low cholesterol diet. Weight loss also discussed for this will help lower cholesterol also. Recheck lipids in 6 months.   Orders:  -     Comprehensive metabolic panel; Future  -     Lipid Panel with Direct LDL reflex; Future  5. Primary hypertension  Assessment & Plan:  Patient is stable with current anti-hypertensive medicine and continue to follow a low sodium diet and take current medication. All questions about this condition were answered today.   6. Restless legs syndrome (RLS)  Assessment & Plan:  Patient is stable  and will continue present plan of care and reassess at next routine visit. All questions about this problem from patient were answered today.   7. Seasonal allergic rhinitis due to pollen  -     montelukast (SINGULAIR) 10 mg tablet; Take 1 tablet (10 mg total) by mouth  daily at bedtime  -     fluticasone (FLONASE) 50 mcg/act nasal spray; 1 spray into each nostril daily  8. Screening for malignant neoplasm of prostate  9. Continuous opioid dependence (HCC)      Falls Plan of Care: balance, strength, and gait training instructions were provided. Home safety education provided.     Urinary Incontinence Plan of Care: counseling topics discussed: practice Kegel (pelvic floor strengthening) exercises, use restroom every 2 hours, limit alcohol, caffeine, spicy foods, and acidic foods, limiting fluid intake 3-4 hours before bed, weight loss, preventing constipation and limiting fluid intake to 60 oz. per day.         Subjective     HPI  Review of Systems    Past Medical History:   Diagnosis Date   • Allergic    • Arthritis    • BBB (bundle branch block)    • Colon polyp    • Current moderate episode of major depressive disorder without prior episode ([facility]) 04/16/2021   • Current moderate episode of major depressive disorder without prior episode ([facility]) 04/16/2021   • Headache(784.0) 1961   • Hyperlipidemia    • Hypertension    • Osteoporosis    • RLS (restless legs syndrome)      Past Surgical History:   Procedure Laterality Date   • AUGMENTATION BREAST  1976   • AUGMENTATION MAMMAPLASTY Bilateral 1972   • CATARACT EXTRACTION, BILATERAL     • COLONOSCOPY  09/2015   • COLONOSCOPY     • DILATION AND CURETTAGE OF UTERUS     • HERNIA REPAIR  2013   • NEVUS EXCISION     • SALPINGECTOMY      for endometrosis     Family History   Problem Relation Age of Onset   • Heart disease Family    • Hypertension Family    • Colon cancer Maternal Grandmother 80   • Arthritis Maternal Grandmother    • Cancer Maternal Grandmother    • Arthritis Mother    • Hearing loss Mother    • Mental illness Mother         Bi-polar   • Bipolar disorder Mother    • Schizophrenia Mother      Social History     Socioeconomic History   • Marital status:      Spouse name: None   • Number of children: None   • Years of  education: None   • Highest education level: None   Occupational History   • 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   Tobacco Use   • Smoking status: Never     Passive exposure: Never   • Smokeless tobacco: Never   Vaping Use   • Vaping status: Never Used   Substance and Sexual Activity   • Alcohol use: Not Currently     Comment: Rare   • Drug use: Never     Comment: No use   • Sexual activity: Not Currently     Partners: Male     Birth control/protection: Post-menopausal   Other Topics Concern   • None   Social History Narrative    · Most recent tobacco use screenin2019      · Do you currently or have you served in the [employer]:   No      · Were you activated, into active duty, as a member of the [employer] or as a Reservist:   No      · 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      · Exercise level:   Moderate      · Diet:   Regular      · General stress level:   High      · Alcohol intake:   None      · Caffeine intake:   Moderate      · Seat belts used routinely:   Yes      · Sunscreen used routinely:   Yes      · Smoke alarm in home:   Yes      · Advance directive:   No      · Has the Patient had a mammogram to screen for breast cancer within 24 months:   Yes      · Live alone or with others:   alone      · Are you currently employed:   Yes      Social Determinants of Health     Financial Resource Strain: Low Risk  (5/10/2023)    Overall Financial Resource Strain (CARDIA)    • Difficulty of Paying Living Expenses: Not hard at all   Food Insecurity: No Food Insecurity (2024)    Hunger Vital Sign    • Worried About Running Out of Food in the Last Year: Never true    • Ran Out of Food in the Last Year: Never true   Transportation Needs: No Transportation Needs (2024)    PRAPARE - Transportation    • Lack of Transportation (Medical): No    • Lack of Transportation (Non-Medical): No   Physical Activity: Not on file   Stress: Not on file   Social Connections: Not on file   Intimate Partner  Violence: Not on file   Housing Stability: Unknown (5/12/2024)    Housing Stability Vital Sign    • Unable to Pay for Housing in the Last Year: No    • Number of Times Moved in the Last Year: Not on file    • Homeless in the Last Year: Not on file     Current Outpatient Medications on File Prior to Visit   Medication Sig   • albuterol (ProAir HFA) 90 mcg/act inhaler Inhale 2 puffs every 6 (six) hours as needed for wheezing   • atorvastatin (LIPITOR) 20 mg tablet TAKE 1 TABLET(20 MG) BY MOUTH DAILY   • chlorthalidone 50 MG tablet TAKE 1 TABLET(50 MG) BY MOUTH DAILY   • Cholecalciferol (VITAMIN D3 PO) Take by mouth   • ciclopirox (PENLAC) 8 % solution ciclopirox 8 % topical solution   APPLY TO THE AFFECTED AREA(S) BY TOPICAL ROUTE ONCE DAILY PREFERABLY AT BEDTIME OR 8 HOURS BEFORE WASHING   • Cyanocobalamin (VITAMIN B12 PO) Take by mouth   • fluticasone-salmeterol (Advair HFA) 115-21 MCG/ACT inhaler Inhale 2 puffs 2 (two) times a day Rinse mouth after use.   • GARLIC OIL PO Take by mouth   • halobetasol (ULTRAVATE) 0.05 % cream Apply topically 2 (two) times a day   • lisinopril (ZESTRIL) 40 mg tablet TAKE 1 TABLET(40 MG) BY MOUTH DAILY   • Omega-3 Fatty Acids (FISH OIL PO) Take by mouth   • pramipexole (MIRAPEX) 1 mg tablet TAKE 1 TABLET(1 MG) BY MOUTH TWICE DAILY     Allergies   Allergen Reactions   • Sertraline Visual Disturbance     Reaction Date: 14Jan2008;    • Ambien [Zolpidem] Other (See Comments)     Was doing things she did not know she was doing     Immunization History   Administered Date(s) Administered   • COVID-19 MODERNA VACC 0.5 ML IM 02/12/2021, 03/11/2021, 11/19/2021, 04/26/2022, 05/19/2023, 09/18/2023   • COVID-19 Moderna Vac BIVALENT 12 Yr+ IM 0.5 ML 10/11/2022, 05/09/2023   • H1N1, All Formulations 12/04/2009   • INFLUENZA 09/16/2015, 11/04/2016, 10/21/2017, 11/05/2020, 10/20/2021, 10/11/2022   • Influenza, high dose seasonal 0.7 mL 11/05/2020   • Influenza, seasonal, injectable 10/11/2007,  "11/04/2008, 09/01/2009   • Pneumococcal Conjugate 13-Valent 10/05/2016   • Zoster 05/01/2017       Objective     /64 (BP Location: Left arm, Patient Position: Sitting, Cuff Size: Standard)   Pulse 90   Temp 97.7 °F (36.5 °C) (Skin)   Ht 5' 5\" (1.651 m)   Wt 78 kg (172 lb)   SpO2 96%   BMI 28.62 kg/m²     Physical Exam  Jameson Frank MD    Answers submitted by the patient for this visit:  Medicare Annual Wellness Visit (Submitted on 5/12/2024)  How would you rate your overall health?: good  Compared to last year, how is your physical health?: same  In general, how satisfied are you with your life?: satisfied  Compared to last year, how is your eyesight?: same  Compared to last year, how is your hearing?: same  Compared to last year, how is your emotional/mental health?: same  How often is anger a problem for you?: never, rarely  How often do you feel unusually tired/fatigued?: sometimes  In the past 7 days, how much pain have you experienced?: some  If you answered \"some\" or \"a lot\", please rate the severity of your pain on a scale of 1 to 10 (1 being the least severe pain and 10 being the most intense pain).: 6/10  In the past 6 months, have you lost or gained 10 pounds without trying?: Yes  One or more falls in the last year: Yes  In the past 6 months, have you accidentally leaked urine?: Yes  Do you have trouble with the stairs inside or outside your home?: No  Does your home have working smoke alarms?: Yes  Does your home have a carbon monoxide monitor?: Yes  Which safety hazards (if any) have you experienced in your home? Please select all that apply.: none  How would you describe your current diet? Please select all that apply.: No Added Salt, Frequent junk food  In addition to prescription medications, are you taking any over-the-counter supplements?: Yes  If yes, what supplements are you taking?: Garlic, B12, D3, melatonin  Can you manage your medications?: Yes  Are you currently taking any " opioid medications?: No  Can you walk and transfer into and out of your bed and chair?: Yes  Can you dress and groom yourself?: Yes  Can you bathe or shower yourself?: Yes  Can you feed yourself?: Yes  Can you do your laundry/ housekeeping?: Yes  Can you manage your money, pay your bills, and track your expenses?: Yes  Can you make your own meals?: Yes  Can you do your own shopping?: Yes  Within the last 12 months, have you had any hospitalizations or Emergency Department visits?: No  Do you have a living will?: Yes  Do you have a Durable POA (Power of ) for healthcare decisions?: Yes  Do you have an Advanced Directive for end of life decisions?: Yes  How often have you used an illegal drug (including marijuana) or a prescription medication for non-medical reasons in the past year?: never  What is the typical number of drinks you consume in a day?: 0  What is the typical number of drinks you consume in a week?: 1  How often did you have a drink containing alcohol in the past year?: monthly or less  How many drinks did you have on a typical day  when you were drinking in the past year?: 0  How often did you have 6 or more drinks on one occasion in the past year?: never

## 2024-05-16 RX ORDER — MONTELUKAST SODIUM 10 MG/1
10 TABLET ORAL
Qty: 90 TABLET | Refills: 1 | Status: SHIPPED | OUTPATIENT
Start: 2024-05-16

## 2024-05-17 ENCOUNTER — TELEPHONE (OUTPATIENT)
Age: 79
End: 2024-05-17

## 2024-05-17 DIAGNOSIS — R05.1 ACUTE COUGH: Primary | ICD-10-CM

## 2024-05-17 RX ORDER — CODEINE PHOSPHATE/GUAIFENESIN 10-100MG/5
5 LIQUID (ML) ORAL 3 TIMES DAILY PRN
Qty: 180 ML | Refills: 0 | Status: SHIPPED | OUTPATIENT
Start: 2024-05-17

## 2024-05-17 NOTE — TELEPHONE ENCOUNTER
Yamini from [facility] pharmacy is calling stating that Gale notified them that she received notifications that a codeine cough syrup was delayed for her.     The pharmacy never received any orders for a codeine dough syrup.     Is this something was supposed to be ordered?  Please advise, thank you!

## 2024-05-28 ENCOUNTER — APPOINTMENT (OUTPATIENT)
Dept: LAB | Facility: CLINIC | Age: 79
End: 2024-05-28
Payer: COMMERCIAL

## 2024-05-28 DIAGNOSIS — E78.2 MIXED HYPERLIPIDEMIA: ICD-10-CM

## 2024-05-28 LAB
ALBUMIN SERPL BCP-MCNC: 4.3 G/DL (ref 3.5–5)
ALP SERPL-CCNC: 51 U/L (ref 34–104)
ALT SERPL W P-5'-P-CCNC: 23 U/L (ref 7–52)
ANION GAP SERPL CALCULATED.3IONS-SCNC: 10 MMOL/L (ref 4–13)
AST SERPL W P-5'-P-CCNC: 14 U/L (ref 13–39)
BILIRUB SERPL-MCNC: 0.61 MG/DL (ref 0.2–1)
BUN SERPL-MCNC: 24 MG/DL (ref 5–25)
CALCIUM SERPL-MCNC: 9.8 MG/DL (ref 8.4–10.2)
CHLORIDE SERPL-SCNC: 100 MMOL/L (ref 96–108)
CHOLEST SERPL-MCNC: 125 MG/DL
CO2 SERPL-SCNC: 29 MMOL/L (ref 21–32)
CREAT SERPL-MCNC: 0.92 MG/DL (ref 0.6–1.3)
GFR SERPL CREATININE-BSD FRML MDRD: 60 ML/MIN/1.73SQ M
GLUCOSE P FAST SERPL-MCNC: 107 MG/DL (ref 65–99)
HDLC SERPL-MCNC: 40 MG/DL
LDLC SERPL CALC-MCNC: 57 MG/DL (ref 0–100)
POTASSIUM SERPL-SCNC: 4.2 MMOL/L (ref 3.5–5.3)
PROT SERPL-MCNC: 7.1 G/DL (ref 6.4–8.4)
SODIUM SERPL-SCNC: 139 MMOL/L (ref 135–147)
TRIGL SERPL-MCNC: 139 MG/DL

## 2024-05-28 PROCEDURE — 80061 LIPID PANEL: CPT

## 2024-05-28 PROCEDURE — 80053 COMPREHEN METABOLIC PANEL: CPT

## 2024-05-28 PROCEDURE — 36415 COLL VENOUS BLD VENIPUNCTURE: CPT

## 2024-05-29 ENCOUNTER — TELEPHONE (OUTPATIENT)
Dept: FAMILY MEDICINE CLINIC | Facility: CLINIC | Age: 79
End: 2024-05-29

## 2024-05-30 DIAGNOSIS — R05.1 ACUTE COUGH: Primary | ICD-10-CM

## 2024-07-18 ENCOUNTER — HOSPITAL ENCOUNTER (OUTPATIENT)
Dept: MAMMOGRAPHY | Facility: HOSPITAL | Age: 79
Discharge: HOME | End: 2024-07-18
Attending: FAMILY MEDICINE
Payer: COMMERCIAL

## 2024-07-18 VITALS — BODY MASS INDEX: 28.65 KG/M2 | WEIGHT: 171.96 LBS | HEIGHT: 65 IN

## 2024-07-18 DIAGNOSIS — R92.8 MAMMOGRAM ABNORMAL: ICD-10-CM

## 2024-07-18 PROCEDURE — 77063 BREAST TOMOSYNTHESIS BI: CPT

## 2024-07-18 PROCEDURE — 77067 SCR MAMMO BI INCL CAD: CPT

## 2024-08-03 DIAGNOSIS — I10 PRIMARY HYPERTENSION: ICD-10-CM

## 2024-08-04 RX ORDER — CHLORTHALIDONE 50 MG/1
TABLET ORAL
Qty: 90 TABLET | Refills: 1 | Status: SHIPPED | OUTPATIENT
Start: 2024-08-04

## 2024-08-22 PROBLEM — F11.20 CONTINUOUS OPIOID DEPENDENCE (HCC): Status: RESOLVED | Noted: 2022-03-31 | Resolved: 2024-08-22

## 2024-09-20 ENCOUNTER — HOSPITAL ENCOUNTER (EMERGENCY)
Facility: HOSPITAL | Age: 79
Discharge: HOME | End: 2024-09-20
Attending: EMERGENCY MEDICINE
Payer: COMMERCIAL

## 2024-09-20 ENCOUNTER — APPOINTMENT (EMERGENCY)
Dept: RADIOLOGY | Facility: HOSPITAL | Age: 79
End: 2024-09-20
Payer: COMMERCIAL

## 2024-09-20 VITALS
DIASTOLIC BLOOD PRESSURE: 58 MMHG | RESPIRATION RATE: 18 BRPM | SYSTOLIC BLOOD PRESSURE: 126 MMHG | HEART RATE: 89 BPM | TEMPERATURE: 98.1 F | OXYGEN SATURATION: 96 %

## 2024-09-20 DIAGNOSIS — M25.531 ACUTE PAIN OF RIGHT WRIST: Primary | ICD-10-CM
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
## 2024-09-20 DIAGNOSIS — M54.9 BACK PAIN: ICD-10-CM

## 2024-09-20 PROCEDURE — 73130 X-RAY EXAM OF HAND: CPT

## 2024-09-20 PROCEDURE — 99284 EMERGENCY DEPT VISIT MOD MDM: CPT

## 2024-09-20 PROCEDURE — 73110 X-RAY EXAM OF WRIST: CPT

## 2024-09-20 PROCEDURE — 72100 X-RAY EXAM L-S SPINE 2/3 VWS: CPT

## 2024-09-20 PROCEDURE — 99284 EMERGENCY DEPT VISIT MOD MDM: CPT | Performed by: EMERGENCY MEDICINE

## 2024-09-20 RX ORDER — GINSENG 100 MG
1 CAPSULE ORAL ONCE
Status: COMPLETED | OUTPATIENT
Start: 2024-09-20 | End: 2024-09-20

## 2024-09-20 RX ADMIN — BACITRACIN 1 SMALL APPLICATION: 500 OINTMENT TOPICAL at 16:47

## 2024-09-20 NOTE — ED PROVIDER NOTES
1. Acute pain of right wrist    2. Back pain    3. Fall, initial encounter      ED Disposition       ED Disposition   Discharge    Condition   Stable    Date/Time   Fri Sep 20, 2024  4:44 PM    Comment   Gale Mueller discharge to home/self care.                   Assessment & Plan       Medical Decision Making  78-year-old female presenting to the ED from home for right wrist and lower back pain after a fall last night.     Differential diagnoses: Fracture, dislocation, contusion, muscle strain/sprain    Patient presents hemodynamically stable with right wrist and lower back pain.  Patient states that she took Tylenol before arriving to the ED, and denies any further pain medication at this time.  Palpable right third digit, right wrist, and lumbar spine tenderness, but no obvious deformities on exam.  X-ray shows no acute fractures or dislocations of the right hand, right wrist, or lumbar spine.  Bacitracin applied to right elbow abrasion with new bandage placed.  Patient agreeable with and stable for discharge home.  Discussed with patient fall precautions and to avoid activities with increased fall risk.    Amount and/or Complexity of Data Reviewed  Radiology: ordered.                ED Course as of 09/20/24 1656   Fri Sep 20, 2024   1602 XR imaging of lumbar spine, right hand, and right wrist ordered.   1653 Patient stable for discharge home.       Medications   bacitracin topical ointment 1 small application (1 small application Topical Given 9/20/24 1647)       History of Present Illness       78-year-old female presenting to the ED from home for right wrist and lower back pain after a fall last night.  Patient states that she was readjusting her curtains and standing on the couch last night, when she lost her balance and fell straight onto her back onto a wooden coffee table, hitting her lower back on impact.  Denies any head strike, loss of consciousness, or any blood thinner use.  Patient reports lower  back and right wrist pain, but was otherwise feeling well enough to go to work today.  However, she realized she should get evaluated just in case there are any underlying injuries and presented to the ED. Reports slightly improved pain since initial fall.  Denies any headache, dizziness, lightheadedness, vision changes, chest pain, shortness of breath, nausea, vomiting, or abdominal pain.      Fall  Associated symptoms: back pain    Associated symptoms: no abdominal pain, no chest pain, no headaches, no nausea, no neck pain and no vomiting        Review of Systems   Eyes:  Negative for visual disturbance.   Respiratory:  Negative for shortness of breath.    Cardiovascular:  Negative for chest pain.   Gastrointestinal:  Negative for abdominal pain, nausea and vomiting.   Genitourinary:  Negative for dysuria and hematuria.   Musculoskeletal:  Positive for back pain. Negative for neck pain and neck stiffness.        Right wrist pain.   Skin:  Positive for wound. Negative for rash.        Right elbow bandaged skin abrasion.   Neurological:  Negative for dizziness, syncope, weakness, light-headedness, numbness and headaches.   Psychiatric/Behavioral:  Negative for confusion.    All other systems reviewed and are negative.          Objective     ED Triage Vitals   Temperature Pulse Blood Pressure Respirations SpO2 Patient Position - Orthostatic VS   09/20/24 1516 09/20/24 1512 09/20/24 1512 09/20/24 1512 09/20/24 1512 --   98.1 °F (36.7 °C) 89 126/58 18 96 %       Temp Source Heart Rate Source BP Location FiO2 (%) Pain Score    09/20/24 1516 09/20/24 1512 09/20/24 1512 -- 09/20/24 1512    Oral Monitor Left arm  6        Physical Exam  Vitals and nursing note reviewed.   Constitutional:       General: She is not in acute distress.     Appearance: Normal appearance. She is normal weight. She is not ill-appearing, toxic-appearing or diaphoretic.   HENT:      Head: Normocephalic and atraumatic.      Right Ear: External ear  normal.      Left Ear: External ear normal.      Nose: Nose normal.      Mouth/Throat:      Mouth: Mucous membranes are moist.   Eyes:      General: No scleral icterus.        Right eye: No discharge.         Left eye: No discharge.      Extraocular Movements: Extraocular movements intact.      Conjunctiva/sclera: Conjunctivae normal.   Cardiovascular:      Rate and Rhythm: Normal rate and regular rhythm.      Heart sounds: Normal heart sounds. No murmur heard.  Pulmonary:      Effort: Pulmonary effort is normal. No respiratory distress.      Breath sounds: Normal breath sounds. No wheezing, rhonchi or rales.   Abdominal:      General: Abdomen is flat.      Palpations: Abdomen is soft.      Tenderness: There is no abdominal tenderness. There is no guarding or rebound.   Musculoskeletal:         General: Normal range of motion.      Cervical back: Normal and normal range of motion. No rigidity, tenderness or bony tenderness. No spinous process tenderness or muscular tenderness. Normal range of motion.      Thoracic back: No tenderness or bony tenderness.      Lumbar back: Tenderness and bony tenderness present.      Right lower leg: Edema present.      Left lower leg: Edema present.      Comments: Right third digit tenderness to palpation, no deformities from baseline.    Right wrist tenderness to palpation, no deformities.  Bilateral lower leg lymphedema at baseline.    Spinal kyphosis at baseline.    Lumbar bony and paraspinal tenderness.    No palpable step-offs.     Skin:     General: Skin is warm and dry.      Capillary Refill: Capillary refill takes less than 2 seconds.      Comments: Small right elbow superficial skin abrasion, no active bleeding.   Neurological:      General: No focal deficit present.      Mental Status: She is alert and oriented to person, place, and time. Mental status is at baseline.      Sensory: Sensation is intact.      Motor: Motor function is intact.   Psychiatric:         Mood and  Affect: Mood normal.         Behavior: Behavior normal.         Labs Reviewed - No data to display  XR hand 3+ views RIGHT   ED Interpretation by Angelica Marc MD (09/20 1642)   FINDINGS:     No acute fracture or dislocation.     Polyarticular interphalangeal osteoarthritis throughout the hand. First CMC DJD.     No lytic or blastic osseous lesion.     Unremarkable soft tissues.     IMPRESSION:     No acute osseous abnormality.        Computerized Assisted Algorithm (CAA) may have been used to analyze all applicable images.        Workstation performed: [id number]      Final Interpretation by Danny Dela Cruz MD (09/20 1637)      No acute osseous abnormality.         Computerized Assisted Algorithm (CAA) may have been used to analyze all applicable images.         Workstation performed: [id number]         XR wrist 3+ views RIGHT   ED Interpretation by Angelica Marc MD (09/20 1648)   FINDINGS:     No acute fracture or dislocation.     First carpometacarpal osteoarthritis.     No lytic or blastic osseous lesion.     Unremarkable soft tissues.     IMPRESSION:     No acute osseous abnormality.        Computerized Assisted Algorithm (CAA) may have been used to analyze all applicable images.       Workstation performed: [id number]      Final Interpretation by Danny Dela Cruz MD (09/20 1639)      No acute osseous abnormality.         Computerized Assisted Algorithm (CAA) may have been used to analyze all applicable images.            Workstation performed: [id number]         XR spine lumbar 2 or 3 views injury   ED Interpretation by Angelica Marc MD (09/20 3154)   FINDINGS:     No acute fracture. Intact pedicles.     Five non-rib-bearing lumbar vertebral bodies.     Normal alignment.     Moderate L5-S1 disc space narrowing, subchondral sclerosis, and endplate spondylosis, consistent with degenerative disc disease. Moderate lower lumbar facet arthropathy.     Unremarkable soft tissues.      IMPRESSION:     No acute osseous abnormality.     Degenerative changes as described.        Computerized Assisted Algorithm (CAA) may have been used to analyze all applicable images.        Workstation performed: [id number]      Final Interpretation by Danny Dela Cruz MD (1640)      No acute osseous abnormality.      Degenerative changes as described.         Computerized Assisted Algorithm (CAA) may have been used to analyze all applicable images.         Workstation performed: [id number]             Procedures    ED Medication and Procedure Management   Prior to Admission Medications   Prescriptions Last Dose Informant Patient Reported? Taking?   Cholecalciferol (VITAMIN D3 PO)  Self Yes No   Sig: Take by mouth   Cyanocobalamin (VITAMIN B12 PO)  Self Yes No   Sig: Take by mouth   GARLIC OIL PO  Self Yes No   Sig: Take by mouth   Omega-3 Fatty Acids (FISH OIL PO)  Self Yes No   Sig: Take by mouth   albuterol (ProAir HFA) 90 mcg/act inhaler   No No   Sig: Inhale 2 puffs every 6 (six) hours as needed for wheezing   atorvastatin (LIPITOR) 20 mg tablet   No No   Sig: TAKE 1 TABLET(20 MG) BY MOUTH DAILY   chlorthalidone 50 MG tablet   No No   Sig: TAKE 1 TABLET(50 MG) BY MOUTH DAILY   ciclopirox (PENLAC) 8 % solution   Yes No   Sig: ciclopirox 8 % topical solution   APPLY TO THE AFFECTED AREA(S) BY TOPICAL ROUTE ONCE DAILY PREFERABLY AT BEDTIME OR 8 HOURS BEFORE WASHING   fluticasone (FLONASE) 50 mcg/act nasal spray   No No   Si spray into each nostril daily   fluticasone-salmeterol (Advair HFA) 115-21 MCG/ACT inhaler   No No   Sig: Inhale 2 puffs 2 (two) times a day Rinse mouth after use.   guaifenesin-codeine (GUAIFENESIN AC) 100-10 MG/5ML liquid   No No   Sig: Take 5 mL by mouth 3 (three) times a day as needed for cough   halobetasol (ULTRAVATE) 0.05 % cream   No No   Sig: Apply topically 2 (two) times a day   lisinopril (ZESTRIL) 40 mg tablet   No No   Sig: TAKE 1 TABLET(40 MG) BY MOUTH DAILY    montelukast (SINGULAIR) 10 mg tablet   No No   Sig: TAKE 1 TABLET(10 MG) BY MOUTH DAILY AT BEDTIME   pramipexole (MIRAPEX) 1 mg tablet   No No   Sig: TAKE 1 TABLET(1 MG) BY MOUTH TWICE DAILY      Facility-Administered Medications: None     Patient's Medications   Discharge Prescriptions    No medications on file     No discharge procedures on file.     Angelica Marc MD  09/20/24 3147

## 2024-09-20 NOTE — ED ATTENDING ATTESTATION
9/20/2024  IBrad DO, saw and evaluated the patient. I have discussed the patient with the resident/non-physician practitioner and agree with the resident's/non-physician practitioner's findings, Plan of Care, and MDM as documented in the resident's/non-physician practitioner's note, except where noted. All available labs and Radiology studies were reviewed.  I was present for key portions of any procedure(s) performed by the resident/non-physician practitioner and I was immediately available to provide assistance.       At this point I agree with the current assessment done in the Emergency Department.  I have conducted an independent evaluation of this patient a history and physical is as follows:    78-year-old female in the ED for evaluation of low back pain and right wrist pain.  She was tending on the couch cleaning yesterday, lost her balance and fell backwards, landing on a dinner tray that was resting on a coffee table, landing directly on her back.  She states she actually feels a bit better today and she actually went to work but then decided she better get checked.  She states her wrist hurts more than her back.  She denies any neurologic symptoms such as numbness or tingling or weakness in her extremities.  She has been able to use her right hand and wrist with some discomfort.    PE:  The patient is well appearing, non-toxic, in NAD. Head: normocephalic, atraumatic. HEENT: mucous membranes moist.  Lungs: CTA b/l, no resp distress. Heart: RRR. No M/R/G. Abdomen: NT, ND, no R/R/G. Neuro: CN2-12 intact, GCS 15. Normal strength and sensation, normal speech and gait. Cap refill < 2 sec, skin warm and dry. No rashes or lesions.  MSK: mild tenderness to the lumbar spine both midline and paraspinal.  RUE: mild tenderness to the wrist (distal radius) without deformity, normal ROM. NVI distally.    Xrays of right hand/wrist and lumbar spine negative.  Dx back contusion, wrist contusion vs.  Sprain.  Stable for d/c home, rest, tylenol and nsaids prn.    ED Course         Critical Care Time  Procedures

## 2024-10-24 ENCOUNTER — TELEPHONE (OUTPATIENT)
Age: 79
End: 2024-10-24

## 2024-10-24 DIAGNOSIS — E78.2 MIXED HYPERLIPIDEMIA: ICD-10-CM

## 2024-10-24 DIAGNOSIS — I10 PRIMARY HYPERTENSION: Primary | ICD-10-CM

## 2024-10-24 NOTE — TELEPHONE ENCOUNTER
Pt. Called to remind Dr. Frank to put in lab orders that he wants the patient to have before her appt. In Nov.  She will be going to the lab sometime next week.   Ty

## 2024-11-01 DIAGNOSIS — E78.2 MIXED HYPERLIPIDEMIA: ICD-10-CM

## 2024-11-01 DIAGNOSIS — I10 PRIMARY HYPERTENSION: ICD-10-CM

## 2024-11-01 RX ORDER — ATORVASTATIN CALCIUM 20 MG/1
TABLET, FILM COATED ORAL
Qty: 90 TABLET | Refills: 1 | Status: SHIPPED | OUTPATIENT
Start: 2024-11-01

## 2024-11-01 RX ORDER — LISINOPRIL 40 MG/1
TABLET ORAL
Qty: 90 TABLET | Refills: 1 | Status: SHIPPED | OUTPATIENT
Start: 2024-11-01

## 2024-11-12 ENCOUNTER — RA CDI HCC (OUTPATIENT)
Dept: OTHER | Facility: HOSPITAL | Age: 79
End: 2024-11-12

## 2024-11-12 NOTE — PROGRESS NOTES
HCC coding opportunities          Chart Reviewed number of suggestions sent to Provider: 1  Recommend adding I12.9 - combination code.  Hypertensive renal disease.  Patient's eGFR levels have been in the stage 2 CKD range for over a year.     Patients Insurance     Medicare Insurance: [employer]age

## 2024-12-04 ENCOUNTER — APPOINTMENT (OUTPATIENT)
Dept: LAB | Facility: CLINIC | Age: 79
End: 2024-12-04
Payer: COMMERCIAL

## 2024-12-04 DIAGNOSIS — I10 PRIMARY HYPERTENSION: ICD-10-CM

## 2024-12-04 DIAGNOSIS — E78.2 MIXED HYPERLIPIDEMIA: ICD-10-CM

## 2024-12-04 LAB
ALBUMIN SERPL BCG-MCNC: 4.8 G/DL (ref 3.5–5)
ALP SERPL-CCNC: 28 U/L (ref 34–104)
ALT SERPL W P-5'-P-CCNC: 22 U/L (ref 7–52)
ANION GAP SERPL CALCULATED.3IONS-SCNC: 9 MMOL/L (ref 4–13)
AST SERPL W P-5'-P-CCNC: 16 U/L (ref 13–39)
BACTERIA UR QL AUTO: NORMAL /HPF
BASOPHILS # BLD AUTO: 0.05 THOUSANDS/ÂΜL (ref 0–0.1)
BASOPHILS NFR BLD AUTO: 1 % (ref 0–1)
BILIRUB SERPL-MCNC: 0.94 MG/DL (ref 0.2–1)
BILIRUB UR QL STRIP: NEGATIVE
BILIRUB UR QL STRIP: NEGATIVE
BUN SERPL-MCNC: 17 MG/DL (ref 5–25)
CALCIUM SERPL-MCNC: 10.2 MG/DL (ref 8.4–10.2)
CHLORIDE SERPL-SCNC: 96 MMOL/L (ref 96–108)
CHOLEST SERPL-MCNC: 151 MG/DL (ref ?–200)
CLARITY UR: CLEAR
CLARITY UR: CLEAR
CO2 SERPL-SCNC: 31 MMOL/L (ref 21–32)
COLOR UR: COLORLESS
COLOR UR: COLORLESS
CREAT SERPL-MCNC: 0.87 MG/DL (ref 0.6–1.3)
EOSINOPHIL # BLD AUTO: 0.3 THOUSAND/ÂΜL (ref 0–0.61)
EOSINOPHIL NFR BLD AUTO: 5 % (ref 0–6)
ERYTHROCYTE [DISTWIDTH] IN BLOOD BY AUTOMATED COUNT: 11.9 % (ref 11.6–15.1)
GFR SERPL CREATININE-BSD FRML MDRD: 64 ML/MIN/1.73SQ M
GLUCOSE P FAST SERPL-MCNC: 102 MG/DL (ref 65–99)
GLUCOSE UR STRIP-MCNC: NEGATIVE MG/DL
GLUCOSE UR STRIP-MCNC: NEGATIVE MG/DL
HCT VFR BLD AUTO: 36.6 % (ref 34.8–46.1)
HDLC SERPL-MCNC: 53 MG/DL
HGB BLD-MCNC: 12.6 G/DL (ref 11.5–15.4)
HGB UR QL STRIP.AUTO: NEGATIVE
HGB UR QL STRIP.AUTO: NEGATIVE
IMM GRANULOCYTES # BLD AUTO: 0.01 THOUSAND/UL (ref 0–0.2)
IMM GRANULOCYTES NFR BLD AUTO: 0 % (ref 0–2)
KETONES UR STRIP-MCNC: NEGATIVE MG/DL
KETONES UR STRIP-MCNC: NEGATIVE MG/DL
LDLC SERPL CALC-MCNC: 67 MG/DL (ref 0–100)
LEUKOCYTE ESTERASE UR QL STRIP: NEGATIVE
LEUKOCYTE ESTERASE UR QL STRIP: NEGATIVE
LYMPHOCYTES # BLD AUTO: 1.16 THOUSANDS/ÂΜL (ref 0.6–4.47)
LYMPHOCYTES NFR BLD AUTO: 19 % (ref 14–44)
MAGNESIUM SERPL-MCNC: 2 MG/DL (ref 1.9–2.7)
MCH RBC QN AUTO: 32.1 PG (ref 26.8–34.3)
MCHC RBC AUTO-ENTMCNC: 34.4 G/DL (ref 31.4–37.4)
MCV RBC AUTO: 93 FL (ref 82–98)
MONOCYTES # BLD AUTO: 0.5 THOUSAND/ÂΜL (ref 0.17–1.22)
MONOCYTES NFR BLD AUTO: 8 % (ref 4–12)
NEUTROPHILS # BLD AUTO: 4.25 THOUSANDS/ÂΜL (ref 1.85–7.62)
NEUTS SEG NFR BLD AUTO: 67 % (ref 43–75)
NITRITE UR QL STRIP: NEGATIVE
NITRITE UR QL STRIP: NEGATIVE
NON-SQ EPI CELLS URNS QL MICRO: NORMAL /HPF
NRBC BLD AUTO-RTO: 0 /100 WBCS
PH UR STRIP.AUTO: 7 [PH]
PH UR STRIP.AUTO: 7 [PH]
PLATELET # BLD AUTO: 317 THOUSANDS/UL (ref 149–390)
PMV BLD AUTO: 9.2 FL (ref 8.9–12.7)
POTASSIUM SERPL-SCNC: 4.4 MMOL/L (ref 3.5–5.3)
PROT SERPL-MCNC: 7.5 G/DL (ref 6.4–8.4)
PROT UR STRIP-MCNC: NEGATIVE MG/DL
PROT UR STRIP-MCNC: NEGATIVE MG/DL
RBC # BLD AUTO: 3.92 MILLION/UL (ref 3.81–5.12)
RBC #/AREA URNS AUTO: NORMAL /HPF
SODIUM SERPL-SCNC: 136 MMOL/L (ref 135–147)
SP GR UR STRIP.AUTO: 1.01 (ref 1–1.03)
SP GR UR STRIP.AUTO: 1.01 (ref 1–1.03)
TRIGL SERPL-MCNC: 155 MG/DL (ref ?–150)
TSH SERPL DL<=0.05 MIU/L-ACNC: 0.85 UIU/ML (ref 0.45–4.5)
URATE SERPL-MCNC: 4.8 MG/DL (ref 2–7.5)
UROBILINOGEN UR STRIP-ACNC: <2 MG/DL
UROBILINOGEN UR STRIP-ACNC: <2 MG/DL
WBC # BLD AUTO: 6.27 THOUSAND/UL (ref 4.31–10.16)
WBC #/AREA URNS AUTO: NORMAL /HPF

## 2024-12-04 PROCEDURE — 84550 ASSAY OF BLOOD/URIC ACID: CPT

## 2024-12-04 PROCEDURE — 80061 LIPID PANEL: CPT

## 2024-12-04 PROCEDURE — 83735 ASSAY OF MAGNESIUM: CPT

## 2024-12-04 PROCEDURE — 84443 ASSAY THYROID STIM HORMONE: CPT

## 2024-12-04 PROCEDURE — 80053 COMPREHEN METABOLIC PANEL: CPT

## 2024-12-04 PROCEDURE — 36415 COLL VENOUS BLD VENIPUNCTURE: CPT

## 2024-12-04 PROCEDURE — 81003 URINALYSIS AUTO W/O SCOPE: CPT

## 2024-12-04 PROCEDURE — 81001 URINALYSIS AUTO W/SCOPE: CPT

## 2024-12-04 PROCEDURE — 85025 COMPLETE CBC W/AUTO DIFF WBC: CPT

## 2024-12-05 ENCOUNTER — TELEPHONE (OUTPATIENT)
Dept: FAMILY MEDICINE CLINIC | Facility: CLINIC | Age: 79
End: 2024-12-05

## 2024-12-05 DIAGNOSIS — Z00.6 ENCOUNTER FOR EXAMINATION FOR NORMAL COMPARISON OR CONTROL IN CLINICAL RESEARCH PROGRAM: ICD-10-CM

## 2025-01-03 ENCOUNTER — OFFICE VISIT (OUTPATIENT)
Dept: FAMILY MEDICINE CLINIC | Facility: CLINIC | Age: 80
End: 2025-01-03
Payer: COMMERCIAL

## 2025-01-03 VITALS
SYSTOLIC BLOOD PRESSURE: 128 MMHG | OXYGEN SATURATION: 96 % | HEART RATE: 89 BPM | DIASTOLIC BLOOD PRESSURE: 70 MMHG | BODY MASS INDEX: 28.32 KG/M2 | RESPIRATION RATE: 16 BRPM | WEIGHT: 170 LBS | HEIGHT: 65 IN | TEMPERATURE: 98.2 F

## 2025-01-03 DIAGNOSIS — E78.2 MIXED HYPERLIPIDEMIA: ICD-10-CM

## 2025-01-03 DIAGNOSIS — G25.81 RESTLESS LEGS SYNDROME (RLS): ICD-10-CM

## 2025-01-03 DIAGNOSIS — F32.1 CURRENT MODERATE EPISODE OF MAJOR DEPRESSIVE DISORDER WITHOUT PRIOR EPISODE (HCC): ICD-10-CM

## 2025-01-03 DIAGNOSIS — Z59.19 MOLD GROWTH IN HOME: ICD-10-CM

## 2025-01-03 DIAGNOSIS — Z77.120 MOLD GROWTH IN HOME: ICD-10-CM

## 2025-01-03 DIAGNOSIS — I10 PRIMARY HYPERTENSION: ICD-10-CM

## 2025-01-03 DIAGNOSIS — R40.4 TRANSIENT ALTERATION OF AWARENESS: ICD-10-CM

## 2025-01-03 DIAGNOSIS — M81.0 AGE-RELATED OSTEOPOROSIS WITHOUT CURRENT PATHOLOGICAL FRACTURE: Primary | ICD-10-CM

## 2025-01-03 PROCEDURE — 96372 THER/PROPH/DIAG INJ SC/IM: CPT | Performed by: FAMILY MEDICINE

## 2025-01-03 PROCEDURE — 99214 OFFICE O/P EST MOD 30 MIN: CPT | Performed by: FAMILY MEDICINE

## 2025-01-03 SDOH — ECONOMIC STABILITY - HOUSING INSECURITY: OTHER INADEQUATE HOUSING: Z59.19

## 2025-01-03 NOTE — PROGRESS NOTES
Name: Gale Mueller      : 1945      MRN: [id number]  Encounter Provider: Jameson Frank MD  Encounter Date: 1/3/2025   Encounter department: [facility]  :  Assessment & Plan  Age-related osteoporosis without current pathological fracture  Patient to continue with weight bearing exercises as much as possible and oral intake of 1200 mg of calcium with 800 IU of Vit D to maximize bone protection. Pt  to continue  with DEXA scan every 2 years to reassess. All qustions regarding this problem answered today to patient satisfaction.   Orders:  •  DXA bone density spine hip and pelvis; Future    Current moderate episode of major depressive disorder without prior episode (HCC)  Patient to continue utilizing medical therapy as well and counseling sources as applicable for condition. If  suicidal thought or fear of suicide to contact 911 and seek help immediately. Meds reviewed and patient questions answered today        Mixed hyperlipidemia  Patient  is stable with current medication and we discussed a low fat low cholesterol diet. Weight loss also discussed for this will help lower cholesterol also. Recheck lipids in 6 months.        Primary hypertension  Patient is stable with current anti-hypertensive medicine and continue to follow a low sodium diet and take current medication. All questions about this condition were answered today.        Restless legs syndrome (RLS)  Patient is stable  and will continue present plan of care and reassess at next routine visit. All questions about this problem from patient were answered today.        Transient alteration of awareness    Orders:  •  Ambulatory Referral to Neurology; Future  •  EEG Routine and awake; Future    Mold growth in home    Orders:  •  XR chest pa and lateral; Future  •  Ambulatory Referral to Pulmonology; Future  •  CBC and differential; Future  •  Comprehensive metabolic panel; Future          Depression Screening and  "Follow-up Plan: Patient's depression screening was positive with a PHQ-9 score of 7.   Clincally patient does not have depression. No treatment is required.   Falls Plan of Care: balance, strength, and gait training instructions were provided. Home safety education provided.     Urinary Incontinence Plan of Care: counseling topics discussed: practice Kegel (pelvic floor strengthening) exercises, use restroom every 2 hours, limit alcohol, caffeine, spicy foods, and acidic foods, limiting fluid intake 3-4 hours before bed, weight loss, preventing constipation and limiting fluid intake to 60 oz. per day.     History of Present Illness     79-year-old patient here today for multimedical months which osteoporosis and multiple new problems patient also has been with a chronic cough for about 2 years that she thought was due to allergies but she is learned that she has been exposed to black mold in her domicile that she is renting.  She is having issues with her landlord with black mold and is leaving the facility as soon as possible.  The home has been deemed unlivable until it is being remediated.  Patient would like to get evaluation for her respiratory status I will see about getting a chest x-ray CBC as well as a chemistry and I am going to see about referring her to see a pulmonologist for further evaluation of her respiratory status.  Patient also states that she is \"zoning out \"at times.  She states that she will be sitting there and for 30 seconds or so she will just lose track of time and she will come back she has no tremor she has no seizure activity but that she just zones out for 30 seconds and she comes back.  She has no confusion no postictal state will see if she is having any kind of seizure or any kind of may be absence seizure activity I will see about ordering EEG and we will see about getting her to see one of the neurologists for any kind of seizure type activity.  Will see patient back after she " "sees both of the specialist to round out what is going on with her currently.  Patient also for Prolia shot today for her osteoporosis which we will do for her this afternoon.      Review of Systems    Objective   /70 (BP Location: Left arm, Patient Position: Sitting, Cuff Size: Large)   Pulse 89   Temp 98.2 °F (36.8 °C) (Temporal)   Resp 16   Ht 5' 5\" (1.651 m)   Wt 77.1 kg (170 lb)   SpO2 96%   BMI 28.29 kg/m²      Physical Exam    "

## 2025-01-03 NOTE — ASSESSMENT & PLAN NOTE
Patient to continue with weight bearing exercises as much as possible and oral intake of 1200 mg of calcium with 800 IU of Vit D to maximize bone protection. Pt  to continue  with DEXA scan every 2 years to reassess. All qustions regarding this problem answered today to patient satisfaction.   Orders:  •  DXA bone density spine hip and pelvis; Future

## 2025-01-12 DIAGNOSIS — J30.1 SEASONAL ALLERGIC RHINITIS DUE TO POLLEN: ICD-10-CM

## 2025-01-12 DIAGNOSIS — J45.40 MODERATE PERSISTENT ASTHMA WITHOUT COMPLICATION: ICD-10-CM

## 2025-01-13 ENCOUNTER — APPOINTMENT (OUTPATIENT)
Dept: RADIOLOGY | Facility: CLINIC | Age: 80
End: 2025-01-13
Payer: COMMERCIAL

## 2025-01-13 DIAGNOSIS — Z77.120 MOLD GROWTH IN HOME: ICD-10-CM

## 2025-01-13 DIAGNOSIS — Z59.19 MOLD GROWTH IN HOME: ICD-10-CM

## 2025-01-13 PROCEDURE — 71046 X-RAY EXAM CHEST 2 VIEWS: CPT

## 2025-01-13 RX ORDER — FLUTICASONE PROPIONATE AND SALMETEROL XINAFOATE 115; 21 UG/1; UG/1
2 AEROSOL, METERED RESPIRATORY (INHALATION) 2 TIMES DAILY
Qty: 12 G | Refills: 2 | Status: SHIPPED | OUTPATIENT
Start: 2025-01-13

## 2025-01-13 RX ORDER — FLUTICASONE PROPIONATE 50 MCG
1 SPRAY, SUSPENSION (ML) NASAL DAILY
Qty: 16 G | Refills: 1 | Status: SHIPPED | OUTPATIENT
Start: 2025-01-13

## 2025-01-13 SDOH — ECONOMIC STABILITY - HOUSING INSECURITY: OTHER INADEQUATE HOUSING: Z59.19

## 2025-01-14 ENCOUNTER — RESULTS FOLLOW-UP (OUTPATIENT)
Dept: FAMILY MEDICINE CLINIC | Facility: CLINIC | Age: 80
End: 2025-01-14

## 2025-01-15 ENCOUNTER — APPOINTMENT (OUTPATIENT)
Dept: LAB | Facility: CLINIC | Age: 80
End: 2025-01-15
Payer: COMMERCIAL

## 2025-01-15 DIAGNOSIS — Z77.120 MOLD GROWTH IN HOME: ICD-10-CM

## 2025-01-15 DIAGNOSIS — Z59.19 MOLD GROWTH IN HOME: ICD-10-CM

## 2025-01-15 DIAGNOSIS — Z00.6 ENCOUNTER FOR EXAMINATION FOR NORMAL COMPARISON OR CONTROL IN CLINICAL RESEARCH PROGRAM: ICD-10-CM

## 2025-01-15 LAB
ALBUMIN SERPL BCG-MCNC: 4.5 G/DL (ref 3.5–5)
ALP SERPL-CCNC: 31 U/L (ref 34–104)
ALT SERPL W P-5'-P-CCNC: 16 U/L (ref 7–52)
ANION GAP SERPL CALCULATED.3IONS-SCNC: 9 MMOL/L (ref 4–13)
AST SERPL W P-5'-P-CCNC: 15 U/L (ref 13–39)
BASOPHILS # BLD AUTO: 0.04 THOUSANDS/ΜL (ref 0–0.1)
BASOPHILS NFR BLD AUTO: 1 % (ref 0–1)
BILIRUB SERPL-MCNC: 0.66 MG/DL (ref 0.2–1)
BUN SERPL-MCNC: 22 MG/DL (ref 5–25)
CALCIUM SERPL-MCNC: 9.6 MG/DL (ref 8.4–10.2)
CHLORIDE SERPL-SCNC: 101 MMOL/L (ref 96–108)
CO2 SERPL-SCNC: 30 MMOL/L (ref 21–32)
CREAT SERPL-MCNC: 0.97 MG/DL (ref 0.6–1.3)
EOSINOPHIL # BLD AUTO: 0.47 THOUSAND/ΜL (ref 0–0.61)
EOSINOPHIL NFR BLD AUTO: 7 % (ref 0–6)
ERYTHROCYTE [DISTWIDTH] IN BLOOD BY AUTOMATED COUNT: 12.3 % (ref 11.6–15.1)
GFR SERPL CREATININE-BSD FRML MDRD: 56 ML/MIN/1.73SQ M
GLUCOSE P FAST SERPL-MCNC: 108 MG/DL (ref 65–99)
HCT VFR BLD AUTO: 37 % (ref 34.8–46.1)
HGB BLD-MCNC: 12.4 G/DL (ref 11.5–15.4)
IMM GRANULOCYTES # BLD AUTO: 0.01 THOUSAND/UL (ref 0–0.2)
IMM GRANULOCYTES NFR BLD AUTO: 0 % (ref 0–2)
LYMPHOCYTES # BLD AUTO: 1.03 THOUSANDS/ΜL (ref 0.6–4.47)
LYMPHOCYTES NFR BLD AUTO: 16 % (ref 14–44)
MCH RBC QN AUTO: 31.6 PG (ref 26.8–34.3)
MCHC RBC AUTO-ENTMCNC: 33.5 G/DL (ref 31.4–37.4)
MCV RBC AUTO: 94 FL (ref 82–98)
MONOCYTES # BLD AUTO: 0.49 THOUSAND/ΜL (ref 0.17–1.22)
MONOCYTES NFR BLD AUTO: 8 % (ref 4–12)
NEUTROPHILS # BLD AUTO: 4.5 THOUSANDS/ΜL (ref 1.85–7.62)
NEUTS SEG NFR BLD AUTO: 68 % (ref 43–75)
NRBC BLD AUTO-RTO: 0 /100 WBCS
PLATELET # BLD AUTO: 328 THOUSANDS/UL (ref 149–390)
PMV BLD AUTO: 8.9 FL (ref 8.9–12.7)
POTASSIUM SERPL-SCNC: 3.8 MMOL/L (ref 3.5–5.3)
PROT SERPL-MCNC: 7.3 G/DL (ref 6.4–8.4)
RBC # BLD AUTO: 3.92 MILLION/UL (ref 3.81–5.12)
SODIUM SERPL-SCNC: 140 MMOL/L (ref 135–147)
WBC # BLD AUTO: 6.54 THOUSAND/UL (ref 4.31–10.16)

## 2025-01-15 PROCEDURE — 80053 COMPREHEN METABOLIC PANEL: CPT

## 2025-01-15 PROCEDURE — 36415 COLL VENOUS BLD VENIPUNCTURE: CPT

## 2025-01-15 PROCEDURE — 85025 COMPLETE CBC W/AUTO DIFF WBC: CPT

## 2025-01-15 SDOH — ECONOMIC STABILITY - HOUSING INSECURITY: OTHER INADEQUATE HOUSING: Z59.19

## 2025-01-16 ENCOUNTER — CONSULT (OUTPATIENT)
Dept: PULMONOLOGY | Facility: CLINIC | Age: 80
End: 2025-01-16
Payer: COMMERCIAL

## 2025-01-16 VITALS
HEART RATE: 92 BPM | SYSTOLIC BLOOD PRESSURE: 130 MMHG | BODY MASS INDEX: 26.99 KG/M2 | WEIGHT: 162.2 LBS | DIASTOLIC BLOOD PRESSURE: 70 MMHG | OXYGEN SATURATION: 99 %

## 2025-01-16 DIAGNOSIS — Z77.120 SUSPECTED EXPOSURE TO MOLD: ICD-10-CM

## 2025-01-16 DIAGNOSIS — R05.3 CHRONIC COUGH: Primary | ICD-10-CM

## 2025-01-16 PROBLEM — R93.89 ABNORMAL CXR: Status: RESOLVED | Noted: 2021-05-26 | Resolved: 2025-01-16

## 2025-01-16 PROCEDURE — 99204 OFFICE O/P NEW MOD 45 MIN: CPT | Performed by: INTERNAL MEDICINE

## 2025-01-16 PROCEDURE — G2211 COMPLEX E/M VISIT ADD ON: HCPCS | Performed by: INTERNAL MEDICINE

## 2025-01-16 RX ORDER — GUAIFENESIN/DEXTROMETHORPHAN 100-10MG/5
5 SYRUP ORAL 3 TIMES DAILY PRN
Qty: 118 ML | Refills: 0 | Status: SHIPPED | OUTPATIENT
Start: 2025-01-16

## 2025-01-16 NOTE — TELEPHONE ENCOUNTER
Patient called back for results. Message relayed. She verbalized understanding and will call to schedule follow up appointment after she sees her specialists as discussed with PCP.

## 2025-01-16 NOTE — ASSESSMENT & PLAN NOTE
Chronic cough for 4 years  Has elevated eos   Normal CXR  Large hernia can cause reflux and aspiration  Ddx includes GERD/aspiration from hiatal hernia, upper airway cough syndrome and cough variant asthma, nerve related refractory cough  Likley combination of all the above, though at this time refractory to standard treatment to ICS, nasal medicine     - eats small meals   - continue flonase and allergy OTC   - continue ICS/LABA  - will trial dextrometorphan syrup  --- if no improvement will try low dose gabapentin  Orders:    dextromethorphan-guaiFENesin (ROBITUSSIN DM)  mg/5 mL syrup; Take 5 mL by mouth 3 (three) times a day as needed for cough

## 2025-01-16 NOTE — PROGRESS NOTES
Name: Gale Mueller      : 1945      MRN: [id number]  Encounter Provider: Sarai Colvin MD  Encounter Date: 2025   Encounter department: [facility]  :  Assessment & Plan  Chronic cough  Chronic cough for 4 years  Has elevated eos   Normal CXR  Large hernia can cause reflux and aspiration  Ddx includes GERD/aspiration from hiatal hernia, upper airway cough syndrome and cough variant asthma, nerve related refractory cough  Likley combination of all the above, though at this time refractory to standard treatment to ICS, nasal medicine     - eats small meals   - continue flonase and allergy OTC   - continue ICS/LABA  - will trial dextrometorphan syrup  --- if no improvement will try low dose gabapentin  Orders:    dextromethorphan-guaiFENesin (ROBITUSSIN DM)  mg/5 mL syrup; Take 5 mL by mouth 3 (three) times a day as needed for cough    Suspected exposure to mold  CXR clear  No evidence of mold hypersensitivity pneumonitis  Mold was in bathroom not being used. Has a chronic cough (s above) and mild occasional runny nose. More commonly mold exposure can cause allergies or sinusitis but pt has no acute sinusitis symptoms  - continue allergy medicine      Orders:    Ambulatory Referral to Pulmonology    Follow up 2 months        History of Present Illness   HPI  Gale Mueller is a 79 y.o. female with a history of HTN, RLS, HLD, depression, who presents for evaluation for mold exposure.     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 found mold in upstairs bathroom, hadnt been used    Mild runny nose/congestion.     Takes advair for the cough for several years twice a day. Doesn't help much.  Tcough drops sometimes help           Objective   /70 (BP Location: Right arm, Patient Position: Sitting, Cuff Size: Standard)   Pulse 92   Wt 73.6 kg (162 lb 3.2 oz)   SpO2 99%   BMI  26.99 kg/m²        Exam:   Appearance -- NAD, speaking full sentences  Neuro -- A&Ox3, wnl  Heart -- RRR, no murmurs  Lungs -- CTAB  Abdomen -- soft, NTND,  Extremities -- WWP, no edema  Skin -- no rash    Imaging:    CXR 1/2025: clear lungs, large hiatal hernia    PFTs:    none    Other:      I have spent a total time of 45 minutes in caring for this patient on the day of the visit/encounter including Diagnostic results, Prognosis, Risks and benefits of tx options, Instructions for management, Patient and family education, Importance of tx compliance, Risk factor reductions, Impressions, and Counseling / Coordination of care.

## 2025-01-21 ENCOUNTER — TELEPHONE (OUTPATIENT)
Age: 80
End: 2025-01-21

## 2025-01-23 ENCOUNTER — HOSPITAL ENCOUNTER (OUTPATIENT)
Dept: NEUROLOGY | Facility: HOSPITAL | Age: 80
End: 2025-01-23
Payer: COMMERCIAL

## 2025-01-23 DIAGNOSIS — R40.4 TRANSIENT ALTERATION OF AWARENESS: ICD-10-CM

## 2025-01-23 PROCEDURE — 95816 EEG AWAKE AND DROWSY: CPT

## 2025-01-23 PROCEDURE — 95816 EEG AWAKE AND DROWSY: CPT | Performed by: PSYCHIATRY & NEUROLOGY

## 2025-01-27 ENCOUNTER — TELEPHONE (OUTPATIENT)
Age: 80
End: 2025-01-27

## 2025-01-27 LAB
APOB+LDLR+PCSK9 GENE MUT ANL BLD/T: NOT DETECTED
BRCA1+BRCA2 DEL+DUP + FULL MUT ANL BLD/T: NOT DETECTED
MLH1+MSH2+MSH6+PMS2 GN DEL+DUP+FUL M: NOT DETECTED

## 2025-01-27 NOTE — TELEPHONE ENCOUNTER
Left message reminding patient of appointment with Dr. Stinson, new office address and phone number.

## 2025-01-29 DIAGNOSIS — J45.40 MODERATE PERSISTENT ASTHMA WITHOUT COMPLICATION: ICD-10-CM

## 2025-01-29 RX ORDER — FLUTICASONE PROPIONATE AND SALMETEROL XINAFOATE 115; 21 UG/1; UG/1
2 AEROSOL, METERED RESPIRATORY (INHALATION) 2 TIMES DAILY
Qty: 12 G | Refills: 2 | Status: SHIPPED | OUTPATIENT
Start: 2025-01-29

## 2025-01-31 ENCOUNTER — HOSPITAL ENCOUNTER (OUTPATIENT)
Dept: RADIOLOGY | Facility: HOSPITAL | Age: 80
Discharge: HOME | End: 2025-01-31
Payer: COMMERCIAL

## 2025-01-31 DIAGNOSIS — M81.0 AGE-RELATED OSTEOPOROSIS WITHOUT CURRENT PATHOLOGICAL FRACTURE: ICD-10-CM

## 2025-01-31 PROCEDURE — 77080 DXA BONE DENSITY AXIAL: CPT

## 2025-02-03 ENCOUNTER — CONSULT (OUTPATIENT)
Age: 80
End: 2025-02-03
Payer: COMMERCIAL

## 2025-02-03 VITALS
WEIGHT: 159 LBS | HEART RATE: 101 BPM | BODY MASS INDEX: 26.49 KG/M2 | DIASTOLIC BLOOD PRESSURE: 72 MMHG | OXYGEN SATURATION: 95 % | SYSTOLIC BLOOD PRESSURE: 118 MMHG | HEIGHT: 65 IN

## 2025-02-03 DIAGNOSIS — R56.9 SEIZURE (HCC): ICD-10-CM

## 2025-02-03 DIAGNOSIS — R56.9 SEIZURE (HCC): Primary | ICD-10-CM

## 2025-02-03 DIAGNOSIS — R40.4 TRANSIENT ALTERATION OF AWARENESS: ICD-10-CM

## 2025-02-03 PROCEDURE — 99205 OFFICE O/P NEW HI 60 MIN: CPT

## 2025-02-03 RX ORDER — LEVETIRACETAM 500 MG/1
500 TABLET ORAL EVERY 12 HOURS SCHEDULED
Qty: 60 TABLET | Refills: 6 | Status: SHIPPED | OUTPATIENT
Start: 2025-02-03 | End: 2025-02-04

## 2025-02-03 NOTE — PROGRESS NOTES
"Name: Gale Mueller      : 1945      MRN: [id number]  Encounter Provider: Estrella Stinson MD  Encounter Date: 2/3/2025   Encounter department: [facility]  :  Assessment & Plan  Transient alteration of awareness    Orders:    Ambulatory Referral to Neurology    Seizure (HCC)    Orders:    levETIRAcetam (Keppra) 500 mg tablet; Take 1 tablet (500 mg total) by mouth every 12 (twelve) hours    MRI brain with and without contrast; Future    EEG awake or drowsy routine; Future    Patient is a 79 year old right handed female with PMH of HTN, depression, restless leg syndrome,  who presents for evaluation of possible seizures.     Starting 2024, she has had about 15 different episodes of what she described as \"Zoning out\". During episodes, she will have a slight head turn to left (45 degrees) and down, and stop talking. Episodes last between 30 seconds to one minute. During episodes, she is not able to move or talk. She does not have any rhythmic shaking. Denies loss of consciousness. Denies urinary incontinence during these episodes. Denies tongue biting. Denies any post ictal confusion or fatigue.     EEG done on 2025 is normal. She does not drive.    Based on history, patient possibly has focal impaired awareness seizures. I will repeat a routine EEG to evaluate for epileptiform discharges. In the future, can consider an ambulatory EEG or EMU admission to classify events.      Plan:  - Start Keppra 500mg BID  - Ordered routine EEG and MRI Brain with and without contrast  - Encouraged to keep a seizure diary  - Follow up in 3 months          History of Present Illness   HPI  Gale Mueller is a 79 year old right handed female with PMH of HTN, depression, restless leg syndrome,  who presents for evaluation of possible seizures,.     Starting 2024, she has had about 15 different episodes of what she described as \"Zoning out\". During episodes, she will have " a slight head turn to left (45 degrees) and down, and stop talking. Episodes last between 30 seconds to one minute. During episodes, she is not able to move or talk. She does not have any rhythmic shaking. Denies loss of consciousness. Denies urinary incontinence during these episodes. Denies tongue biting. Denies any post ictal confusion or fatigue. Denies any aura.     Last episode occurred two weeks ago. Prior to the episode, she was having a conversation with a friend. Her friend then noticed that she stopped responding and turned her head to the left for about 30 seconds. During the episode, patient could hear what the friend was saying, but she could not respond. After the episode she was able to go on with the conversation. Friend did not notice any shaking or facial movements. Friend did not notice any loss of consciousness. Patient is able to remember the conversation.     EEG done on 1/23/2025 is normal.     Denies history of seizures in herself or family. Denies history of stroke. Denies TBI. Normal birth and development.     She has been taking pramipexole for 5-6 years for restless leg syndrome.     Event/Seizure semiology:  Event type 1: decreased responsiveness   - Frequency/Date of last event: 1/15/2024  - Warning/Aura: no  - Loss of awareness: no  - Amnestic to the event: no  - Semiology: head turns 45 degrees left and down  - Presence of post-ictal period: no   - Sonorus breathing: no  - Incontinence: no  - Tongue biting: no      Special Features  Age/Date of seizure onset: August 2024  Status epilepticus: no  Self Injury Seizures: no  Precipitating Factors:  none  Longest seizure free interval: 2 weeks    Epilepsy Risk Factors:  None    Current AEDs:  none  Medication side effects: None  Medication adherence: none    Prior AEDs:  None    Prior Evaluation:  - routine EEG: yes - normal  - cEEG/EMU: no  - MRI brain: no  - PET: no  - fMRI: no  - Ictal SPECT: no  - Neuropsych evaluation: no  - Other:  "    History Reviewed:   The following were reviewed and updated as appropriate: allergies, current medications, past family history, past medical history, past social history, past surgical history, and problem list     Psychiatric History:  None    Social History:   Driving: No  Lives Alone: Yes  Occupation: works as a home health aide          Review of Systems   Constitutional:  Negative for appetite change, fatigue and fever.   HENT: Negative.  Negative for hearing loss, tinnitus, trouble swallowing and voice change.    Eyes: Negative.  Negative for photophobia, pain and visual disturbance.   Respiratory: Negative.  Negative for shortness of breath.    Cardiovascular: Negative.  Negative for palpitations.   Gastrointestinal: Negative.  Negative for nausea and vomiting.   Endocrine: Negative.  Negative for cold intolerance.   Genitourinary: Negative.  Negative for dysuria, frequency and urgency.   Musculoskeletal:  Negative for back pain, gait problem, myalgias, neck pain and neck stiffness.   Skin: Negative.  Negative for rash.   Allergic/Immunologic: Negative.    Neurological: Negative.  Negative for dizziness, tremors, seizures, syncope, facial asymmetry, speech difficulty, weakness, light-headedness, numbness and headaches.        Had an episode about 2 weeks ago-head slumped to the left side-episodes last roughly 30-45 seconds.   Hematological: Negative.  Does not bruise/bleed easily.   Psychiatric/Behavioral: Negative.  Negative for confusion, hallucinations and sleep disturbance.     I have personally reviewed the MA's review of systems and made changes as necessary.         Objective   There were no vitals taken for this visit.    Physical Exam  Neurological Exam    /72 (Patient Position: Sitting, Cuff Size: Adult)   Pulse 101   Ht 5' 5\" (1.651 m)   Wt 72.1 kg (159 lb)   SpO2 95%   BMI 26.46 kg/m²      General Exam  General: well developed, no acute distress.  HEENT: mucous membranes moist, " anicteric sclera.   Neck: supple, good ROM.      Neurological Exam  Mental Status: awake, alert, and fully oriented to person, place, time, and situation. Attention and memory intact. Fund of knowledge is appropriate for age and education.  There is no neglect.    Language: fluency, and comprehension normal.       Cranial Nerves: Pupils equal and reactive to light.  Visual fields full to confrontation. Extraocular motions intact with full versions, normal pursuits and saccades. Facial strength full and symmetric. Facial sensation intact in V1-V3. Hearing intact to voice. Tongue protrudes to midline. Palate elevates symmetrically. Speech clear without notable dysarthria. Shoulder shrug activation full and symmetric.    Motor: Normal bulk and tone. No pronator drift. Strength is 5/5 proximally and distally in all 4 extremities. No involuntary movements.    Sensory: Sensation intact to light touch distally in all extremities.    Coordination: Normal finger-to-nose. Normal rapid alternating movements.     Station and gait: Casual and tandem gait normal. Normal Romberg.    Reflexes: Reflexes 1+ throughout and symmetric.     Administrative Statements   I have spent a total time of 60 minutes in caring for this patient on the day of the visit/encounter including Diagnostic results, Prognosis, Risks and benefits of tx options, Importance of tx compliance, Counseling / Coordination of care, and Reviewing / ordering tests, medicine, procedures  .

## 2025-02-03 NOTE — TELEPHONE ENCOUNTER
Call made to patient to see if she would like to come in sooner today, patient will come in before her 3 pm appointment.

## 2025-02-04 RX ORDER — LEVETIRACETAM 500 MG/1
500 TABLET ORAL EVERY 12 HOURS
Qty: 180 TABLET | Refills: 1 | Status: SHIPPED | OUTPATIENT
Start: 2025-02-04

## 2025-02-18 ENCOUNTER — TELEPHONE (OUTPATIENT)
Age: 80
End: 2025-02-18

## 2025-02-18 DIAGNOSIS — R05.3 CHRONIC COUGH: Primary | ICD-10-CM

## 2025-02-18 RX ORDER — GABAPENTIN 300 MG/1
300 CAPSULE ORAL
Qty: 30 CAPSULE | Refills: 2 | Status: SHIPPED | OUTPATIENT
Start: 2025-02-18 | End: 2025-05-19

## 2025-02-18 NOTE — TELEPHONE ENCOUNTER
Called pt back. No answer, left VM    Plan was to start trial fo gabapentin 300mg qhs if dextromethorphan did not help cough    Sent to pharmacy

## 2025-02-20 ENCOUNTER — HOSPITAL ENCOUNTER (OUTPATIENT)
Dept: NEUROLOGY | Facility: HOSPITAL | Age: 80
End: 2025-02-20
Payer: COMMERCIAL

## 2025-02-20 DIAGNOSIS — R56.9 SEIZURE (HCC): ICD-10-CM

## 2025-02-20 PROCEDURE — 95816 EEG AWAKE AND DROWSY: CPT

## 2025-02-26 DIAGNOSIS — I10 PRIMARY HYPERTENSION: ICD-10-CM

## 2025-02-27 RX ORDER — CHLORTHALIDONE 50 MG/1
50 TABLET ORAL DAILY
Qty: 90 TABLET | Refills: 1 | Status: SHIPPED | OUTPATIENT
Start: 2025-02-27

## 2025-02-27 NOTE — PROGRESS NOTES
Name: Gale Mueller      : 1945      MRN: [id number]  Encounter Provider: Jameson Frank MD  Encounter Date: 2025   Encounter department: [facility]  :  Assessment & Plan  Age-related osteoporosis without current pathological fracture  Patient to continue with weight bearing exercises as much as possible and oral intake of 1200 mg of calcium with 800 IU of Vit D to maximize bone protection. Pt  to continue  with DEXA scan every 2 years to reassess. All qustions regarding this problem answered today to patient satisfaction.        Current moderate episode of major depressive disorder without prior episode (HCC)  Patient to continue utilizing medical therapy as well and counseling sources as applicable for condition. If  suicidal thought or fear of suicide to contact 911 and seek help immediately. Meds reviewed and patient questions answered today        Mixed hyperlipidemia  Patient  is stable with current medication and we discussed a low fat low cholesterol diet. Weight loss also discussed for this will help lower cholesterol also. Recheck lipids in 6 months.        Primary hypertension  Patient is stable with current anti-hypertensive medicine and continue to follow a low sodium diet and take current medication. All questions about this condition were answered today.   Orders:  •  quinapril (ACCUPRIL) 40 MG tablet; Take 1 tablet (40 mg total) by mouth daily at bedtime    Restless legs syndrome (RLS)  Patient is stable  and will continue present plan of care and reassess at next routine visit. All questions about this problem from patient were answered today.              Falls Plan of Care: balance, strength, and gait training instructions were provided. Home safety education provided.     Urinary Incontinence Plan of Care: counseling topics discussed: practice Kegel (pelvic floor strengthening) exercises, use restroom every 2 hours, limit alcohol, caffeine, spicy  foods, and acidic foods, limiting fluid intake 3-4 hours before bed, weight loss, preventing constipation and limiting fluid intake to 60 oz. per day.     ASCVD Risk Management:  The 10-year ASCVD risk score (Krystal FRANCISCO, et al., 2019) is: 23.4%    Patent does not have underlying ASCVD. Their ASCVD Risk is high (>= 20%).    History of Present Illness   9-year-old female today for checkup on multimedical Bosi patient history of depression anxiety hypertension as well as these episodes of blacking out that is under investigation by neurology.  Patient had EEG done x 2 and does not show any significant signs of any kind of seizure activity.  Patient was empirically started on some Keppra by neurology but she does stop it due to side effects.  Patient will be following up with neurology within the next month to see the neck step in the evaluation of this process.  Patient states she has no more episodes of blacking out but she states that that she has been having these problems that started after she was having landlord renter problems.  Patiently recently was evicted from her place of residence by her landlord and these dropping out episodes started happening when that was all going on.  Patient is now find a place to live and is still under a lot of stress in regards to this matter.  I wonder if there may have been some stress induction of these blacking out episodes.  Patient is depressed and what I see about getting her started on some Lexapro and give her some Ativan for breakthrough anxiety as she needs it.  She will discuss this with her neurologist as the etiology for these blacking out episodes as a possibility.  Also would like to get back on quinapril for blood pressure medicine and we will make that change for today.  Will see her back in approximately 6 weeks to reassess her for the Lexapro and the utilization as well as the change in blood pressure medicine and also to follow-up on her blacking out  "episodes.      Review of Systems   Constitutional:  Negative for activity change, appetite change, fatigue and fever.   HENT:  Negative for congestion, ear pain, postnasal drip, rhinorrhea, sinus pressure, sinus pain, sneezing and sore throat.    Eyes:  Negative for pain and redness.   Respiratory:  Negative for apnea, cough, chest tightness, shortness of breath and wheezing.    Cardiovascular:  Negative for chest pain, palpitations and leg swelling.   Gastrointestinal:  Negative for abdominal pain, constipation, diarrhea, nausea and vomiting.   Endocrine: Negative for cold intolerance and heat intolerance.   Genitourinary:  Negative for difficulty urinating, dysuria, frequency, hematuria and urgency.   Musculoskeletal:  Negative for arthralgias, back pain, gait problem and myalgias.   Skin:  Negative for rash.   Neurological:  Negative for dizziness, speech difficulty, weakness, numbness and headaches.   Hematological:  Does not bruise/bleed easily.   Psychiatric/Behavioral:  Negative for agitation, confusion and hallucinations.        Objective   /66 (BP Location: Left arm, Patient Position: Sitting, Cuff Size: Standard)   Pulse 93   Temp 98.1 °F (36.7 °C) (Skin)   Ht 5' 5\" (1.651 m)   Wt 71.2 kg (157 lb)   SpO2 95%   BMI 26.13 kg/m²      Physical Exam  Constitutional:       Appearance: Normal appearance. She is not ill-appearing.   HENT:      Head: Normocephalic and atraumatic.      Right Ear: Tympanic membrane normal.      Left Ear: Tympanic membrane normal.      Nose: Nose normal.      Mouth/Throat:      Mouth: Mucous membranes are moist.   Eyes:      Extraocular Movements: Extraocular movements intact.      Conjunctiva/sclera: Conjunctivae normal.      Pupils: Pupils are equal, round, and reactive to light.   Cardiovascular:      Rate and Rhythm: Normal rate and regular rhythm.   Pulmonary:      Effort: Pulmonary effort is normal. No respiratory distress.      Breath sounds: Normal breath sounds. " No wheezing.   Abdominal:      General: Bowel sounds are normal.      Palpations: Abdomen is soft.      Tenderness: There is no abdominal tenderness.   Musculoskeletal:         General: No tenderness. Normal range of motion.      Cervical back: Normal range of motion and neck supple.      Right lower leg: No edema.      Left lower leg: No edema.   Skin:     General: Skin is warm and dry.   Neurological:      Mental Status: She is alert and oriented to person, place, and time.   Psychiatric:         Mood and Affect: Mood normal.         Behavior: Behavior normal.         Thought Content: Thought content normal.         Judgment: Judgment normal.

## 2025-02-27 NOTE — ASSESSMENT & PLAN NOTE
Patient is stable with current anti-hypertensive medicine and continue to follow a low sodium diet and take current medication. All questions about this condition were answered today.   Orders:  •  quinapril (ACCUPRIL) 40 MG tablet; Take 1 tablet (40 mg total) by mouth daily at bedtime

## 2025-02-28 ENCOUNTER — OFFICE VISIT (OUTPATIENT)
Dept: FAMILY MEDICINE CLINIC | Facility: CLINIC | Age: 80
End: 2025-02-28
Payer: COMMERCIAL

## 2025-02-28 ENCOUNTER — HOSPITAL ENCOUNTER (OUTPATIENT)
Dept: MRI IMAGING | Facility: HOSPITAL | Age: 80
Discharge: HOME | End: 2025-02-28
Payer: COMMERCIAL

## 2025-02-28 VITALS
HEART RATE: 93 BPM | DIASTOLIC BLOOD PRESSURE: 66 MMHG | TEMPERATURE: 98.1 F | HEIGHT: 65 IN | OXYGEN SATURATION: 95 % | WEIGHT: 157 LBS | BODY MASS INDEX: 26.16 KG/M2 | SYSTOLIC BLOOD PRESSURE: 122 MMHG

## 2025-02-28 DIAGNOSIS — I10 PRIMARY HYPERTENSION: ICD-10-CM

## 2025-02-28 DIAGNOSIS — M81.0 AGE-RELATED OSTEOPOROSIS WITHOUT CURRENT PATHOLOGICAL FRACTURE: Primary | ICD-10-CM

## 2025-02-28 DIAGNOSIS — R56.9 SEIZURE (HCC): ICD-10-CM

## 2025-02-28 DIAGNOSIS — F32.1 CURRENT MODERATE EPISODE OF MAJOR DEPRESSIVE DISORDER WITHOUT PRIOR EPISODE (HCC): ICD-10-CM

## 2025-02-28 DIAGNOSIS — G25.81 RESTLESS LEGS SYNDROME (RLS): ICD-10-CM

## 2025-02-28 DIAGNOSIS — E78.2 MIXED HYPERLIPIDEMIA: ICD-10-CM

## 2025-02-28 PROCEDURE — G2211 COMPLEX E/M VISIT ADD ON: HCPCS | Performed by: FAMILY MEDICINE

## 2025-02-28 PROCEDURE — 70553 MRI BRAIN STEM W/O & W/DYE: CPT

## 2025-02-28 PROCEDURE — A9585 GADOBUTROL INJECTION: HCPCS

## 2025-02-28 PROCEDURE — 99214 OFFICE O/P EST MOD 30 MIN: CPT | Performed by: FAMILY MEDICINE

## 2025-02-28 RX ORDER — QUINAPRIL 40 MG/1
40 TABLET ORAL
Qty: 90 TABLET | Refills: 1 | Status: SHIPPED | OUTPATIENT
Start: 2025-02-28 | End: 2025-03-05 | Stop reason: SDUPTHER

## 2025-02-28 RX ORDER — LORAZEPAM 0.5 MG/1
0.5 TABLET ORAL EVERY 8 HOURS PRN
Qty: 60 TABLET | Refills: 1 | Status: SHIPPED | OUTPATIENT
Start: 2025-02-28

## 2025-02-28 RX ORDER — ESCITALOPRAM OXALATE 10 MG/1
10 TABLET ORAL DAILY
Qty: 30 TABLET | Refills: 1 | Status: SHIPPED | OUTPATIENT
Start: 2025-02-28

## 2025-02-28 RX ORDER — GADOBUTROL 604.72 MG/ML
7 INJECTION INTRAVENOUS
Status: COMPLETED | OUTPATIENT
Start: 2025-02-28 | End: 2025-02-28

## 2025-02-28 RX ADMIN — GADOBUTROL 7 ML: 604.72 INJECTION INTRAVENOUS at 20:45

## 2025-03-05 DIAGNOSIS — I10 PRIMARY HYPERTENSION: ICD-10-CM

## 2025-03-05 RX ORDER — QUINAPRIL 40 MG/1
40 TABLET ORAL
Qty: 90 TABLET | Refills: 1 | Status: SHIPPED | OUTPATIENT
Start: 2025-03-05

## 2025-03-13 ENCOUNTER — TELEPHONE (OUTPATIENT)
Age: 80
End: 2025-03-13

## 2025-03-13 NOTE — TELEPHONE ENCOUNTER
Due to her being 79 with other medical problems, I would probably avoid the beta blocker metoprolol for now.  She needs an OV to discuss a decision on a new blood pressure medication for her if her pharmacy cannot get her quinapril.

## 2025-03-13 NOTE — TELEPHONE ENCOUNTER
Patient called in stating Quinapril is no longer being made/available. She would like to know why she was taken off the Metoprolol and if it's not a major health concern she would like that sent in to replace the Quinapril if possible. She also wanted to remind Dr. Frank that she stopped the Lisinopril because it was making her cough. Please advise. Thank you.

## 2025-03-25 ENCOUNTER — PATIENT MESSAGE (OUTPATIENT)
Age: 80
End: 2025-03-25

## 2025-04-10 ENCOUNTER — RA CDI HCC (OUTPATIENT)
Dept: OTHER | Facility: HOSPITAL | Age: 80
End: 2025-04-10

## 2025-04-11 ENCOUNTER — TELEMEDICINE (OUTPATIENT)
Dept: FAMILY MEDICINE CLINIC | Facility: CLINIC | Age: 80
End: 2025-04-11
Payer: COMMERCIAL

## 2025-04-11 DIAGNOSIS — I10 PRIMARY HYPERTENSION: ICD-10-CM

## 2025-04-11 DIAGNOSIS — I10 PRIMARY HYPERTENSION: Primary | ICD-10-CM

## 2025-04-11 PROCEDURE — G2211 COMPLEX E/M VISIT ADD ON: HCPCS | Performed by: FAMILY MEDICINE

## 2025-04-11 PROCEDURE — 99214 OFFICE O/P EST MOD 30 MIN: CPT | Performed by: FAMILY MEDICINE

## 2025-04-11 RX ORDER — VALSARTAN 160 MG/1
160 TABLET ORAL DAILY
Qty: 30 TABLET | Refills: 1 | Status: SHIPPED | OUTPATIENT
Start: 2025-04-11 | End: 2025-04-14

## 2025-04-11 NOTE — PROGRESS NOTES
Virtual Brief Visit  Name: Gale Mueller      : 1945      MRN: [id number]  Encounter Provider: Jameson Frank MD  Encounter Date: 2025   Encounter department: [facility]  :  Assessment & Plan  Primary hypertension    Orders:  •  valsartan (DIOVAN) 160 mg tablet; Take 1 tablet (160 mg total) by mouth daily        History of Present Illness   Patient for virtual visit for checkup on blood pressure as well as her depression.  Patient recently had been not able to get a hold of some quinapril and has been stopped on her with metoprolol due to history of some asthma.  Patient will need to restart blood pressure medicine for blood pressure which has been in the 140/80 range that she has been taking care of her at home.  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  Her approved she will start her on some Diovan 160 mg once a day and recheck her back in approximately 6 weeks.  Patient also has been taking Lexapro for some depression which has been having some problems with her living arrangements and she is currently living with a neighbor and her old house has been sold by her landlord.  We will see her back in 6 weeks also to reassess her for her depression.        Administrative Statements   Encounter provider Jameson Frank MD    The Patient is located at Home and in the following state in which I hold an active license [place].    The patient was identified by name and date of birth. Gale Mueller was informed that this is a telemedicine visit and that the visit is being conducted through Telephone.  My office door was closed. No one else was in the room.  She acknowledged consent and understanding of privacy and security of the video platform. The patient has agreed to participate and understands they can discontinue the visit at any time.    I have spent a total time of 15   minutes in caring for this patient on the day of the visit/encounter  including Prognosis and Impressions, not including the time spent for establishing the audio/video connection.

## 2025-04-14 ENCOUNTER — PATIENT MESSAGE (OUTPATIENT)
Age: 80
End: 2025-04-14

## 2025-04-14 RX ORDER — VALSARTAN 160 MG/1
160 TABLET ORAL DAILY
Qty: 90 TABLET | Refills: 1 | Status: SHIPPED | OUTPATIENT
Start: 2025-04-14

## 2025-04-18 NOTE — PATIENT COMMUNICATION
Called patient per request and could not take any morning appointments due to still working.      She moved her appt from 5/15 to 5/13     She also stated she has not had any episodes and is okay to wait until may  Advised to give us a call for any worsening symptoms

## 2025-04-29 ENCOUNTER — OFFICE VISIT (OUTPATIENT)
Dept: PULMONOLOGY | Facility: CLINIC | Age: 80
End: 2025-04-29
Payer: COMMERCIAL

## 2025-04-29 VITALS
HEART RATE: 86 BPM | TEMPERATURE: 98.4 F | BODY MASS INDEX: 26.29 KG/M2 | DIASTOLIC BLOOD PRESSURE: 82 MMHG | SYSTOLIC BLOOD PRESSURE: 142 MMHG | OXYGEN SATURATION: 96 % | WEIGHT: 158 LBS

## 2025-04-29 DIAGNOSIS — R05.3 CHRONIC COUGH: Primary | ICD-10-CM

## 2025-04-29 PROCEDURE — G2211 COMPLEX E/M VISIT ADD ON: HCPCS | Performed by: INTERNAL MEDICINE

## 2025-04-29 PROCEDURE — 99214 OFFICE O/P EST MOD 30 MIN: CPT | Performed by: INTERNAL MEDICINE

## 2025-04-29 RX ORDER — GABAPENTIN 300 MG/1
300 CAPSULE ORAL
Qty: 30 CAPSULE | Refills: 5 | Status: SHIPPED | OUTPATIENT
Start: 2025-04-29 | End: 2025-10-26

## 2025-04-29 NOTE — PROGRESS NOTES
Name: Gale Mueller      : 1945      MRN: [id number]  Encounter Provider: Sarai Colvin MD  Encounter Date: 2025   Encounter department: [facility]  :  Assessment & Plan  Chronic cough  Chronic cough for 4 years  Has elevated eos   Normal CXR  Large hernia can cause reflux and aspiration  Ddx includes GERD/aspiration from hiatal hernia, upper airway cough syndrome and cough variant asthma, nerve related refractory cough  Likley combination of all the above, though at this time refractory to standard treatment to ICS, nasal medicine   No response to dextromethorphan syrup so started on gabapentin 300mg nightly which has helped her cough significantly     - eats small meals   - continue flonase and allergy OTC   - stopped ICS/LABA   - continue gabapentin 300mg at nighttime, refilled  --- if feeling well in 6 months, will trial weaning down to 200mg dose  - ACE-I changed to ARB  Orders:    gabapentin (Neurontin) 300 mg capsule; Take 1 capsule (300 mg total) by mouth daily at bedtime          History of Present Illness   General  Associated symptoms include coughing. Pertinent negatives include no chest pain, myalgias or sore throat.     Gale Mueller is a 79 y.o. female with a history of HTN, RLS, HLD, depression, who presents for evaluation for mold exposure.     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 found mold in upstairs bathroom, hadnt been used    Mild runny nose/congestion.   Takes advair for the cough for several years twice a day. Doesn't help much.  Tcough drops sometimes help    Interval Hx 25  No improvement with initial dextromethorphan syrup so sent gabapentin instead. Reports significant improvement w cough       Objective   /82   Pulse 86   Temp 98.4 °F (36.9 °C)   Wt 71.7 kg (158 lb)   SpO2 96%   BMI 26.29 kg/m²        Exam:   Appearance -- NAD,  speaking full sentences  Neuro -- A&Ox3, wnl  Heart -- RRR, no murmurs  Lungs -- CTAB  Abdomen -- soft, NTND,  Extremities -- WWP, no edema  Skin -- no rash    Imaging:    CXR 1/2025: clear lungs, large hiatal hernia    PFTs:    none    Other:      I have spent a total time of 45 minutes in caring for this patient on the day of the visit/encounter including Diagnostic results, Prognosis, Risks and benefits of tx options, Instructions for management, Patient and family education, Importance of tx compliance, Risk factor reductions, Impressions, and Counseling / Coordination of care.

## 2025-04-29 NOTE — ASSESSMENT & PLAN NOTE
Chronic cough for 4 years  Has elevated eos   Normal CXR  Large hernia can cause reflux and aspiration  Ddx includes GERD/aspiration from hiatal hernia, upper airway cough syndrome and cough variant asthma, nerve related refractory cough  Likley combination of all the above, though at this time refractory to standard treatment to ICS, nasal medicine   No response to dextromethorphan syrup so started on gabapentin 300mg nightly which has helped her cough significantly     - eats small meals   - continue flonase and allergy OTC   - stopped ICS/LABA   - continue gabapentin 300mg at nighttime, refilled  --- if feeling well in 6 months, will trial weaning down to 200mg dose  - ACE-I changed to ARB  Orders:    gabapentin (Neurontin) 300 mg capsule; Take 1 capsule (300 mg total) by mouth daily at bedtime

## 2025-05-13 ENCOUNTER — NURSE TRIAGE (OUTPATIENT)
Age: 80
End: 2025-05-13

## 2025-05-13 ENCOUNTER — OFFICE VISIT (OUTPATIENT)
Age: 80
End: 2025-05-13
Payer: COMMERCIAL

## 2025-05-13 ENCOUNTER — TELEPHONE (OUTPATIENT)
Age: 80
End: 2025-05-13

## 2025-05-13 VITALS
HEIGHT: 65 IN | HEART RATE: 87 BPM | WEIGHT: 158 LBS | BODY MASS INDEX: 26.33 KG/M2 | DIASTOLIC BLOOD PRESSURE: 100 MMHG | SYSTOLIC BLOOD PRESSURE: 150 MMHG

## 2025-05-13 DIAGNOSIS — I10 PRIMARY HYPERTENSION: ICD-10-CM

## 2025-05-13 DIAGNOSIS — R56.9 SEIZURE (HCC): Primary | ICD-10-CM

## 2025-05-13 PROCEDURE — G2211 COMPLEX E/M VISIT ADD ON: HCPCS

## 2025-05-13 PROCEDURE — 99213 OFFICE O/P EST LOW 20 MIN: CPT

## 2025-05-13 RX ORDER — VALSARTAN 320 MG/1
320 TABLET ORAL DAILY
Qty: 30 TABLET | Refills: 1 | Status: SHIPPED | OUTPATIENT
Start: 2025-05-13 | End: 2025-05-14

## 2025-05-13 NOTE — TELEPHONE ENCOUNTER
Call pt. Blood pressure is still too high. She needs to double the dose by taking 2 tabs for a total of 320mg  daily. I will call in a new script for the current one will  too quickly and the pharmacy will not refill without a new script with the higher dose.

## 2025-05-13 NOTE — TELEPHONE ENCOUNTER
Regarding: BP  ----- Message from Daniela KUMARI sent at 5/13/2025  4:51 PM EDT -----  Spoke with patient, patient stated her BP was 150/100. Patient is currently taking Valsartan 160mg. Patient would like to speak with some one in regards to her BP. Patient's good call back number is [phone].  No symptoms of sob or chest pains. Please advise.

## 2025-05-13 NOTE — ASSESSMENT & PLAN NOTE
"       Patient is a 79 year old right handed female with PMH of HTN, depression, restless leg syndrome,  who presents for follow up of seizures.    Denies any seizure-like episodes since last visit. She stopped taking keppra after 2 weeks because it made her feel dizzy. She thinks the episodes of \"zoning out\" may be due to increased stress. She has since moved out and her stress levels have gone down. She is not interested in starting another medication.      MRI Brain shows chronic microangiopathy. EEG 2/20/2025 is negative for epileptiform discharges or focal dysfunction.    Starting August 2024, she has had about 15 different episodes of what she described as \"Zoning out\". During episodes, she will have a slight head turn to left (45 degrees) and down, and stop talking. Episodes last between 30 seconds to one minute. During episodes, she is not able to move or talk. She does not have any rhythmic shaking. Denies loss of consciousness. Denies urinary incontinence during these episodes. Denies tongue biting. Denies any post ictal confusion or fatigue.      She does not drive.     In the past, there was suspicion for possible focal impaired awareness seizures. However, patient was in a stressful environment at that time, and since moving, she has not had any episodes. It is possible that the episodes that she had were stress-induced/non-epileptic. She has stopped keppra due to side effects of dizziness. We discussed the possibility to starting a different AED that she could tolerate better. However, patient does not want to start a new medication because she believes the episodes are stress induced. She does not drive.      Plan:  - Please call our office or seek medical attention if you have any new seizure-like episodes  - Encouraged to keep a seizure diary  - Follow up in 6 months  "

## 2025-05-13 NOTE — PROGRESS NOTES
"Name: Gale Mueller      : 1945      MRN: [id number]  Encounter Provider: Estrella Stinson MD  Encounter Date: 2025   Encounter department: [facility]  :  Assessment & Plan  Seizure (HCC)         Patient is a 79 year old right handed female with PMH of HTN, depression, restless leg syndrome,  who presents for follow up of seizures.    Denies any seizure-like episodes since last visit. She stopped taking keppra after 2 weeks because it made her feel dizzy. She thinks the episodes of \"zoning out\" may be due to increased stress. She has since moved out and her stress levels have gone down. She is not interested in starting another medication.      MRI Brain shows chronic microangiopathy. EEG 2025 is negative for epileptiform discharges or focal dysfunction.    Starting 2024, she has had about 15 different episodes of what she described as \"Zoning out\". During episodes, she will have a slight head turn to left (45 degrees) and down, and stop talking. Episodes last between 30 seconds to one minute. During episodes, she is not able to move or talk. She does not have any rhythmic shaking. Denies loss of consciousness. Denies urinary incontinence during these episodes. Denies tongue biting. Denies any post ictal confusion or fatigue.      She does not drive.     In the past, there was suspicion for possible focal impaired awareness seizures. However, patient was in a stressful environment at that time, and since moving, she has not had any episodes. It is possible that the episodes that she had were stress-induced/non-epileptic. She has stopped keppra due to side effects of dizziness. We discussed the possibility to starting a different AED that she could tolerate better. However, patient does not want to start a new medication because she believes the episodes are stress induced. She does not drive.      Plan:  - Please call our office or seek medical " "attention if you have any new seizure-like episodes  - Encouraged to keep a seizure diary  - Follow up in 6 months      History of Present Illness   HPI     Patient is a 79 year old right handed female with PMH of HTN, depression, restless leg syndrome,  who presents for follow up of seizures. During last visit, she was told to start keppra, get MRI brain and routine EEG.     MRI Brain shows chronic microangiopathy. EEG 2/20/2025 is negative for epileptiform discharges or focal dysfunction.    Denies any seizure-like episodes since last visit. She stopped taking keppra after 2 weeks because it made her feel dizzy. She thinks the episodes of \"zoning out\" may be due to increased stress. She has since moved out and her stress levels have gone down. She is not interested in starting another medication.      Starting August 2024, she has had about 15 different episodes of what she described as \"Zoning out\". During episodes, she will have a slight head turn to left (45 degrees) and down, and stop talking. Episodes last between 30 seconds to one minute. During episodes, she is not able to move or talk. She does not have any rhythmic shaking. Denies loss of consciousness. Denies urinary incontinence during these episodes. Denies tongue biting. Denies any post ictal confusion or fatigue.      EEG done on 1/23/2025 is normal. She does not drive.    Intake note from 2/3/2025:  Gale Mueller is a 79 year old right handed female with PMH of HTN, depression, restless leg syndrome,  who presents for evaluation of possible seizures,.      Starting August 2024, she has had about 15 different episodes of what she described as \"Zoning out\". During episodes, she will have a slight head turn to left (45 degrees) and down, and stop talking. Episodes last between 30 seconds to one minute. During episodes, she is not able to move or talk. She does not have any rhythmic shaking. Denies loss of consciousness. Denies urinary incontinence " during these episodes. Denies tongue biting. Denies any post ictal confusion or fatigue. Denies any aura.      Last episode occurred two weeks ago. Prior to the episode, she was having a conversation with a friend. Her friend then noticed that she stopped responding and turned her head to the left for about 30 seconds. During the episode, patient could hear what the friend was saying, but she could not respond. After the episode she was able to go on with the conversation. Friend did not notice any shaking or facial movements. Friend did not notice any loss of consciousness. Patient is able to remember the conversation.      EEG done on 1/23/2025 is normal.      Denies history of seizures in herself or family. Denies history of stroke. Denies TBI. Normal birth and development.      She has been taking pramipexole for 5-6 years for restless leg syndrome.      Event/Seizure semiology:  Event type 1: decreased responsiveness   - Frequency/Date of last event: 1/15/2024  - Warning/Aura: no  - Loss of awareness: no  - Amnestic to the event: no  - Semiology: head turns 45 degrees left and down  - Presence of post-ictal period: no   - Sonorus breathing: no  - Incontinence: no  - Tongue biting: no        Special Features  Age/Date of seizure onset: August 2024  Status epilepticus: no  Self Injury Seizures: no  Precipitating Factors:  none  Longest seizure free interval: 2 weeks     Epilepsy Risk Factors:  None     Current AEDs:  none  Medication side effects: None  Medication adherence: none     Prior AEDs:  None     Prior Evaluation:  - routine EEG: yes - normal  - cEEG/EMU: no  - MRI brain: no  - PET: no  - fMRI: no  - Ictal SPECT: no  - Neuropsych evaluation: no  - Other:      History Reviewed:   The following were reviewed and updated as appropriate: allergies, current medications, past family history, past medical history, past social history, past surgical history, and problem list     Psychiatric History:  None    "  Social History:   Driving: No  Lives Alone: Yes  Occupation: works as a home health aide      Review of Systems   Constitutional:  Negative for appetite change, fatigue and fever.   HENT: Negative.  Negative for hearing loss, tinnitus, trouble swallowing and voice change.    Eyes: Negative.  Negative for photophobia, pain and visual disturbance.   Respiratory: Negative.  Negative for shortness of breath.    Cardiovascular: Negative.  Negative for palpitations.   Gastrointestinal: Negative.  Negative for nausea and vomiting.   Endocrine: Negative.  Negative for cold intolerance.   Genitourinary: Negative.  Negative for dysuria, frequency and urgency.   Musculoskeletal:  Positive for gait problem (balance). Negative for back pain, myalgias, neck pain and neck stiffness.   Skin: Negative.  Negative for rash.   Allergic/Immunologic: Negative.    Neurological:  Positive for seizures. Negative for dizziness, tremors, syncope, facial asymmetry, speech difficulty, weakness, light-headedness, numbness and headaches.   Hematological: Negative.  Does not bruise/bleed easily.   Psychiatric/Behavioral: Negative.  Negative for confusion, hallucinations and sleep disturbance.     I have personally reviewed the MA's review of systems and made changes as necessary     Objective   /100 (BP Location: Left arm, Patient Position: Sitting, Cuff Size: Standard)   Pulse 87   Ht 5' 5\" (1.651 m)   Wt 71.7 kg (158 lb)   BMI 26.29 kg/m²     Physical Exam  Neurological Exam  /100 (BP Location: Left arm, Patient Position: Sitting, Cuff Size: Standard)   Pulse 87   Ht 5' 5\" (1.651 m)   Wt 71.7 kg (158 lb)   BMI 26.29 kg/m²      General Exam  General: well developed, no acute distress.  HEENT: mucous membranes moist, anicteric sclera.   Neck: supple, good ROM.      Neurological Exam  Mental Status: awake, alert, and fully oriented to person, place, time, and situation. Attention and memory intact. Fund of knowledge is " appropriate for age and education.  There is no neglect.    Language: fluency, and comprehension normal.       Cranial Nerves: Pupils equal and reactive to light.  Visual fields full to confrontation. Extraocular motions intact with full versions, normal pursuits and saccades. Facial strength full and symmetric. Facial sensation intact in V1-V3. Hearing intact to voice. Tongue protrudes to midline. Palate elevates symmetrically. Speech clear without notable dysarthria. Shoulder shrug activation full and symmetric.    Motor: Normal bulk and tone. No pronator drift. Strength is 5/5 proximally and distally in all 4 extremities. No involuntary movements.    Sensory: Sensation intact to light touch distally in all extremities.    Coordination: Normal finger-to-nose. Normal rapid alternating movements.     Station and gait: Casual and tandem gait normal. Normal Romberg.    Reflexes: Reflexes 1+ throughout and symmetric.     Radiology Results Review: I have reviewed radiology reports from   including: MRI brain.  MRI BRAIN WITH AND WITHOUT CONTRAST     INDICATION: R56.9: Unspecified convulsions.     COMPARISON:  None.     TECHNIQUE:  Multiplanar, multisequence imaging of the brain was performed before and after gadolinium administration.        IV Contrast:  7 mL of Gadobutrol injection     IMAGE QUALITY:   Diagnostic.     FINDINGS:     BRAIN PARENCHYMA:  There is no discrete mass, mass effect or midline shift. There is no intracranial hemorrhage.  Normal posterior fossa.  Diffusion imaging is unremarkable.     Small scattered hyperintensities on T2/FLAIR imaging are noted in the periventricular and subcortical white matter demonstrating an appearance that is statistically most likely to represent moderate microangiopathic change.     Postcontrast imaging of the brain demonstrates no abnormal enhancement.     VENTRICLES:  Normal for the patient's age.     SELLA AND PITUITARY GLAND:  Normal.     ORBITS: Bilateral cataract  surgery     PARANASAL SINUSES:  Normal.     VASCULATURE:  Evaluation of the major intracranial vasculature demonstrates appropriate flow voids.     CALVARIUM AND SKULL BASE:  Normal.     EXTRACRANIAL SOFT TISSUES:  Normal.     IMPRESSION:     White matter changes suggestive of chronic microangiopathy.  No acute intracranial pathology.  Administrative Statements   I have spent a total time of 30 minutes in caring for this patient on the day of the visit/encounter including Diagnostic results, Risks and benefits of tx options, Instructions for management, Importance of tx compliance, and Documenting in the medical record.

## 2025-05-13 NOTE — TELEPHONE ENCOUNTER
Spoke with patient, patient would like a call back in regards to the BP medication Valsartan 160mg. Patient BP was 150/100. Please advise.

## 2025-05-13 NOTE — TELEPHONE ENCOUNTER
Reason for Disposition  • Message left on unidentified voice mail. Phone number verified.    Protocols used: No Contact or Duplicate Contact Call-Adult-OH

## 2025-05-14 RX ORDER — VALSARTAN 320 MG/1
320 TABLET ORAL DAILY
Qty: 90 TABLET | Refills: 0 | Status: SHIPPED | OUTPATIENT
Start: 2025-05-14

## 2025-05-14 NOTE — TELEPHONE ENCOUNTER
Patient returning call to office. Called office clinical and spoke with Otilia.  Warm transferred to Otilia for further assistance.

## 2025-05-14 NOTE — TELEPHONE ENCOUNTER
Patient notified and will  new script at pharmacy and will continue to monitor her blood pressure and will call us after taking the new dose for one week and let us know how she is doing.

## 2025-06-10 DIAGNOSIS — E78.2 MIXED HYPERLIPIDEMIA: ICD-10-CM

## 2025-06-10 RX ORDER — ATORVASTATIN CALCIUM 20 MG/1
20 TABLET, FILM COATED ORAL DAILY
Qty: 90 TABLET | Refills: 1 | Status: SHIPPED | OUTPATIENT
Start: 2025-06-10

## 2025-06-12 ENCOUNTER — OFFICE VISIT (OUTPATIENT)
Dept: FAMILY MEDICINE CLINIC | Facility: CLINIC | Age: 80
End: 2025-06-12
Payer: COMMERCIAL

## 2025-06-12 VITALS
OXYGEN SATURATION: 95 % | RESPIRATION RATE: 16 BRPM | HEIGHT: 65 IN | TEMPERATURE: 98.4 F | SYSTOLIC BLOOD PRESSURE: 138 MMHG | WEIGHT: 158 LBS | DIASTOLIC BLOOD PRESSURE: 70 MMHG | BODY MASS INDEX: 26.33 KG/M2 | HEART RATE: 76 BPM

## 2025-06-12 DIAGNOSIS — G25.81 RESTLESS LEGS SYNDROME (RLS): ICD-10-CM

## 2025-06-12 DIAGNOSIS — I10 PRIMARY HYPERTENSION: ICD-10-CM

## 2025-06-12 DIAGNOSIS — F32.1 CURRENT MODERATE EPISODE OF MAJOR DEPRESSIVE DISORDER WITHOUT PRIOR EPISODE (HCC): ICD-10-CM

## 2025-06-12 DIAGNOSIS — M81.0 AGE-RELATED OSTEOPOROSIS WITHOUT CURRENT PATHOLOGICAL FRACTURE: ICD-10-CM

## 2025-06-12 DIAGNOSIS — Z12.31 ENCOUNTER FOR SCREENING MAMMOGRAM FOR BREAST CANCER: Primary | ICD-10-CM

## 2025-06-12 DIAGNOSIS — E78.2 MIXED HYPERLIPIDEMIA: ICD-10-CM

## 2025-06-12 PROCEDURE — G2211 COMPLEX E/M VISIT ADD ON: HCPCS | Performed by: FAMILY MEDICINE

## 2025-06-12 PROCEDURE — 99214 OFFICE O/P EST MOD 30 MIN: CPT | Performed by: FAMILY MEDICINE

## 2025-06-12 RX ORDER — VALSARTAN 160 MG/1
160 TABLET ORAL DAILY
Qty: 90 TABLET | Refills: 1 | Status: SHIPPED | OUTPATIENT
Start: 2025-06-12

## 2025-06-12 NOTE — ASSESSMENT & PLAN NOTE
Patient is stable with current anti-hypertensive medicine and continue to follow a low sodium diet and take current medication. All questions about this condition were answered today.   Orders:  •  valsartan (DIOVAN) 160 mg tablet; Take 1 tablet (160 mg total) by mouth daily

## 2025-06-12 NOTE — PROGRESS NOTES
Name: Gale Mueller      : 1945      MRN: [id number]  Encounter Provider: Jameson Frank MD  Encounter Date: 2025   Encounter department: [facility]  :  Assessment & Plan  Encounter for screening mammogram for breast cancer    Orders:  •  Mammo screening bilateral w 3d and cad; Future    Age-related osteoporosis without current pathological fracture  Patient to continue with weight bearing exercises as much as possible and oral intake of 1200 mg of calcium with 800 IU of Vit D to maximize bone protection. Pt  to continue  with DEXA scan every 2 years to reassess. All qustions regarding this problem answered today to patient satisfaction.        Current moderate episode of major depressive disorder without prior episode (HCC)  Patient to continue utilizing medical therapy as well and counseling sources as applicable for condition. If  suicidal thought or fear of suicide to contact 911 and seek help immediately. Meds reviewed and patient questions answered today        Mixed hyperlipidemia  Patient  is stable with current medication and we discussed a low fat low cholesterol diet. Weight loss also discussed for this will help lower cholesterol also. Recheck lipids in 6 months.   Orders:  •  Comprehensive metabolic panel; Future  •  Lipid Panel with Direct LDL reflex; Future    Primary hypertension  Patient is stable with current anti-hypertensive medicine and continue to follow a low sodium diet and take current medication. All questions about this condition were answered today.   Orders:  •  valsartan (DIOVAN) 160 mg tablet; Take 1 tablet (160 mg total) by mouth daily    Restless legs syndrome (RLS)  Patient is stable  and will continue present plan of care and reassess at next routine visit. All questions about this problem from patient were answered today.              Falls Plan of Care: balance, strength, and gait training instructions were provided. Recommended  "assistive device to help with gait and balance.     Urinary Incontinence Plan of Care: counseling topics discussed: practice Kegel (pelvic floor strengthening) exercises, use restroom every 2 hours, limit alcohol, caffeine, spicy foods, and acidic foods, limiting fluid intake 3-4 hours before bed, weight loss, preventing constipation and limiting fluid intake to 60 oz. per day.     History of Present Illness   79-year-old female today for checkup on multimedical problems patient with hypertension anxiety also episode of seizure-like activity that is felt to be due to stress and anxiety as per neurology.  Patient also hyperlipidemia and is due for her lab work at her next visit which is coming up in a few weeks for Medicare wellness.  Patient also decreased her Diovan from 320-160 when she comes back for Medicare wellness in a few weeks and see how she is doing with that dose.      Review of Systems    Objective   /70 (BP Location: Left arm, Patient Position: Sitting, Cuff Size: Standard)   Pulse 76   Temp 98.4 °F (36.9 °C) (Temporal)   Resp 16   Ht 5' 5\" (1.651 m)   Wt 71.7 kg (158 lb)   SpO2 95%   BMI 26.29 kg/m²      Physical Exam    "

## 2025-06-27 ENCOUNTER — RA CDI HCC (OUTPATIENT)
Dept: OTHER | Facility: HOSPITAL | Age: 80
End: 2025-06-27

## 2025-06-30 ENCOUNTER — APPOINTMENT (OUTPATIENT)
Dept: LAB | Facility: CLINIC | Age: 80
End: 2025-06-30
Payer: COMMERCIAL

## 2025-06-30 DIAGNOSIS — E78.2 MIXED HYPERLIPIDEMIA: ICD-10-CM

## 2025-06-30 LAB
ALBUMIN SERPL BCG-MCNC: 4.4 G/DL (ref 3.5–5)
ALP SERPL-CCNC: 24 U/L (ref 34–104)
ALT SERPL W P-5'-P-CCNC: 18 U/L (ref 7–52)
ANION GAP SERPL CALCULATED.3IONS-SCNC: 6 MMOL/L (ref 4–13)
AST SERPL W P-5'-P-CCNC: 16 U/L (ref 13–39)
BILIRUB SERPL-MCNC: 0.87 MG/DL (ref 0.2–1)
BUN SERPL-MCNC: 18 MG/DL (ref 5–25)
CALCIUM SERPL-MCNC: 9.5 MG/DL (ref 8.4–10.2)
CHLORIDE SERPL-SCNC: 102 MMOL/L (ref 96–108)
CHOLEST SERPL-MCNC: 130 MG/DL (ref ?–200)
CO2 SERPL-SCNC: 30 MMOL/L (ref 21–32)
CREAT SERPL-MCNC: 0.87 MG/DL (ref 0.6–1.3)
GFR SERPL CREATININE-BSD FRML MDRD: 64 ML/MIN/1.73SQ M
GLUCOSE P FAST SERPL-MCNC: 96 MG/DL (ref 65–99)
HDLC SERPL-MCNC: 52 MG/DL
LDLC SERPL CALC-MCNC: 50 MG/DL (ref 0–100)
POTASSIUM SERPL-SCNC: 4.5 MMOL/L (ref 3.5–5.3)
PROT SERPL-MCNC: 7 G/DL (ref 6.4–8.4)
SODIUM SERPL-SCNC: 138 MMOL/L (ref 135–147)
TRIGL SERPL-MCNC: 141 MG/DL (ref ?–150)

## 2025-06-30 PROCEDURE — 36415 COLL VENOUS BLD VENIPUNCTURE: CPT

## 2025-06-30 PROCEDURE — 80061 LIPID PANEL: CPT

## 2025-06-30 PROCEDURE — 80053 COMPREHEN METABOLIC PANEL: CPT

## 2025-07-05 NOTE — PROGRESS NOTES
Name: Gale Mueller      : 1945      MRN: [id number]  Encounter Provider: Jameson Frank MD  Encounter Date: 2025   Encounter department: [facility]  :  Assessment & Plan  Medicare annual wellness visit, subsequent         Age-related osteoporosis without current pathological fracture  Patient to continue with weight bearing exercises as much as possible and oral intake of 1200 mg of calcium with 800 IU of Vit D to maximize bone protection. Pt  to continue  with DEXA scan every 2 years to reassess. All qustions regarding this problem answered today to patient satisfaction.   Orders:  •  denosumab (PROLIA) subcutaneous injection 60 mg    Current moderate episode of major depressive disorder without prior episode (HCC)  Patient to continue utilizing medical therapy as well and counseling sources as applicable for condition. If  suicidal thought or fear of suicide to contact 911 and seek help immediately. Meds reviewed and patient questions answered today        Mixed hyperlipidemia  Patient  is stable with current medication and we discussed a low fat low cholesterol diet. Weight loss also discussed for this will help lower cholesterol also. Recheck lipids in 6 months.        Primary hypertension  Patient is stable with current anti-hypertensive medicine and continue to follow a low sodium diet and take current medication. All questions about this condition were answered today.        Restless legs syndrome (RLS)  Patient is stable  and will continue present plan of care and reassess at next routine visit. All questions about this problem from patient were answered today.              Falls Plan of Care: balance, strength, and gait training instructions were provided. Home safety education provided.     Urinary Incontinence Plan of Care: counseling topics discussed: practice Kegel (pelvic floor strengthening) exercises, use restroom every 2 hours, limit alcohol, caffeine,  spicy foods, and acidic foods, limiting fluid intake 3-4 hours before bed, weight loss, preventing constipation and limiting fluid intake to 60 oz. per day.   History of Present Illness   79-year-old female here today for checkup for multimedical Bosi hypertension osteoporosis is her Prolia shot today.  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  Patient still has his chronic lymphedema in her legs that does not look like it is from her heart.  She does wear compression stockings as well as using compression boots to her dissatisfaction.  Will see patient back in about 23 months to reevaluate her after she sees cardiology with her echocardiogram and the plan with her valve.  I do not feel this valve is really compromising her much at this point but will get further evaluation with the follow-up echo.  Patient with no known coronary artery disease.  Patient also with history of some depression and has been stable.    Review of Systems   Constitutional:  Negative for activity change, appetite change, fatigue and fever.   HENT:  Negative for congestion, ear pain, postnasal drip, rhinorrhea, sinus pressure, sinus pain, sneezing and sore throat.    Eyes:  Negative for pain and redness.   Respiratory:  Negative for apnea, cough, chest tightness, shortness of breath and wheezing.    Cardiovascular:  Negative for chest pain, palpitations and leg swelling.   Gastrointestinal:  Negative for abdominal pain, constipation, diarrhea, nausea and vomiting.   Endocrine: Negative for cold intolerance and heat intolerance.   Genitourinary:  Negative for difficulty urinating, dysuria, frequency, hematuria and urgency.   Musculoskeletal:  Negative for arthralgias, back pain, gait problem and myalgias.   Skin:  Negative for rash.   Neurological:  Negative for  "dizziness, speech difficulty, weakness, numbness and headaches.   Hematological:  Does not bruise/bleed easily.   Psychiatric/Behavioral:  Negative for agitation, confusion and hallucinations.        Objective   /70 (BP Location: Left arm, Patient Position: Sitting, Cuff Size: Large)   Pulse 75   Temp 97.6 °F (36.4 °C) (Temporal)   Ht 5' 5\" (1.651 m)   Wt 72.6 kg (160 lb)   SpO2 94%   BMI 26.63 kg/m²      Physical Exam  Constitutional:       Appearance: Normal appearance. She is not ill-appearing.   HENT:      Head: Normocephalic and atraumatic.      Right Ear: Tympanic membrane normal.      Left Ear: Tympanic membrane normal.      Nose: Nose normal.      Mouth/Throat:      Mouth: Mucous membranes are moist.     Eyes:      Extraocular Movements: Extraocular movements intact.      Conjunctiva/sclera: Conjunctivae normal.      Pupils: Pupils are equal, round, and reactive to light.       Cardiovascular:      Rate and Rhythm: Normal rate and regular rhythm.   Pulmonary:      Effort: Pulmonary effort is normal. No respiratory distress.      Breath sounds: Normal breath sounds. No wheezing.   Abdominal:      General: Bowel sounds are normal.      Palpations: Abdomen is soft.      Tenderness: There is no abdominal tenderness.     Musculoskeletal:         General: No tenderness. Normal range of motion.      Cervical back: Normal range of motion and neck supple.      Right lower leg: No edema.      Left lower leg: No edema.     Skin:     General: Skin is warm and dry.     Neurological:      Mental Status: She is alert and oriented to person, place, and time.     Psychiatric:         Mood and Affect: Mood normal.         Behavior: Behavior normal.         Thought Content: Thought content normal.         Judgment: Judgment normal.       Answers submitted by the patient for this visit:  Medicare Annual Wellness Visit (Submitted on 7/6/2025)  How would you rate your overall health?: fair  Compared to last year, how " "is your physical health?: slightly worse  In general, how satisfied are you with your life?: satisfied  Compared to last year, how is your eyesight?: slightly worse  Compared to last year, how is your hearing?: same  Compared to last year, how is your emotional/mental health?: same  How often is anger a problem for you?: never, rarely  How often do you feel unusually tired/fatigued?: sometimes  In the past 7 days, how much pain have you experienced?: some  If you answered \"some\" or \"a lot\", please rate the severity of your pain on a scale of 1 to 10 (1 being the least severe pain and 10 being the most intense pain).: 5/10  In the past 6 months, have you lost or gained 10 pounds without trying?: Yes  One or more falls in the last year: Yes  In the past 6 months, have you accidentally leaked urine?: Yes  Do you have trouble with the stairs inside or outside your home?: No  Does your home have working smoke alarms?: Yes  Does your home have a carbon monoxide monitor?: Yes  Which safety hazards (if any) have you experienced in your home? Please select all that apply.: none  How would you describe your current diet? Please select all that apply.: Regular  In addition to prescription medications, are you taking any over-the-counter supplements?: Yes  If yes, what supplements are you taking?: Garlic fish oil  Can you manage your medications?: Yes  Are you currently taking any opioid medications?: No  Can you walk and transfer into and out of your bed and chair?: Yes  Can you dress and groom yourself?: Yes  Can you bathe or shower yourself?: Yes  Can you feed yourself?: Yes  Can you do your laundry/ housekeeping?: Yes  Can you manage your money, pay your bills, and track your expenses?: Yes  Can you make your own meals?: Yes  Can you do your own shopping?: Yes  Within the last 12 months, have you had any hospitalizations or Emergency Department visits?: Yes  If yes, how many times have you been hospitalized within the past " year?: 1-2  Do you have a living will?: Yes  Do you have a Durable POA (Power of ) for healthcare decisions?: Yes  Do you have an Advanced Directive for end of life decisions?: Yes  How often have you used an illegal drug (including marijuana) or a prescription medication for non-medical reasons in the past year?: never  What is the typical number of drinks you consume in a day?: 0  What is the typical number of drinks you consume in a week?: 1  How often did you have a drink containing alcohol in the past year?: monthly or less  How many drinks did you have on a typical day  when you were drinking in the past year?: 0  How often did you have 6 or more drinks on one occasion in the past year?: never

## 2025-07-07 ENCOUNTER — OFFICE VISIT (OUTPATIENT)
Dept: FAMILY MEDICINE CLINIC | Facility: CLINIC | Age: 80
End: 2025-07-07
Payer: COMMERCIAL

## 2025-07-07 VITALS
DIASTOLIC BLOOD PRESSURE: 70 MMHG | BODY MASS INDEX: 26.66 KG/M2 | WEIGHT: 160 LBS | OXYGEN SATURATION: 94 % | HEART RATE: 75 BPM | TEMPERATURE: 97.6 F | HEIGHT: 65 IN | SYSTOLIC BLOOD PRESSURE: 136 MMHG

## 2025-07-07 DIAGNOSIS — I10 PRIMARY HYPERTENSION: ICD-10-CM

## 2025-07-07 DIAGNOSIS — M81.0 AGE-RELATED OSTEOPOROSIS WITHOUT CURRENT PATHOLOGICAL FRACTURE: ICD-10-CM

## 2025-07-07 DIAGNOSIS — G25.81 RESTLESS LEGS SYNDROME (RLS): ICD-10-CM

## 2025-07-07 DIAGNOSIS — Z00.00 MEDICARE ANNUAL WELLNESS VISIT, SUBSEQUENT: Primary | ICD-10-CM

## 2025-07-07 DIAGNOSIS — F32.1 CURRENT MODERATE EPISODE OF MAJOR DEPRESSIVE DISORDER WITHOUT PRIOR EPISODE (HCC): ICD-10-CM

## 2025-07-07 DIAGNOSIS — E78.2 MIXED HYPERLIPIDEMIA: ICD-10-CM

## 2025-07-07 PROCEDURE — 99214 OFFICE O/P EST MOD 30 MIN: CPT | Performed by: FAMILY MEDICINE

## 2025-07-07 PROCEDURE — G0439 PPPS, SUBSEQ VISIT: HCPCS | Performed by: FAMILY MEDICINE

## 2025-07-07 PROCEDURE — 96372 THER/PROPH/DIAG INJ SC/IM: CPT | Performed by: FAMILY MEDICINE

## 2025-07-07 PROCEDURE — G2211 COMPLEX E/M VISIT ADD ON: HCPCS | Performed by: FAMILY MEDICINE

## 2025-07-07 RX ORDER — FUROSEMIDE 20 MG/1
1 TABLET ORAL DAILY
COMMUNITY
Start: 2025-06-19

## 2025-07-07 RX ORDER — SPIRONOLACTONE 25 MG/1
1 TABLET ORAL DAILY
COMMUNITY
Start: 2025-06-19

## 2025-07-07 NOTE — PROGRESS NOTES
Name: Gale Mueller      : 1945      MRN: [id number]  Encounter Provider: Jameson Frank MD  Encounter Date: 2025   Encounter department: [facility]  :  Assessment & Plan       Preventive health issues were discussed with patient, and age appropriate screening tests were ordered as noted in patient's After Visit Summary. Personalized health advice and appropriate referrals for health education or preventive services given if needed, as noted in patient's After Visit Summary.    History of Present Illness     HPI   Patient Care Team:  Jameson Frank MD as PCP - General (Family Medicine)  Jameson Frank MD as PCP - [facility]CP-[facility] ([facility])    Review of Systems  Medical History Reviewed by provider this encounter:       Annual Wellness Visit Questionnaire   Gale is here for her Subsequent Wellness visit. Last Medicare Wellness visit information reviewed, patient interviewed and updates made to the record today.      Health Risk Assessment:   Patient rates overall health as fair. Patient feels that their physical health rating is slightly worse. Patient is satisfied with their life. Eyesight was rated as slightly worse. Hearing was rated as same. Patient feels that their emotional and mental health rating is same. Patients states they are never, rarely angry. Patient states they are sometimes unusually tired/fatigued. Pain experienced in the last 7 days has been some. Patient's pain rating has been 5/10. Patient states that she has experienced weight loss or gain in last 6 months.     Depression Screening:   PHQ-9 Score: 9      Fall Risk Screening:   In the past year, patient has experienced: history of falling in past year      Urinary Incontinence Screening:   Patient has leaked urine accidently in the last six months.     Home Safety:  Patient does not have trouble with stairs inside or outside of their home. Patient has working smoke alarms and has working carbon  monoxide detector. Home safety hazards include: none.     Nutrition:   Current diet is Regular.     Medications:   Patient is currently taking over-the-counter supplements. OTC medications include: see medication list. Patient is able to manage medications.     Activities of Daily Living (ADLs)/Instrumental Activities of Daily Living (IADLs):   Walk and transfer into and out of bed and chair?: Yes  Dress and groom yourself?: Yes    Bathe or shower yourself?: Yes    Feed yourself? Yes  Do your laundry/housekeeping?: Yes  Manage your money, pay your bills and track your expenses?: Yes  Make your own meals?: Yes    Do your own shopping?: Yes    Previous Hospitalizations:   Any hospitalizations or ED visits within the last 12 months?: Yes    How many hospitalizations have you had in the last year?: 1-2    Advance Care Planning:   Living will: Yes    Durable POA for healthcare: Yes    Advanced directive: Yes      Preventive Screenings      Cardiovascular Screening:    General: Screening Not Indicated and History Lipid Disorder      Diabetes Screening:     General: Screening Current      Colorectal Cancer Screening:     General: Screening Current      Breast Cancer Screening:     General: Screening Current      Cervical Cancer Screening:    General: Screening Not Indicated      Osteoporosis Screening:    General: Screening Not Indicated and History Osteoporosis      Lung Cancer Screening:     General: Screening Not Indicated      Hepatitis C Screening:    General: Screening Current    Immunizations:  - Immunizations due: Prevnar 20 and Zoster (Shingrix)    Screening, Brief Intervention, and Referral to Treatment (SBIRT)     Screening  Typical number of drinks in a day: 0  Typical number of drinks in a week: 1  Interpretation: Low risk drinking behavior.    AUDIT-C Screenin) How often did you have a drink containing alcohol in the past year? monthly or less  2) How many drinks did you have on a typical day when you  "were drinking in the past year? 0  3) How often did you have 6 or more drinks on one occasion in the past year? never    AUDIT-C Score: 1  Interpretation: Score 0-2 (female): Negative screen for alcohol misuse    Single Item Drug Screening:  How often have you used an illegal drug (including marijuana) or a prescription medication for non-medical reasons in the past year? never    Single Item Drug Screen Score: 0  Interpretation: Negative screen for possible drug use disorder    SDOH Risk Assessment  Social determinants of health (SDOH) risk assesment tool was completed. The tool at a minimum covered housing stability, food insecurity, transportation needs, and utility difficulty. Patient had at risk responses for the following SDOH domains: housing stability.     Social Drivers of Health     Financial Resource Strain: Low Risk  (5/10/2023)    Overall Financial Resource Strain (CARDIA)     Difficulty of Paying Living Expenses: Not hard at all   Food Insecurity: No Food Insecurity (7/6/2025)    Nursing - Inadequate Food Risk Classification     Worried About Running Out of Food in the Last Year: Never true     Ran Out of Food in the Last Year: Never true   Transportation Needs: No Transportation Needs (7/6/2025)    PRAPARE - Transportation     Lack of Transportation (Medical): No     Lack of Transportation (Non-Medical): No   Housing Stability: High Risk (7/6/2025)    Housing Stability Vital Sign     Unable to Pay for Housing in the Last Year: No     Number of Times Moved in the Last Year: 1     Homeless in the Last Year: Yes   Utilities: Not At Risk (7/6/2025)    [facility] Utilities     Threatened with loss of utilities: No     No results found.    Objective   Ht 5' 5\" (1.651 m)   Wt 72.6 kg (160 lb)   BMI 26.63 kg/m²     Physical Exam    "

## 2025-07-14 ENCOUNTER — TRANSCRIBE ORDERS (OUTPATIENT)
Dept: LAB | Facility: CLINIC | Age: 80
End: 2025-07-14

## 2025-07-14 ENCOUNTER — APPOINTMENT (OUTPATIENT)
Dept: LAB | Facility: CLINIC | Age: 80
End: 2025-07-14
Payer: COMMERCIAL

## 2025-07-14 DIAGNOSIS — E78.1 PURE HYPERGLYCERIDEMIA: ICD-10-CM

## 2025-07-14 DIAGNOSIS — I25.10 CVD (CARDIOVASCULAR DISEASE): ICD-10-CM

## 2025-07-14 DIAGNOSIS — I10 ESSENTIAL HYPERTENSION: ICD-10-CM

## 2025-07-14 DIAGNOSIS — Z79.899 ENCOUNTER FOR LONG-TERM (CURRENT) USE OF MEDICATIONS: ICD-10-CM

## 2025-07-14 DIAGNOSIS — E78.00 PURE HYPERCHOLESTEROLEMIA: ICD-10-CM

## 2025-07-14 DIAGNOSIS — E78.00 PURE HYPERCHOLESTEROLEMIA: Primary | ICD-10-CM

## 2025-07-14 LAB
ANION GAP SERPL CALCULATED.3IONS-SCNC: 8 MMOL/L (ref 4–13)
BNP SERPL-MCNC: 8 PG/ML (ref 0–100)
BUN SERPL-MCNC: 19 MG/DL (ref 5–25)
CALCIUM SERPL-MCNC: 9.2 MG/DL (ref 8.4–10.2)
CHLORIDE SERPL-SCNC: 102 MMOL/L (ref 96–108)
CO2 SERPL-SCNC: 30 MMOL/L (ref 21–32)
CREAT SERPL-MCNC: 0.87 MG/DL (ref 0.6–1.3)
GFR SERPL CREATININE-BSD FRML MDRD: 64 ML/MIN/1.73SQ M
GLUCOSE P FAST SERPL-MCNC: 100 MG/DL (ref 65–99)
MAGNESIUM SERPL-MCNC: 2 MG/DL (ref 1.9–2.7)
POTASSIUM SERPL-SCNC: 4.3 MMOL/L (ref 3.5–5.3)
SODIUM SERPL-SCNC: 140 MMOL/L (ref 135–147)

## 2025-07-14 PROCEDURE — 36415 COLL VENOUS BLD VENIPUNCTURE: CPT

## 2025-07-14 PROCEDURE — 83880 ASSAY OF NATRIURETIC PEPTIDE: CPT

## 2025-07-14 PROCEDURE — 83735 ASSAY OF MAGNESIUM: CPT

## 2025-07-14 PROCEDURE — 80048 BASIC METABOLIC PNL TOTAL CA: CPT

## 2025-08-06 ENCOUNTER — HOSPITAL ENCOUNTER (OUTPATIENT)
Dept: MAMMOGRAPHY | Facility: HOSPITAL | Age: 80
Discharge: HOME | End: 2025-08-06
Attending: FAMILY MEDICINE
Payer: COMMERCIAL

## 2025-08-06 VITALS — HEIGHT: 65 IN | BODY MASS INDEX: 26.66 KG/M2 | WEIGHT: 160 LBS

## 2025-08-06 DIAGNOSIS — Z12.31 ENCOUNTER FOR SCREENING MAMMOGRAM FOR BREAST CANCER: ICD-10-CM

## 2025-08-06 PROCEDURE — 77067 SCR MAMMO BI INCL CAD: CPT

## 2025-08-06 PROCEDURE — 77063 BREAST TOMOSYNTHESIS BI: CPT
